# Patient Record
Sex: MALE | Race: WHITE | NOT HISPANIC OR LATINO | Employment: OTHER | URBAN - METROPOLITAN AREA
[De-identification: names, ages, dates, MRNs, and addresses within clinical notes are randomized per-mention and may not be internally consistent; named-entity substitution may affect disease eponyms.]

---

## 2017-07-18 ENCOUNTER — ANESTHESIA EVENT (OUTPATIENT)
Dept: PERIOP | Facility: HOSPITAL | Age: 67
End: 2017-07-18

## 2017-07-18 ENCOUNTER — ANESTHESIA (OUTPATIENT)
Dept: PERIOP | Facility: HOSPITAL | Age: 67
End: 2017-07-18

## 2017-07-18 ENCOUNTER — APPOINTMENT (OUTPATIENT)
Dept: GENERAL RADIOLOGY | Facility: HOSPITAL | Age: 67
End: 2017-07-18

## 2017-07-18 ENCOUNTER — HOSPITAL ENCOUNTER (INPATIENT)
Facility: HOSPITAL | Age: 67
LOS: 11 days | Discharge: HOME HEALTH | End: 2017-07-29
Attending: FAMILY MEDICINE | Admitting: SURGERY

## 2017-07-18 ENCOUNTER — APPOINTMENT (OUTPATIENT)
Dept: CT IMAGING | Facility: HOSPITAL | Age: 67
End: 2017-07-18

## 2017-07-18 DIAGNOSIS — R53.1 GENERALIZED WEAKNESS: ICD-10-CM

## 2017-07-18 DIAGNOSIS — I71.30 RUPTURED ABDOMINAL AORTIC ANEURYSM (AAA) (HCC): Primary | ICD-10-CM

## 2017-07-18 LAB
ABO GROUP BLD: NORMAL
ALBUMIN SERPL-MCNC: 3.2 G/DL (ref 3.5–5.2)
ALBUMIN/GLOB SERPL: 1.1 G/DL
ALP SERPL-CCNC: 81 U/L (ref 39–117)
ALT SERPL W P-5'-P-CCNC: 19 U/L (ref 1–41)
AMPHET+METHAMPHET UR QL: NEGATIVE
ANION GAP SERPL CALCULATED.3IONS-SCNC: 13.6 MMOL/L
ANION GAP SERPL CALCULATED.3IONS-SCNC: 15.4 MMOL/L
ANION GAP SERPL CALCULATED.3IONS-SCNC: 23.6 MMOL/L
APAP SERPL-MCNC: <5 MCG/ML (ref 10–30)
APTT PPP: 30.8 SECONDS (ref 22.7–35.4)
APTT PPP: 35.2 SECONDS (ref 22.7–35.4)
ARTERIAL PATENCY WRIST A: ABNORMAL
ARTERIAL PATENCY WRIST A: POSITIVE
AST SERPL-CCNC: 32 U/L (ref 1–40)
ATMOSPHERIC PRESS: 749.5 MMHG
ATMOSPHERIC PRESS: 754 MMHG
BACTERIA UR QL AUTO: ABNORMAL /HPF
BARBITURATES UR QL SCN: NEGATIVE
BASE EXCESS BLDA CALC-SCNC: -11.4 MMOL/L (ref 0–2)
BASE EXCESS BLDA CALC-SCNC: -4.2 MMOL/L (ref 0–2)
BASOPHILS # BLD AUTO: 0.01 10*3/MM3 (ref 0–0.2)
BASOPHILS # BLD AUTO: 0.01 10*3/MM3 (ref 0–0.2)
BASOPHILS # BLD AUTO: 0.03 10*3/MM3 (ref 0–0.2)
BASOPHILS NFR BLD AUTO: 0 % (ref 0–1.5)
BASOPHILS NFR BLD AUTO: 0 % (ref 0–1.5)
BASOPHILS NFR BLD AUTO: 0.2 % (ref 0–1.5)
BDY SITE: ABNORMAL
BDY SITE: ABNORMAL
BENZODIAZ UR QL SCN: NEGATIVE
BILIRUB SERPL-MCNC: 0.3 MG/DL (ref 0.1–1.2)
BILIRUB UR QL STRIP: NEGATIVE
BLD GP AB SCN SERPL QL: NEGATIVE
BUN BLD-MCNC: 10 MG/DL (ref 8–23)
BUN BLD-MCNC: 12 MG/DL (ref 8–23)
BUN BLD-MCNC: 9 MG/DL (ref 8–23)
BUN/CREAT SERPL: 5.3 (ref 7–25)
BUN/CREAT SERPL: 7.7 (ref 7–25)
BUN/CREAT SERPL: 9.2 (ref 7–25)
CA-I BLD-MCNC: 3.7 MG/DL (ref 4.6–5.4)
CA-I SERPL ISE-MCNC: 0.93 MMOL/L (ref 1.1–1.35)
CALCIUM SPEC-SCNC: 6.5 MG/DL (ref 8.6–10.5)
CALCIUM SPEC-SCNC: 7.1 MG/DL (ref 8.6–10.5)
CALCIUM SPEC-SCNC: 8.6 MG/DL (ref 8.6–10.5)
CANNABINOIDS SERPL QL: NEGATIVE
CHLORIDE SERPL-SCNC: 100 MMOL/L (ref 98–107)
CHLORIDE SERPL-SCNC: 106 MMOL/L (ref 98–107)
CHLORIDE SERPL-SCNC: 109 MMOL/L (ref 98–107)
CLARITY UR: ABNORMAL
CO2 SERPL-SCNC: 17.4 MMOL/L (ref 22–29)
CO2 SERPL-SCNC: 20.6 MMOL/L (ref 22–29)
CO2 SERPL-SCNC: 21.4 MMOL/L (ref 22–29)
COCAINE UR QL: NEGATIVE
COLOR UR: YELLOW
CREAT BLD-MCNC: 1.3 MG/DL (ref 0.76–1.27)
CREAT BLD-MCNC: 1.3 MG/DL (ref 0.76–1.27)
CREAT BLD-MCNC: 1.7 MG/DL (ref 0.76–1.27)
D DIMER PPP FEU-MCNC: >20 MCGFEU/ML (ref 0–0.49)
DEPRECATED RDW RBC AUTO: 46.6 FL (ref 37–54)
DEPRECATED RDW RBC AUTO: 46.7 FL (ref 37–54)
DEPRECATED RDW RBC AUTO: 47.8 FL (ref 37–54)
EOSINOPHIL # BLD AUTO: 0.03 10*3/MM3 (ref 0–0.7)
EOSINOPHIL # BLD AUTO: 0.04 10*3/MM3 (ref 0–0.7)
EOSINOPHIL # BLD AUTO: 0.16 10*3/MM3 (ref 0–0.7)
EOSINOPHIL NFR BLD AUTO: 0.1 % (ref 0.3–6.2)
EOSINOPHIL NFR BLD AUTO: 0.2 % (ref 0.3–6.2)
EOSINOPHIL NFR BLD AUTO: 1.2 % (ref 0.3–6.2)
ERYTHROCYTE [DISTWIDTH] IN BLOOD BY AUTOMATED COUNT: 13.4 % (ref 11.5–14.5)
ERYTHROCYTE [DISTWIDTH] IN BLOOD BY AUTOMATED COUNT: 13.9 % (ref 11.5–14.5)
ERYTHROCYTE [DISTWIDTH] IN BLOOD BY AUTOMATED COUNT: 13.9 % (ref 11.5–14.5)
ETHANOL BLD-MCNC: <10 MG/DL (ref 0–10)
ETHANOL UR QL: <0.01 %
FIBRINOGEN PPP-MCNC: 273 MG/DL (ref 219–464)
FIBRINOGEN PPP-MCNC: 338 MG/DL (ref 219–464)
GFR SERPL CREATININE-BSD FRML MDRD: 40 ML/MIN/1.73
GFR SERPL CREATININE-BSD FRML MDRD: 55 ML/MIN/1.73
GFR SERPL CREATININE-BSD FRML MDRD: 55 ML/MIN/1.73
GLOBULIN UR ELPH-MCNC: 2.8 GM/DL
GLUCOSE BLD-MCNC: 134 MG/DL (ref 65–99)
GLUCOSE BLD-MCNC: 150 MG/DL (ref 65–99)
GLUCOSE BLD-MCNC: 358 MG/DL (ref 65–99)
GLUCOSE BLDC GLUCOMTR-MCNC: 138 MG/DL (ref 70–130)
GLUCOSE UR STRIP-MCNC: ABNORMAL MG/DL
HCO3 BLDA-SCNC: 16.2 MMOL/L (ref 22–28)
HCO3 BLDA-SCNC: 21.9 MMOL/L (ref 22–28)
HCT VFR BLD AUTO: 40.2 % (ref 40.4–52.2)
HCT VFR BLD AUTO: 43.3 % (ref 40.4–52.2)
HCT VFR BLD AUTO: 43.5 % (ref 40.4–52.2)
HGB BLD-MCNC: 12.7 G/DL (ref 13.7–17.6)
HGB BLD-MCNC: 14.5 G/DL (ref 13.7–17.6)
HGB BLD-MCNC: 14.5 G/DL (ref 13.7–17.6)
HGB UR QL STRIP.AUTO: ABNORMAL
HOROWITZ INDEX BLD+IHG-RTO: 100 %
HOROWITZ INDEX BLD+IHG-RTO: 100 %
HYALINE CASTS UR QL AUTO: ABNORMAL /LPF
IMM GRANULOCYTES # BLD: 0.11 10*3/MM3 (ref 0–0.03)
IMM GRANULOCYTES # BLD: 0.14 10*3/MM3 (ref 0–0.03)
IMM GRANULOCYTES # BLD: 0.51 10*3/MM3 (ref 0–0.03)
IMM GRANULOCYTES NFR BLD: 0.5 % (ref 0–0.5)
IMM GRANULOCYTES NFR BLD: 0.7 % (ref 0–0.5)
IMM GRANULOCYTES NFR BLD: 3.8 % (ref 0–0.5)
INR PPP: 1.29 (ref 0.9–1.1)
INR PPP: 1.38 (ref 0.9–1.1)
INR PPP: 1.4 (ref 0.9–1.1)
KETONES UR QL STRIP: NEGATIVE
LEUKOCYTE ESTERASE UR QL STRIP.AUTO: ABNORMAL
LYMPHOCYTES # BLD AUTO: 1.66 10*3/MM3 (ref 0.9–4.8)
LYMPHOCYTES # BLD AUTO: 1.72 10*3/MM3 (ref 0.9–4.8)
LYMPHOCYTES # BLD AUTO: 1.83 10*3/MM3 (ref 0.9–4.8)
LYMPHOCYTES NFR BLD AUTO: 13.5 % (ref 19.6–45.3)
LYMPHOCYTES NFR BLD AUTO: 7.7 % (ref 19.6–45.3)
LYMPHOCYTES NFR BLD AUTO: 8 % (ref 19.6–45.3)
MCH RBC QN AUTO: 30.6 PG (ref 27–32.7)
MCH RBC QN AUTO: 30.7 PG (ref 27–32.7)
MCH RBC QN AUTO: 30.9 PG (ref 27–32.7)
MCHC RBC AUTO-ENTMCNC: 31.6 G/DL (ref 32.6–36.4)
MCHC RBC AUTO-ENTMCNC: 33.3 G/DL (ref 32.6–36.4)
MCHC RBC AUTO-ENTMCNC: 33.5 G/DL (ref 32.6–36.4)
MCV RBC AUTO: 92.1 FL (ref 79.8–96.2)
MCV RBC AUTO: 92.2 FL (ref 79.8–96.2)
MCV RBC AUTO: 96.9 FL (ref 79.8–96.2)
METHADONE UR QL SCN: NEGATIVE
MODALITY: ABNORMAL
MODALITY: ABNORMAL
MONOCYTES # BLD AUTO: 0.28 10*3/MM3 (ref 0.2–1.2)
MONOCYTES # BLD AUTO: 0.3 10*3/MM3 (ref 0.2–1.2)
MONOCYTES # BLD AUTO: 0.64 10*3/MM3 (ref 0.2–1.2)
MONOCYTES NFR BLD AUTO: 1.3 % (ref 5–12)
MONOCYTES NFR BLD AUTO: 1.4 % (ref 5–12)
MONOCYTES NFR BLD AUTO: 4.7 % (ref 5–12)
NEUTROPHILS # BLD AUTO: 10.41 10*3/MM3 (ref 1.9–8.1)
NEUTROPHILS # BLD AUTO: 19.39 10*3/MM3 (ref 1.9–8.1)
NEUTROPHILS # BLD AUTO: 19.45 10*3/MM3 (ref 1.9–8.1)
NEUTROPHILS NFR BLD AUTO: 76.6 % (ref 42.7–76)
NEUTROPHILS NFR BLD AUTO: 90 % (ref 42.7–76)
NEUTROPHILS NFR BLD AUTO: 90.1 % (ref 42.7–76)
NITRITE UR QL STRIP: NEGATIVE
NT-PROBNP SERPL-MCNC: 6255 PG/ML (ref 0–900)
O2 A-A PPRESDIFF RESPIRATORY: 0.7 MMHG
O2 A-A PPRESDIFF RESPIRATORY: 0.8 MMHG
OPIATES UR QL: NEGATIVE
OXYCODONE UR QL SCN: NEGATIVE
PCO2 BLDA: 41.6 MM HG (ref 35–45)
PCO2 BLDA: 42.6 MM HG (ref 35–45)
PEEP RESPIRATORY: 5 CM[H2O]
PEEP RESPIRATORY: 7.5 CM[H2O]
PH BLDA: 7.2 PH UNITS (ref 7.35–7.45)
PH BLDA: 7.32 PH UNITS (ref 7.35–7.45)
PH UR STRIP.AUTO: 7.5 [PH] (ref 5–8)
PLATELET # BLD AUTO: 190 10*3/MM3 (ref 140–500)
PLATELET # BLD AUTO: 76 10*3/MM3 (ref 140–500)
PLATELET # BLD AUTO: 82 10*3/MM3 (ref 140–500)
PMV BLD AUTO: 10.3 FL (ref 6–12)
PMV BLD AUTO: 10.5 FL (ref 6–12)
PMV BLD AUTO: 11.2 FL (ref 6–12)
PO2 BLDA: 506.6 MM HG (ref 80–100)
PO2 BLDA: 523 MM HG (ref 80–100)
POTASSIUM BLD-SCNC: 4 MMOL/L (ref 3.5–5.2)
POTASSIUM BLD-SCNC: 4.1 MMOL/L (ref 3.5–5.2)
POTASSIUM BLD-SCNC: 4.9 MMOL/L (ref 3.5–5.2)
PROT SERPL-MCNC: 6 G/DL (ref 6–8.5)
PROT UR QL STRIP: ABNORMAL
PROTHROMBIN TIME: 15.6 SECONDS (ref 11.7–14.2)
PROTHROMBIN TIME: 16.4 SECONDS (ref 11.7–14.2)
PROTHROMBIN TIME: 16.7 SECONDS (ref 11.7–14.2)
RBC # BLD AUTO: 4.15 10*6/MM3 (ref 4.6–6)
RBC # BLD AUTO: 4.7 10*6/MM3 (ref 4.6–6)
RBC # BLD AUTO: 4.72 10*6/MM3 (ref 4.6–6)
RBC # UR: ABNORMAL /HPF
REF LAB TEST METHOD: ABNORMAL
RH BLD: POSITIVE
SALICYLATES SERPL-MCNC: <0.3 MG/DL
SAO2 % BLDCOA: 100 % (ref 92–99)
SAO2 % BLDCOA: 100 % (ref 92–99)
SET MECH RESP RATE: 20
SET MECH RESP RATE: 20
SODIUM BLD-SCNC: 141 MMOL/L (ref 136–145)
SODIUM BLD-SCNC: 142 MMOL/L (ref 136–145)
SODIUM BLD-SCNC: 144 MMOL/L (ref 136–145)
SP GR UR STRIP: 1.02 (ref 1–1.03)
SQUAMOUS #/AREA URNS HPF: ABNORMAL /HPF
THROMBIN TIME: 16.6 SECONDS (ref 15.7–20.4)
TOTAL RATE: 20 BREATHS/MINUTE
TOTAL RATE: 33 BREATHS/MINUTE
TROPONIN T SERPL-MCNC: 0.08 NG/ML (ref 0–0.03)
UROBILINOGEN UR QL STRIP: ABNORMAL
VENTILATOR MODE: ABNORMAL
VENTILATOR MODE: ABNORMAL
VT ON VENT VENT: 550 ML
VT ON VENT VENT: 550 ML
WBC NRBC COR # BLD: 13.58 10*3/MM3 (ref 4.5–10.7)
WBC NRBC COR # BLD: 21.52 10*3/MM3 (ref 4.5–10.7)
WBC NRBC COR # BLD: 21.62 10*3/MM3 (ref 4.5–10.7)
WBC UR QL AUTO: ABNORMAL /HPF

## 2017-07-18 PROCEDURE — 86901 BLOOD TYPING SEROLOGIC RH(D): CPT | Performed by: FAMILY MEDICINE

## 2017-07-18 PROCEDURE — 93005 ELECTROCARDIOGRAM TRACING: CPT | Performed by: SURGERY

## 2017-07-18 PROCEDURE — C1894 INTRO/SHEATH, NON-LASER: HCPCS | Performed by: SURGERY

## 2017-07-18 PROCEDURE — 94799 UNLISTED PULMONARY SVC/PX: CPT

## 2017-07-18 PROCEDURE — 85014 HEMATOCRIT: CPT

## 2017-07-18 PROCEDURE — 25010000003 CEFAZOLIN PER 500 MG: Performed by: NURSE ANESTHETIST, CERTIFIED REGISTERED

## 2017-07-18 PROCEDURE — 84484 ASSAY OF TROPONIN QUANT: CPT | Performed by: FAMILY MEDICINE

## 2017-07-18 PROCEDURE — 87086 URINE CULTURE/COLONY COUNT: CPT | Performed by: FAMILY MEDICINE

## 2017-07-18 PROCEDURE — 85025 COMPLETE CBC W/AUTO DIFF WBC: CPT | Performed by: FAMILY MEDICINE

## 2017-07-18 PROCEDURE — 047C3ZZ DILATION OF RIGHT COMMON ILIAC ARTERY, PERCUTANEOUS APPROACH: ICD-10-PCS | Performed by: SURGERY

## 2017-07-18 PROCEDURE — 80307 DRUG TEST PRSMV CHEM ANLYZR: CPT | Performed by: FAMILY MEDICINE

## 2017-07-18 PROCEDURE — 85025 COMPLETE CBC W/AUTO DIFF WBC: CPT | Performed by: SURGERY

## 2017-07-18 PROCEDURE — 99291 CRITICAL CARE FIRST HOUR: CPT

## 2017-07-18 PROCEDURE — P9016 RBC LEUKOCYTES REDUCED: HCPCS

## 2017-07-18 PROCEDURE — 85384 FIBRINOGEN ACTIVITY: CPT | Performed by: SURGERY

## 2017-07-18 PROCEDURE — C1769 GUIDE WIRE: HCPCS | Performed by: SURGERY

## 2017-07-18 PROCEDURE — 85610 PROTHROMBIN TIME: CPT | Performed by: SURGERY

## 2017-07-18 PROCEDURE — 85384 FIBRINOGEN ACTIVITY: CPT | Performed by: FAMILY MEDICINE

## 2017-07-18 PROCEDURE — 85670 THROMBIN TIME PLASMA: CPT | Performed by: FAMILY MEDICINE

## 2017-07-18 PROCEDURE — 85730 THROMBOPLASTIN TIME PARTIAL: CPT | Performed by: SURGERY

## 2017-07-18 PROCEDURE — 80053 COMPREHEN METABOLIC PANEL: CPT | Performed by: FAMILY MEDICINE

## 2017-07-18 PROCEDURE — 25010000002 MIDAZOLAM PER 1 MG: Performed by: NURSE ANESTHETIST, CERTIFIED REGISTERED

## 2017-07-18 PROCEDURE — 25010000003 CEFAZOLIN PER 500 MG: Performed by: SURGERY

## 2017-07-18 PROCEDURE — 93010 ELECTROCARDIOGRAM REPORT: CPT | Performed by: INTERNAL MEDICINE

## 2017-07-18 PROCEDURE — 25010000002 PROTAMINE SULFATE PER 10 MG: Performed by: NURSE ANESTHETIST, CERTIFIED REGISTERED

## 2017-07-18 PROCEDURE — 86900 BLOOD TYPING SEROLOGIC ABO: CPT

## 2017-07-18 PROCEDURE — C1751 CATH, INF, PER/CENT/MIDLINE: HCPCS | Performed by: ANESTHESIOLOGY

## 2017-07-18 PROCEDURE — C1725 CATH, TRANSLUMIN NON-LASER: HCPCS | Performed by: SURGERY

## 2017-07-18 PROCEDURE — 82962 GLUCOSE BLOOD TEST: CPT

## 2017-07-18 PROCEDURE — 94002 VENT MGMT INPAT INIT DAY: CPT

## 2017-07-18 PROCEDURE — 86901 BLOOD TYPING SEROLOGIC RH(D): CPT

## 2017-07-18 PROCEDURE — 81001 URINALYSIS AUTO W/SCOPE: CPT | Performed by: FAMILY MEDICINE

## 2017-07-18 PROCEDURE — 51702 INSERT TEMP BLADDER CATH: CPT

## 2017-07-18 PROCEDURE — 25010000002 HEPARIN (PORCINE) PER 1000 UNITS: Performed by: NURSE ANESTHETIST, CERTIFIED REGISTERED

## 2017-07-18 PROCEDURE — 36600 WITHDRAWAL OF ARTERIAL BLOOD: CPT

## 2017-07-18 PROCEDURE — 71010 HC CHEST PA OR AP: CPT

## 2017-07-18 PROCEDURE — 93005 ELECTROCARDIOGRAM TRACING: CPT | Performed by: FAMILY MEDICINE

## 2017-07-18 PROCEDURE — 83880 ASSAY OF NATRIURETIC PEPTIDE: CPT | Performed by: FAMILY MEDICINE

## 2017-07-18 PROCEDURE — 86920 COMPATIBILITY TEST SPIN: CPT

## 2017-07-18 PROCEDURE — 82330 ASSAY OF CALCIUM: CPT | Performed by: SURGERY

## 2017-07-18 PROCEDURE — 82803 BLOOD GASES ANY COMBINATION: CPT

## 2017-07-18 PROCEDURE — 25010000002 PROPOFOL 1000 MG/ML EMULSION: Performed by: SURGERY

## 2017-07-18 PROCEDURE — C1751 CATH, INF, PER/CENT/MIDLINE: HCPCS | Performed by: SURGERY

## 2017-07-18 PROCEDURE — 85610 PROTHROMBIN TIME: CPT | Performed by: FAMILY MEDICINE

## 2017-07-18 PROCEDURE — 047D3ZZ DILATION OF LEFT COMMON ILIAC ARTERY, PERCUTANEOUS APPROACH: ICD-10-PCS | Performed by: SURGERY

## 2017-07-18 PROCEDURE — 85730 THROMBOPLASTIN TIME PARTIAL: CPT | Performed by: FAMILY MEDICINE

## 2017-07-18 PROCEDURE — 86850 RBC ANTIBODY SCREEN: CPT | Performed by: FAMILY MEDICINE

## 2017-07-18 PROCEDURE — 85379 FIBRIN DEGRADATION QUANT: CPT | Performed by: SURGERY

## 2017-07-18 PROCEDURE — P9035 PLATELET PHERES LEUKOREDUCED: HCPCS

## 2017-07-18 PROCEDURE — 82947 ASSAY GLUCOSE BLOOD QUANT: CPT

## 2017-07-18 PROCEDURE — 04V03DZ RESTRICTION OF ABDOMINAL AORTA WITH INTRALUMINAL DEVICE, PERCUTANEOUS APPROACH: ICD-10-PCS | Performed by: SURGERY

## 2017-07-18 PROCEDURE — 86900 BLOOD TYPING SEROLOGIC ABO: CPT | Performed by: FAMILY MEDICINE

## 2017-07-18 PROCEDURE — 25010000002 PROPOFOL 10 MG/ML EMULSION

## 2017-07-18 PROCEDURE — 0 IOPAMIDOL PER 1 ML: Performed by: SURGERY

## 2017-07-18 PROCEDURE — 70450 CT HEAD/BRAIN W/O DYE: CPT

## 2017-07-18 PROCEDURE — 85347 COAGULATION TIME ACTIVATED: CPT

## 2017-07-18 PROCEDURE — 0T9B30Z DRAINAGE OF BLADDER WITH DRAINAGE DEVICE, PERCUTANEOUS APPROACH: ICD-10-PCS | Performed by: FAMILY MEDICINE

## 2017-07-18 PROCEDURE — 25010000002 HEPARIN (PORCINE) PER 1000 UNITS: Performed by: SURGERY

## 2017-07-18 PROCEDURE — 72125 CT NECK SPINE W/O DYE: CPT

## 2017-07-18 PROCEDURE — 74176 CT ABD & PELVIS W/O CONTRAST: CPT

## 2017-07-18 PROCEDURE — 86927 PLASMA FRESH FROZEN: CPT

## 2017-07-18 PROCEDURE — 25010000002 FENTANYL CITRATE (PF) 100 MCG/2ML SOLUTION: Performed by: NURSE ANESTHETIST, CERTIFIED REGISTERED

## 2017-07-18 PROCEDURE — C1773 RET DEV, INSERTABLE: HCPCS | Performed by: SURGERY

## 2017-07-18 PROCEDURE — 86923 COMPATIBILITY TEST ELECTRIC: CPT

## 2017-07-18 PROCEDURE — 85018 HEMOGLOBIN: CPT

## 2017-07-18 PROCEDURE — P9017 PLASMA 1 DONOR FRZ W/IN 8 HR: HCPCS

## 2017-07-18 PROCEDURE — 36430 TRANSFUSION BLD/BLD COMPNT: CPT

## 2017-07-18 DEVICE — GRFT EXCLDR CONTRALAT 12MMX14CM: Type: IMPLANTABLE DEVICE | Status: FUNCTIONAL

## 2017-07-18 DEVICE — GRFT EXCLDR C3 TRNK I/LAT 28X14.5MM 14CM: Type: IMPLANTABLE DEVICE | Status: FUNCTIONAL

## 2017-07-18 RX ORDER — HYDRALAZINE HYDROCHLORIDE 20 MG/ML
5 INJECTION INTRAMUSCULAR; INTRAVENOUS
Status: DISCONTINUED | OUTPATIENT
Start: 2017-07-18 | End: 2017-07-18 | Stop reason: HOSPADM

## 2017-07-18 RX ORDER — GLYCOPYRROLATE 0.2 MG/ML
INJECTION INTRAMUSCULAR; INTRAVENOUS AS NEEDED
Status: DISCONTINUED | OUTPATIENT
Start: 2017-07-18 | End: 2017-07-18 | Stop reason: SURG

## 2017-07-18 RX ORDER — ROCURONIUM BROMIDE 10 MG/ML
INJECTION, SOLUTION INTRAVENOUS AS NEEDED
Status: DISCONTINUED | OUTPATIENT
Start: 2017-07-18 | End: 2017-07-18

## 2017-07-18 RX ORDER — EPHEDRINE SULFATE 50 MG/ML
INJECTION, SOLUTION INTRAVENOUS AS NEEDED
Status: DISCONTINUED | OUTPATIENT
Start: 2017-07-18 | End: 2017-07-18 | Stop reason: SURG

## 2017-07-18 RX ORDER — NALOXONE HCL 0.4 MG/ML
0.4 VIAL (ML) INJECTION
Status: DISCONTINUED | OUTPATIENT
Start: 2017-07-18 | End: 2017-07-29 | Stop reason: HOSPADM

## 2017-07-18 RX ORDER — FENTANYL CITRATE 50 UG/ML
50 INJECTION, SOLUTION INTRAMUSCULAR; INTRAVENOUS
Status: DISCONTINUED | OUTPATIENT
Start: 2017-07-18 | End: 2017-07-18 | Stop reason: HOSPADM

## 2017-07-18 RX ORDER — HYDROMORPHONE HYDROCHLORIDE 1 MG/ML
0.5 INJECTION, SOLUTION INTRAMUSCULAR; INTRAVENOUS; SUBCUTANEOUS
Status: DISCONTINUED | OUTPATIENT
Start: 2017-07-18 | End: 2017-07-18 | Stop reason: HOSPADM

## 2017-07-18 RX ORDER — ROCURONIUM BROMIDE 10 MG/ML
INJECTION, SOLUTION INTRAVENOUS AS NEEDED
Status: DISCONTINUED | OUTPATIENT
Start: 2017-07-18 | End: 2017-07-18 | Stop reason: SURG

## 2017-07-18 RX ORDER — SODIUM CHLORIDE 9 MG/ML
50 INJECTION, SOLUTION INTRAVENOUS CONTINUOUS
Status: DISCONTINUED | OUTPATIENT
Start: 2017-07-18 | End: 2017-07-21

## 2017-07-18 RX ORDER — HYDROMORPHONE HYDROCHLORIDE 1 MG/ML
0.5 INJECTION, SOLUTION INTRAMUSCULAR; INTRAVENOUS; SUBCUTANEOUS
Status: DISPENSED | OUTPATIENT
Start: 2017-07-18 | End: 2017-07-28

## 2017-07-18 RX ORDER — CEFAZOLIN SODIUM 2 G/100ML
2 INJECTION, SOLUTION INTRAVENOUS EVERY 8 HOURS
Status: COMPLETED | OUTPATIENT
Start: 2017-07-18 | End: 2017-07-19

## 2017-07-18 RX ORDER — SODIUM CHLORIDE 9 MG/ML
INJECTION, SOLUTION INTRAVENOUS CONTINUOUS PRN
Status: DISCONTINUED | OUTPATIENT
Start: 2017-07-18 | End: 2017-07-18 | Stop reason: SURG

## 2017-07-18 RX ORDER — MIDAZOLAM HYDROCHLORIDE 1 MG/ML
INJECTION INTRAMUSCULAR; INTRAVENOUS AS NEEDED
Status: DISCONTINUED | OUTPATIENT
Start: 2017-07-18 | End: 2017-07-18 | Stop reason: SURG

## 2017-07-18 RX ORDER — PANTOPRAZOLE SODIUM 40 MG/10ML
40 INJECTION, POWDER, LYOPHILIZED, FOR SOLUTION INTRAVENOUS
Status: DISCONTINUED | OUTPATIENT
Start: 2017-07-19 | End: 2017-07-19 | Stop reason: CLARIF

## 2017-07-18 RX ORDER — SODIUM CHLORIDE, SODIUM LACTATE, POTASSIUM CHLORIDE, CALCIUM CHLORIDE 600; 310; 30; 20 MG/100ML; MG/100ML; MG/100ML; MG/100ML
INJECTION, SOLUTION INTRAVENOUS CONTINUOUS PRN
Status: DISCONTINUED | OUTPATIENT
Start: 2017-07-18 | End: 2017-07-18 | Stop reason: SURG

## 2017-07-18 RX ORDER — ONDANSETRON 2 MG/ML
4 INJECTION INTRAMUSCULAR; INTRAVENOUS ONCE AS NEEDED
Status: DISCONTINUED | OUTPATIENT
Start: 2017-07-18 | End: 2017-07-18 | Stop reason: HOSPADM

## 2017-07-18 RX ORDER — MAGNESIUM HYDROXIDE 1200 MG/15ML
LIQUID ORAL AS NEEDED
Status: DISCONTINUED | OUTPATIENT
Start: 2017-07-18 | End: 2017-07-18 | Stop reason: HOSPADM

## 2017-07-18 RX ORDER — PROTAMINE SULFATE 10 MG/ML
INJECTION, SOLUTION INTRAVENOUS AS NEEDED
Status: DISCONTINUED | OUTPATIENT
Start: 2017-07-18 | End: 2017-07-18 | Stop reason: SURG

## 2017-07-18 RX ORDER — MIDAZOLAM HYDROCHLORIDE 1 MG/ML
1 INJECTION INTRAMUSCULAR; INTRAVENOUS
Status: DISCONTINUED | OUTPATIENT
Start: 2017-07-18 | End: 2017-07-18 | Stop reason: HOSPADM

## 2017-07-18 RX ORDER — PROPOFOL 10 MG/ML
VIAL (ML) INTRAVENOUS
Status: COMPLETED
Start: 2017-07-18 | End: 2017-07-18

## 2017-07-18 RX ORDER — ALBUTEROL SULFATE 2.5 MG/3ML
2.5 SOLUTION RESPIRATORY (INHALATION) ONCE AS NEEDED
Status: DISCONTINUED | OUTPATIENT
Start: 2017-07-18 | End: 2017-07-18 | Stop reason: HOSPADM

## 2017-07-18 RX ORDER — FENTANYL CITRATE 50 UG/ML
INJECTION, SOLUTION INTRAMUSCULAR; INTRAVENOUS AS NEEDED
Status: DISCONTINUED | OUTPATIENT
Start: 2017-07-18 | End: 2017-07-18 | Stop reason: SURG

## 2017-07-18 RX ORDER — HEPARIN SODIUM 1000 [USP'U]/ML
INJECTION, SOLUTION INTRAVENOUS; SUBCUTANEOUS AS NEEDED
Status: DISCONTINUED | OUTPATIENT
Start: 2017-07-18 | End: 2017-07-18 | Stop reason: SURG

## 2017-07-18 RX ORDER — LABETALOL HYDROCHLORIDE 5 MG/ML
5 INJECTION, SOLUTION INTRAVENOUS
Status: DISCONTINUED | OUTPATIENT
Start: 2017-07-18 | End: 2017-07-18 | Stop reason: HOSPADM

## 2017-07-18 RX ORDER — NICOTINE 21 MG/24HR
1 PATCH, TRANSDERMAL 24 HOURS TRANSDERMAL EVERY 24 HOURS
Status: DISCONTINUED | OUTPATIENT
Start: 2017-07-18 | End: 2017-07-29

## 2017-07-18 RX ADMIN — HEPARIN SODIUM 7500 UNITS: 1000 INJECTION, SOLUTION INTRAVENOUS; SUBCUTANEOUS at 13:59

## 2017-07-18 RX ADMIN — EPHEDRINE SULFATE 10 MG: 50 INJECTION INTRAMUSCULAR; INTRAVENOUS; SUBCUTANEOUS at 13:43

## 2017-07-18 RX ADMIN — Medication 0.02 MCG/KG/MIN: at 12:00

## 2017-07-18 RX ADMIN — SODIUM CHLORIDE: 9 INJECTION, SOLUTION INTRAVENOUS at 13:25

## 2017-07-18 RX ADMIN — SODIUM CHLORIDE, POTASSIUM CHLORIDE, SODIUM LACTATE AND CALCIUM CHLORIDE: 600; 310; 30; 20 INJECTION, SOLUTION INTRAVENOUS at 13:29

## 2017-07-18 RX ADMIN — MIDAZOLAM HYDROCHLORIDE 2 MG: 1 INJECTION, SOLUTION INTRAMUSCULAR; INTRAVENOUS at 13:30

## 2017-07-18 RX ADMIN — CEFAZOLIN SODIUM 2 G: 1 INJECTION, SOLUTION INTRAVENOUS at 15:36

## 2017-07-18 RX ADMIN — SODIUM CHLORIDE: 9 INJECTION, SOLUTION INTRAVENOUS at 14:53

## 2017-07-18 RX ADMIN — SODIUM CHLORIDE 1000 ML: 9 INJECTION, SOLUTION INTRAVENOUS at 11:55

## 2017-07-18 RX ADMIN — EPHEDRINE SULFATE 10 MG: 50 INJECTION INTRAMUSCULAR; INTRAVENOUS; SUBCUTANEOUS at 13:45

## 2017-07-18 RX ADMIN — PROPOFOL 35 MCG/KG/MIN: 10 INJECTION, EMULSION INTRAVENOUS at 17:00

## 2017-07-18 RX ADMIN — ROCURONIUM BROMIDE 50 MG: 10 INJECTION INTRAVENOUS at 13:37

## 2017-07-18 RX ADMIN — PROPOFOL 30 MCG/KG/MIN: 10 INJECTION, EMULSION INTRAVENOUS at 20:55

## 2017-07-18 RX ADMIN — IOPAMIDOL 152.9 ML: 510 INJECTION, SOLUTION INTRAVASCULAR at 14:00

## 2017-07-18 RX ADMIN — FENTANYL CITRATE 150 MCG: 50 INJECTION, SOLUTION INTRAMUSCULAR; INTRAVENOUS at 13:30

## 2017-07-18 RX ADMIN — Medication 50 MEQ: at 11:50

## 2017-07-18 RX ADMIN — SODIUM BICARBONATE 50 MEQ: 84 INJECTION, SOLUTION INTRAVENOUS at 11:50

## 2017-07-18 RX ADMIN — PROPOFOL 5 MCG/KG/MIN: 10 INJECTION, EMULSION INTRAVENOUS at 12:05

## 2017-07-18 RX ADMIN — SODIUM CHLORIDE 100 ML/HR: 9 INJECTION, SOLUTION INTRAVENOUS at 20:55

## 2017-07-18 RX ADMIN — CLEVIDIPINE 4 MG/HR: 0.5 EMULSION INTRAVENOUS at 17:05

## 2017-07-18 RX ADMIN — PROTAMINE SULFATE 40 MG: 10 INJECTION, SOLUTION INTRAVENOUS at 15:32

## 2017-07-18 RX ADMIN — FENTANYL CITRATE 100 MCG: 50 INJECTION, SOLUTION INTRAMUSCULAR; INTRAVENOUS at 14:23

## 2017-07-18 RX ADMIN — GLYCOPYRROLATE 0.2 MG: 0.2 INJECTION INTRAMUSCULAR; INTRAVENOUS at 13:43

## 2017-07-18 RX ADMIN — SODIUM CHLORIDE: 9 INJECTION, SOLUTION INTRAVENOUS at 13:28

## 2017-07-18 NOTE — ANESTHESIA PROCEDURE NOTES
Central Line    Patient location during procedure: OR  Indications: vascular access  Staff  Anesthesiologist: TERESA GOLD  Preanesthetic Checklist  Completed: patient identified, site marked, surgical consent, pre-op evaluation, timeout performed, IV checked, risks and benefits discussed and monitors and equipment checked  Central Line Prep  Sterile Tech:gloves, cap, gown, mask and sterile barriers  Prep: chloraprep  Patient monitoring: blood pressure monitoring, continuous pulse oximetry and EKG  Central Line Procedure  Laterality:right  Location:internal jugular  Catheter Type:Cordis  Catheter Size:9 Fr  Guidance:ultrasound guided  Assessment  Post procedure:biopatch applied, line sutured and occlusive dressing applied  Assessement:blood return through all ports, free fluid flow and chest x-ray ordered  Complications:no  Patient Tolerance:patient tolerated the procedure well with no apparent complications

## 2017-07-18 NOTE — ANESTHESIA POSTPROCEDURE EVALUATION
Patient: Narciso Constantino    Procedure Summary     Date Anesthesia Start Anesthesia Stop Room / Location    07/18/17 1328 9782  [facility] 18 INV /  [facility] OR 18/19       Procedure Diagnosis Provider Provider    BILATERAL FEMORAL CUT DOWN, AORTIC ILIOFEMORAL ARTERIOGRAM, BILATERAL ARTERY ANGIOPLASTY, GORE STENT GRAFT REPAIR OF RUPTURED AORTIC ANEURYSM (N/A Abdomen) Aneurysm, aorta, abdominal, ruptured MD Guevara Manning MD          Anesthesia Type: general  Last vitals  BP   141/78 (07/18/17 1700)    Temp        Pulse   61 (07/18/17 1700)   Resp   20 (07/18/17 1700)    SpO2   100 % (07/18/17 1700)      Post Anesthesia Care and Evaluation    Patient location during evaluation: bedside  Patient participation: complete - patient cannot participate  Post-procedure mental status: sedated.  Pain management: adequate  Airway patency: patent  Anesthetic complications: No anesthetic complications    Cardiovascular status: acceptable  Respiratory status: ETT, intubated and ventilator  Hydration status: acceptable

## 2017-07-18 NOTE — ANESTHESIA PROCEDURE NOTES
Airway  Airway not difficult    General Information and Staff    Patient location during procedure: OR  Anesthesiologist: ORTEGA ECHEVERRIA    Indications and Patient Condition    Preoxygenated: yes  Mask difficulty assessment: 0 - not attempted    Final Airway Details  Final airway type: endotracheal airway      Successful airway: ETT  Cuffed: yes   Successful intubation technique: direct laryngoscopy  Endotracheal tube insertion site: oral  Blade: Arnold  Blade size: #2  ETT size: 7.5 mm  Cormack-Lehane Classification: grade IIa - partial view of glottis  Placement verified by: chest auscultation and capnometry   Measured from: gums  ETT to gums (cm): 22    Additional Comments  Per surgeon request, endotracheal tube changed from nasal to oral.

## 2017-07-18 NOTE — ANESTHESIA PREPROCEDURE EVALUATION
Anesthesia Evaluation     NPO Solid Status: Waived due to emergency  NPO Liquid Status: Waived due to emergency     Airway   Dental      Pulmonary    (+) pneumonia , a smoker Current, COPD,     ROS comment: Aspiration pneumonia on CT  Cardiovascular     (+) hypertension,       Neuro/Psych  GI/Hepatic/Renal/Endo    (+)  renal disease CRI, diabetes mellitus,     Musculoskeletal     Abdominal    Substance History      OB/GYN          Other            Phys Exam Other: Nasally intubated from field                            Anesthesia Plan    ASA 4 - emergent     general   (Arterial and Central line)  Plan/risks discussed with: Pt intubated and non-responsive.

## 2017-07-18 NOTE — ANESTHESIA PROCEDURE NOTES
Arterial Line    Patient location during procedure: post-op  Start time: 7/18/2017 4:56 PM  Stop Time:7/18/2017 5:06 PM       Line placed for hemodynamic monitoring.  Performed By   Anesthesiologist: ORTEGA ECHEVERRIA  Preanesthetic Checklist  Completed: patient identified and risks and benefits discussed  Arterial Line Prep   Sterile Tech: gloves  Prep: ChloraPrep  Patient monitoring: blood pressure monitoring, continuous pulse oximetry and EKG  Arterial Line Procedure   Laterality:left  Location:  radial artery  Catheter size: 20 G   Guidance: ultrasound guided  PROCEDURE NOTE/ULTRASOUND INTERPRETATION.  Using ultrasound guidance the potential vascular sites for insertion of the catheter were visualized to determine the patency of the vessel to be used for vascular access.  After selecting the appropriate site for insertion, the needle was visualized under ultrasound being inserted into the radial artery, followed by ultrasound confirmation of wire and catheter placement. There were no abnormalities seen on ultrasound; an image was taken; and the patient tolerated the procedure with no complications.   Number of attempts: 2  Successful placement: yes          Post Assessment   Dressing Type: occlusive dressing applied and secured with tape.   Complications no  Patient Tolerance: patient tolerated the procedure well with no apparent complications  Additional Notes  Using ultrasound guidance the potential vascular sites for insertion of the catheter were visualized to determine the patency of the vessel to be used for vascular access.  After selecting the appropriate site for insertion, the needle was visualized under ultrasound being inserted into the radial artery, followed by ultrasound confirmation of wire and catheter placement.  There were no abnormalities seen on ultrasound; an image was taken/ and the patient tolerated the procedure with no complications.

## 2017-07-19 ENCOUNTER — APPOINTMENT (OUTPATIENT)
Dept: CT IMAGING | Facility: HOSPITAL | Age: 67
End: 2017-07-19

## 2017-07-19 ENCOUNTER — APPOINTMENT (OUTPATIENT)
Dept: CARDIOLOGY | Facility: HOSPITAL | Age: 67
End: 2017-07-19
Attending: INTERNAL MEDICINE

## 2017-07-19 ENCOUNTER — APPOINTMENT (OUTPATIENT)
Dept: GENERAL RADIOLOGY | Facility: HOSPITAL | Age: 67
End: 2017-07-19
Attending: INTERNAL MEDICINE

## 2017-07-19 LAB
ABO + RH BLD: NORMAL
ACT BLD: 268 SECONDS (ref 82–152)
ANION GAP SERPL CALCULATED.3IONS-SCNC: 14.9 MMOL/L
AORTIC ARCH: 2.6 CM
APTT PPP: 30.6 SECONDS (ref 22.7–35.4)
ARTERIAL PATENCY WRIST A: ABNORMAL
ASCENDING AORTA: 3 CM
ATMOSPHERIC PRESS: 750.7 MMHG
BASE EXCESS BLDA CALC-SCNC: -13 MMOL/L (ref -5–5)
BASE EXCESS BLDA CALC-SCNC: -13 MMOL/L (ref -5–5)
BASE EXCESS BLDA CALC-SCNC: -2.4 MMOL/L (ref 0–2)
BASOPHILS # BLD AUTO: 0.01 10*3/MM3 (ref 0–0.2)
BASOPHILS NFR BLD AUTO: 0.1 % (ref 0–1.5)
BDY SITE: ABNORMAL
BH BB BLOOD EXPIRATION DATE: NORMAL
BH BB BLOOD TYPE BARCODE: 5100
BH BB BLOOD TYPE BARCODE: 6200
BH BB BLOOD TYPE BARCODE: 6200
BH BB BLOOD TYPE BARCODE: 7300
BH BB DISPENSE STATUS: NORMAL
BH BB PRODUCT CODE: NORMAL
BH BB UNIT NUMBER: NORMAL
BH CV ECHO MEAS - ACS: 1 CM
BH CV ECHO MEAS - AI DEC SLOPE: 271 CM/SEC^2
BH CV ECHO MEAS - AI MAX PG: 54.9 MMHG
BH CV ECHO MEAS - AI MAX VEL: 370 CM/SEC
BH CV ECHO MEAS - AI P1/2T: 399.9 MSEC
BH CV ECHO MEAS - AO MAX PG: 26 MMHG
BH CV ECHO MEAS - AO MEAN PG (FULL): 7 MMHG
BH CV ECHO MEAS - AO MEAN PG: 11 MMHG
BH CV ECHO MEAS - AO ROOT AREA (BSA CORRECTED): 1.7
BH CV ECHO MEAS - AO ROOT AREA: 8 CM^2
BH CV ECHO MEAS - AO ROOT DIAM: 3.2 CM
BH CV ECHO MEAS - AO V2 MAX: 256 CM/SEC
BH CV ECHO MEAS - AO V2 MEAN: 154 CM/SEC
BH CV ECHO MEAS - AO V2 VTI: 43.8 CM
BH CV ECHO MEAS - ASC AORTA: 3 CM
BH CV ECHO MEAS - AVA(I,A): 1.7 CM^2
BH CV ECHO MEAS - AVA(I,D): 1.7 CM^2
BH CV ECHO MEAS - BSA(HAYCOCK): 1.9 M^2
BH CV ECHO MEAS - BSA: 1.9 M^2
BH CV ECHO MEAS - BZI_BMI: 25.1 KILOGRAMS/M^2
BH CV ECHO MEAS - BZI_METRIC_HEIGHT: 172.7 CM
BH CV ECHO MEAS - BZI_METRIC_WEIGHT: 74.8 KG
BH CV ECHO MEAS - CONTRAST EF (2CH): 58.2 ML/M^2
BH CV ECHO MEAS - CONTRAST EF 4CH: 55.6 ML/M^2
BH CV ECHO MEAS - EDV(CUBED): 132.7 ML
BH CV ECHO MEAS - EDV(MOD-SP2): 98 ML
BH CV ECHO MEAS - EDV(MOD-SP4): 144 ML
BH CV ECHO MEAS - EDV(TEICH): 123.8 ML
BH CV ECHO MEAS - EF(CUBED): 64.8 %
BH CV ECHO MEAS - EF(MOD-SP2): 58.2 %
BH CV ECHO MEAS - EF(MOD-SP4): 55.6 %
BH CV ECHO MEAS - EF(TEICH): 56 %
BH CV ECHO MEAS - ESV(CUBED): 46.7 ML
BH CV ECHO MEAS - ESV(MOD-SP2): 41 ML
BH CV ECHO MEAS - ESV(MOD-SP4): 64 ML
BH CV ECHO MEAS - ESV(TEICH): 54.4 ML
BH CV ECHO MEAS - FS: 29.4 %
BH CV ECHO MEAS - IVS/LVPW: 0.91
BH CV ECHO MEAS - IVSD: 1 CM
BH CV ECHO MEAS - LAT PEAK E' VEL: 7 CM/SEC
BH CV ECHO MEAS - LV DIASTOLIC VOL/BSA (35-75): 76.5 ML/M^2
BH CV ECHO MEAS - LV MASS(C)D: 200.8 GRAMS
BH CV ECHO MEAS - LV MASS(C)DI: 106.6 GRAMS/M^2
BH CV ECHO MEAS - LV MEAN PG: 4 MMHG
BH CV ECHO MEAS - LV SYSTOLIC VOL/BSA (12-30): 34 ML/M^2
BH CV ECHO MEAS - LV V1 MAX: 138 CM/SEC
BH CV ECHO MEAS - LV V1 MEAN: 87.8 CM/SEC
BH CV ECHO MEAS - LV V1 VTI: 20.9 CM
BH CV ECHO MEAS - LVIDD: 5.1 CM
BH CV ECHO MEAS - LVIDS: 3.6 CM
BH CV ECHO MEAS - LVLD AP2: 8.5 CM
BH CV ECHO MEAS - LVLD AP4: 8.9 CM
BH CV ECHO MEAS - LVLS AP2: 6.9 CM
BH CV ECHO MEAS - LVLS AP4: 8.3 CM
BH CV ECHO MEAS - LVOT AREA (M): 3.5 CM^2
BH CV ECHO MEAS - LVOT AREA: 3.5 CM^2
BH CV ECHO MEAS - LVOT DIAM: 2.1 CM
BH CV ECHO MEAS - LVPWD: 1.1 CM
BH CV ECHO MEAS - MED PEAK E' VEL: 7 CM/SEC
BH CV ECHO MEAS - MV A DUR: 0.12 SEC
BH CV ECHO MEAS - MV A MAX VEL: 116 CM/SEC
BH CV ECHO MEAS - MV DEC SLOPE: 416 CM/SEC^2
BH CV ECHO MEAS - MV DEC TIME: 0.18 SEC
BH CV ECHO MEAS - MV E MAX VEL: 64.7 CM/SEC
BH CV ECHO MEAS - MV E/A: 0.56
BH CV ECHO MEAS - MV MAX PG: 7 MMHG
BH CV ECHO MEAS - MV MEAN PG: 2 MMHG
BH CV ECHO MEAS - MV P1/2T MAX VEL: 89.6 CM/SEC
BH CV ECHO MEAS - MV P1/2T: 63.1 MSEC
BH CV ECHO MEAS - MV V2 MEAN: 66.8 CM/SEC
BH CV ECHO MEAS - MV V2 VTI: 31.9 CM
BH CV ECHO MEAS - MVA P1/2T LCG: 2.5 CM^2
BH CV ECHO MEAS - MVA(P1/2T): 3.5 CM^2
BH CV ECHO MEAS - MVA(VTI): 2.3 CM^2
BH CV ECHO MEAS - PA ACC SLOPE: 41.3 CM/SEC^2
BH CV ECHO MEAS - PA ACC TIME: 0.1 SEC
BH CV ECHO MEAS - PA MAX PG (FULL): 2.7 MMHG
BH CV ECHO MEAS - PA MAX PG: 7.1 MMHG
BH CV ECHO MEAS - PA PR(ACCEL): 34.5 MMHG
BH CV ECHO MEAS - PA V2 MAX: 133 CM/SEC
BH CV ECHO MEAS - PULM A REVS DUR: 0.09 SEC
BH CV ECHO MEAS - PULM A REVS VEL: 41.6 CM/SEC
BH CV ECHO MEAS - PULM DIAS VEL: 41.6 CM/SEC
BH CV ECHO MEAS - PULM S/D: 1.7
BH CV ECHO MEAS - PULM SYS VEL: 68.8 CM/SEC
BH CV ECHO MEAS - PVA(V,A): 2.5 CM^2
BH CV ECHO MEAS - PVA(V,D): 2.5 CM^2
BH CV ECHO MEAS - QP/QS: 0.83
BH CV ECHO MEAS - RV MAX PG: 4.4 MMHG
BH CV ECHO MEAS - RV MEAN PG: 2 MMHG
BH CV ECHO MEAS - RV V1 MAX: 105 CM/SEC
BH CV ECHO MEAS - RV V1 MEAN: 66.2 CM/SEC
BH CV ECHO MEAS - RV V1 VTI: 19.1 CM
BH CV ECHO MEAS - RVOT AREA: 3.1 CM^2
BH CV ECHO MEAS - RVOT DIAM: 2 CM
BH CV ECHO MEAS - SI(AO): 187 ML/M^2
BH CV ECHO MEAS - SI(CUBED): 45.7 ML/M^2
BH CV ECHO MEAS - SI(LVOT): 38.4 ML/M^2
BH CV ECHO MEAS - SI(MOD-SP2): 30.3 ML/M^2
BH CV ECHO MEAS - SI(MOD-SP4): 42.5 ML/M^2
BH CV ECHO MEAS - SI(TEICH): 36.8 ML/M^2
BH CV ECHO MEAS - SUP REN AO DIAM: 2.1 CM
BH CV ECHO MEAS - SV(AO): 352.3 ML
BH CV ECHO MEAS - SV(CUBED): 86 ML
BH CV ECHO MEAS - SV(LVOT): 72.4 ML
BH CV ECHO MEAS - SV(MOD-SP2): 57 ML
BH CV ECHO MEAS - SV(MOD-SP4): 80 ML
BH CV ECHO MEAS - SV(RVOT): 60 ML
BH CV ECHO MEAS - SV(TEICH): 69.4 ML
BH CV ECHO MEAS - TAPSE (>1.6): 1.6 CM2
BH CV VAS BP RIGHT ARM: NORMAL MMHG
BH CV XLRA - RV BASE: 3.1 CM
BH CV XLRA - TDI S': 14 CM/SEC
BUN BLD-MCNC: 14 MG/DL (ref 8–23)
BUN/CREAT SERPL: 8.8 (ref 7–25)
CALCIUM SPEC-SCNC: 7.3 MG/DL (ref 8.6–10.5)
CHLORIDE SERPL-SCNC: 110 MMOL/L (ref 98–107)
CHLORIDE UR-SCNC: 47 MMOL/L
CO2 BLDA-SCNC: 17 MMOL/L (ref 24–29)
CO2 BLDA-SCNC: 17 MMOL/L (ref 24–29)
CO2 SERPL-SCNC: 20.1 MMOL/L (ref 22–29)
CREAT BLD-MCNC: 1.6 MG/DL (ref 0.76–1.27)
CREAT UR-MCNC: 87.3 MG/DL
DEPRECATED RDW RBC AUTO: 48.9 FL (ref 37–54)
E/E' RATIO: 9
EOSINOPHIL # BLD AUTO: 0.04 10*3/MM3 (ref 0–0.7)
EOSINOPHIL NFR BLD AUTO: 0.3 % (ref 0.3–6.2)
EOSINOPHIL SPEC QL MICRO: 0 % EOS/100 CELLS (ref 0–0)
ERYTHROCYTE [DISTWIDTH] IN BLOOD BY AUTOMATED COUNT: 14.8 % (ref 11.5–14.5)
GFR SERPL CREATININE-BSD FRML MDRD: 43 ML/MIN/1.73
GLUCOSE BLD-MCNC: 100 MG/DL (ref 65–99)
GLUCOSE BLDC GLUCOMTR-MCNC: 135 MG/DL (ref 70–130)
GLUCOSE BLDC GLUCOMTR-MCNC: 135 MG/DL (ref 70–130)
HCO3 BLDA-SCNC: 15.2 MMOL/L (ref 22–26)
HCO3 BLDA-SCNC: 15.3 MMOL/L (ref 22–26)
HCO3 BLDA-SCNC: 23.3 MMOL/L (ref 22–28)
HCT VFR BLD AUTO: 41.9 % (ref 40.4–52.2)
HCT VFR BLDA CALC: 31 % (ref 38–51)
HCT VFR BLDA CALC: 31 % (ref 38–51)
HGB BLD-MCNC: 14 G/DL (ref 13.7–17.6)
HGB BLDA-MCNC: 10.5 G/DL (ref 12–17)
HGB BLDA-MCNC: 10.5 G/DL (ref 12–17)
HOROWITZ INDEX BLD+IHG-RTO: 30 %
IMM GRANULOCYTES # BLD: 0.04 10*3/MM3 (ref 0–0.03)
IMM GRANULOCYTES NFR BLD: 0.3 % (ref 0–0.5)
INR PPP: 1.22 (ref 0.9–1.1)
LEFT ATRIUM VOLUME INDEX: 18 ML/M2
LV EF 2D ECHO EST: 56 %
LYMPHOCYTES # BLD AUTO: 0.94 10*3/MM3 (ref 0.9–4.8)
LYMPHOCYTES NFR BLD AUTO: 6.6 % (ref 19.6–45.3)
MCH RBC QN AUTO: 30.4 PG (ref 27–32.7)
MCHC RBC AUTO-ENTMCNC: 33.4 G/DL (ref 32.6–36.4)
MCV RBC AUTO: 90.9 FL (ref 79.8–96.2)
MODALITY: ABNORMAL
MONOCYTES # BLD AUTO: 0.92 10*3/MM3 (ref 0.2–1.2)
MONOCYTES NFR BLD AUTO: 6.5 % (ref 5–12)
NEUTROPHILS # BLD AUTO: 12.2 10*3/MM3 (ref 1.9–8.1)
NEUTROPHILS NFR BLD AUTO: 86.2 % (ref 42.7–76)
O2 A-A PPRESDIFF RESPIRATORY: 0.4 MMHG
PCO2 BLDA: 42.5 MM HG (ref 35–45)
PCO2 BLDA: 42.6 MM HG (ref 35–45)
PCO2 BLDA: 43 MM HG (ref 35–45)
PH BLDA: 7.16 PH UNITS (ref 7.35–7.6)
PH BLDA: 7.16 PH UNITS (ref 7.35–7.6)
PH BLDA: 7.35 PH UNITS (ref 7.35–7.45)
PLATELET # BLD AUTO: 124 10*3/MM3 (ref 140–500)
PMV BLD AUTO: 11 FL (ref 6–12)
PO2 BLDA: 64 MMHG (ref 80–105)
PO2 BLDA: 65 MMHG (ref 80–105)
PO2 BLDA: 76.1 MM HG (ref 80–100)
POTASSIUM BLD-SCNC: 4.2 MMOL/L (ref 3.5–5.2)
POTASSIUM BLDA-SCNC: 3.5 MMOL/L (ref 3.5–4.9)
POTASSIUM BLDA-SCNC: 3.5 MMOL/L (ref 3.5–4.9)
PROT UR-MCNC: 55 MG/DL
PROT/CREAT UR: 630 MG/G CREA
PROTHROMBIN TIME: 15 SECONDS (ref 11.7–14.2)
PSV: 6 CMH2O
RBC # BLD AUTO: 4.61 10*6/MM3 (ref 4.6–6)
SAO2 % BLDA: 85 % (ref 95–98)
SAO2 % BLDA: 86 % (ref 95–98)
SAO2 % BLDCOA: 94.3 % (ref 92–99)
SODIUM BLD-SCNC: 145 MMOL/L (ref 136–145)
SODIUM UR-SCNC: 64 MMOL/L
TOTAL RATE: 19 BREATHS/MINUTE
TRIGL SERPL-MCNC: 183 MG/DL (ref 0–150)
TROPONIN T SERPL-MCNC: 1.66 NG/ML (ref 0–0.03)
TROPONIN T SERPL-MCNC: 4.1 NG/ML (ref 0–0.03)
UNIT  ABO: NORMAL
UNIT  RH: NORMAL
VENTILATOR MODE: ABNORMAL
VT ON VENT VENT: 541 ML
WBC NRBC COR # BLD: 14.15 10*3/MM3 (ref 4.5–10.7)

## 2017-07-19 PROCEDURE — 84484 ASSAY OF TROPONIN QUANT: CPT | Performed by: INTERNAL MEDICINE

## 2017-07-19 PROCEDURE — 85025 COMPLETE CBC W/AUTO DIFF WBC: CPT | Performed by: SURGERY

## 2017-07-19 PROCEDURE — 94799 UNLISTED PULMONARY SVC/PX: CPT

## 2017-07-19 PROCEDURE — 93005 ELECTROCARDIOGRAM TRACING: CPT | Performed by: SURGERY

## 2017-07-19 PROCEDURE — 85730 THROMBOPLASTIN TIME PARTIAL: CPT | Performed by: SURGERY

## 2017-07-19 PROCEDURE — 82803 BLOOD GASES ANY COMBINATION: CPT

## 2017-07-19 PROCEDURE — 80048 BASIC METABOLIC PNL TOTAL CA: CPT | Performed by: SURGERY

## 2017-07-19 PROCEDURE — 71010 HC CHEST PA OR AP: CPT

## 2017-07-19 PROCEDURE — 93010 ELECTROCARDIOGRAM REPORT: CPT | Performed by: INTERNAL MEDICINE

## 2017-07-19 PROCEDURE — 84156 ASSAY OF PROTEIN URINE: CPT | Performed by: INTERNAL MEDICINE

## 2017-07-19 PROCEDURE — 99223 1ST HOSP IP/OBS HIGH 75: CPT | Performed by: INTERNAL MEDICINE

## 2017-07-19 PROCEDURE — 71250 CT THORAX DX C-: CPT

## 2017-07-19 PROCEDURE — 85610 PROTHROMBIN TIME: CPT | Performed by: SURGERY

## 2017-07-19 PROCEDURE — 93306 TTE W/DOPPLER COMPLETE: CPT | Performed by: INTERNAL MEDICINE

## 2017-07-19 PROCEDURE — 82436 ASSAY OF URINE CHLORIDE: CPT | Performed by: INTERNAL MEDICINE

## 2017-07-19 PROCEDURE — 87205 SMEAR GRAM STAIN: CPT | Performed by: INTERNAL MEDICINE

## 2017-07-19 PROCEDURE — 84484 ASSAY OF TROPONIN QUANT: CPT | Performed by: SURGERY

## 2017-07-19 PROCEDURE — 84478 ASSAY OF TRIGLYCERIDES: CPT | Performed by: INTERNAL MEDICINE

## 2017-07-19 PROCEDURE — 25010000003 CEFAZOLIN IN DEXTROSE 2-4 GM/100ML-% SOLUTION: Performed by: SURGERY

## 2017-07-19 PROCEDURE — 25010000002 HYDROMORPHONE PER 4 MG: Performed by: SURGERY

## 2017-07-19 PROCEDURE — 94640 AIRWAY INHALATION TREATMENT: CPT

## 2017-07-19 PROCEDURE — 93306 TTE W/DOPPLER COMPLETE: CPT

## 2017-07-19 PROCEDURE — 25010000002 PROPOFOL 10 MG/ML EMULSION: Performed by: SURGERY

## 2017-07-19 PROCEDURE — 82570 ASSAY OF URINE CREATININE: CPT | Performed by: INTERNAL MEDICINE

## 2017-07-19 PROCEDURE — 84300 ASSAY OF URINE SODIUM: CPT | Performed by: INTERNAL MEDICINE

## 2017-07-19 PROCEDURE — 25010000002 PROPOFOL 1000 MG/ML EMULSION: Performed by: SURGERY

## 2017-07-19 PROCEDURE — 94003 VENT MGMT INPAT SUBQ DAY: CPT

## 2017-07-19 RX ORDER — FAMOTIDINE 10 MG/ML
20 INJECTION, SOLUTION INTRAVENOUS 2 TIMES DAILY
Status: DISCONTINUED | OUTPATIENT
Start: 2017-07-19 | End: 2017-07-19

## 2017-07-19 RX ORDER — FAMOTIDINE 10 MG/ML
20 INJECTION, SOLUTION INTRAVENOUS DAILY
Status: DISCONTINUED | OUTPATIENT
Start: 2017-07-20 | End: 2017-07-20 | Stop reason: CLARIF

## 2017-07-19 RX ORDER — IPRATROPIUM BROMIDE AND ALBUTEROL SULFATE 2.5; .5 MG/3ML; MG/3ML
3 SOLUTION RESPIRATORY (INHALATION)
Status: DISCONTINUED | OUTPATIENT
Start: 2017-07-19 | End: 2017-07-29 | Stop reason: HOSPADM

## 2017-07-19 RX ORDER — IPRATROPIUM BROMIDE AND ALBUTEROL SULFATE 2.5; .5 MG/3ML; MG/3ML
SOLUTION RESPIRATORY (INHALATION)
Status: COMPLETED
Start: 2017-07-19 | End: 2017-07-19

## 2017-07-19 RX ADMIN — METOPROLOL TARTRATE 12.5 MG: 25 TABLET ORAL at 12:06

## 2017-07-19 RX ADMIN — METOPROLOL TARTRATE 12.5 MG: 25 TABLET ORAL at 21:06

## 2017-07-19 RX ADMIN — IPRATROPIUM BROMIDE AND ALBUTEROL SULFATE 3 ML: .5; 3 SOLUTION RESPIRATORY (INHALATION) at 23:50

## 2017-07-19 RX ADMIN — CLEVIDIPINE 8 MG/HR: 0.5 EMULSION INTRAVENOUS at 14:25

## 2017-07-19 RX ADMIN — HYDROMORPHONE HYDROCHLORIDE 0.5 MG: 1 INJECTION, SOLUTION INTRAMUSCULAR; INTRAVENOUS; SUBCUTANEOUS at 05:09

## 2017-07-19 RX ADMIN — FAMOTIDINE 20 MG: 10 INJECTION INTRAVENOUS at 10:22

## 2017-07-19 RX ADMIN — CEFAZOLIN SODIUM 2 G: 2 INJECTION, SOLUTION INTRAVENOUS at 06:57

## 2017-07-19 RX ADMIN — IPRATROPIUM BROMIDE AND ALBUTEROL SULFATE 3 ML: .5; 3 SOLUTION RESPIRATORY (INHALATION) at 14:36

## 2017-07-19 RX ADMIN — IPRATROPIUM BROMIDE AND ALBUTEROL SULFATE 3 ML: .5; 3 SOLUTION RESPIRATORY (INHALATION) at 19:30

## 2017-07-19 RX ADMIN — HYDROMORPHONE HYDROCHLORIDE 0.5 MG: 1 INJECTION, SOLUTION INTRAMUSCULAR; INTRAVENOUS; SUBCUTANEOUS at 07:35

## 2017-07-19 RX ADMIN — HYDROMORPHONE HYDROCHLORIDE 0.5 MG: 1 INJECTION, SOLUTION INTRAMUSCULAR; INTRAVENOUS; SUBCUTANEOUS at 18:17

## 2017-07-19 RX ADMIN — CLEVIDIPINE 9 MG/HR: 0.5 EMULSION INTRAVENOUS at 16:18

## 2017-07-19 RX ADMIN — CLEVIDIPINE 9 MG/HR: 0.5 EMULSION INTRAVENOUS at 11:14

## 2017-07-19 RX ADMIN — CLEVIDIPINE 14 MG/HR: 0.5 EMULSION INTRAVENOUS at 23:15

## 2017-07-19 RX ADMIN — CLEVIDIPINE 3 MG/HR: 0.5 EMULSION INTRAVENOUS at 07:59

## 2017-07-19 RX ADMIN — SODIUM CHLORIDE 100 ML/HR: 9 INJECTION, SOLUTION INTRAVENOUS at 19:57

## 2017-07-19 RX ADMIN — PROPOFOL 40 MCG/KG/MIN: 10 INJECTION, EMULSION INTRAVENOUS at 03:40

## 2017-07-19 RX ADMIN — NICOTINE 1 PATCH: 21 PATCH, EXTENDED RELEASE TRANSDERMAL at 21:08

## 2017-07-19 RX ADMIN — PANTOPRAZOLE SODIUM 40 MG: 40 INJECTION, POWDER, FOR SOLUTION INTRAVENOUS at 06:57

## 2017-07-19 RX ADMIN — CEFAZOLIN SODIUM 2 G: 2 INJECTION, SOLUTION INTRAVENOUS at 00:06

## 2017-07-19 RX ADMIN — NICOTINE 1 PATCH: 21 PATCH, EXTENDED RELEASE TRANSDERMAL at 00:05

## 2017-07-19 RX ADMIN — HYDROMORPHONE HYDROCHLORIDE 0.5 MG: 1 INJECTION, SOLUTION INTRAMUSCULAR; INTRAVENOUS; SUBCUTANEOUS at 11:14

## 2017-07-19 RX ADMIN — CLEVIDIPINE 14 MG/HR: 0.5 EMULSION INTRAVENOUS at 21:06

## 2017-07-19 RX ADMIN — PROPOFOL 30 MCG/KG/MIN: 10 INJECTION, EMULSION INTRAVENOUS at 07:59

## 2017-07-19 RX ADMIN — CLEVIDIPINE 12 MG/HR: 0.5 EMULSION INTRAVENOUS at 18:38

## 2017-07-19 RX ADMIN — HYDROMORPHONE HYDROCHLORIDE 0.5 MG: 1 INJECTION, SOLUTION INTRAMUSCULAR; INTRAVENOUS; SUBCUTANEOUS at 14:00

## 2017-07-20 LAB
ALBUMIN SERPL-MCNC: 3.1 G/DL (ref 3.5–5.2)
ALBUMIN/GLOB SERPL: 1 G/DL
ALP SERPL-CCNC: 78 U/L (ref 39–117)
ALT SERPL W P-5'-P-CCNC: 31 U/L (ref 1–41)
ANION GAP SERPL CALCULATED.3IONS-SCNC: 14.5 MMOL/L
APTT PPP: 31.2 SECONDS (ref 22.7–35.4)
AST SERPL-CCNC: 109 U/L (ref 1–40)
BACTERIA SPEC AEROBE CULT: NO GROWTH
BASOPHILS # BLD AUTO: 0.02 10*3/MM3 (ref 0–0.2)
BASOPHILS NFR BLD AUTO: 0.1 % (ref 0–1.5)
BILIRUB SERPL-MCNC: 0.5 MG/DL (ref 0.1–1.2)
BUN BLD-MCNC: 14 MG/DL (ref 8–23)
BUN/CREAT SERPL: 8.9 (ref 7–25)
CALCIUM SPEC-SCNC: 7.8 MG/DL (ref 8.6–10.5)
CHLORIDE SERPL-SCNC: 108 MMOL/L (ref 98–107)
CO2 SERPL-SCNC: 19.5 MMOL/L (ref 22–29)
CREAT BLD-MCNC: 1.57 MG/DL (ref 0.76–1.27)
DEPRECATED RDW RBC AUTO: 51.6 FL (ref 37–54)
EOSINOPHIL # BLD AUTO: 0.02 10*3/MM3 (ref 0–0.7)
EOSINOPHIL NFR BLD AUTO: 0.1 % (ref 0.3–6.2)
ERYTHROCYTE [DISTWIDTH] IN BLOOD BY AUTOMATED COUNT: 15.3 % (ref 11.5–14.5)
GFR SERPL CREATININE-BSD FRML MDRD: 44 ML/MIN/1.73
GLOBULIN UR ELPH-MCNC: 3.2 GM/DL
GLUCOSE BLD-MCNC: 139 MG/DL (ref 65–99)
HCT VFR BLD AUTO: 39.5 % (ref 40.4–52.2)
HGB BLD-MCNC: 13.2 G/DL (ref 13.7–17.6)
IMM GRANULOCYTES # BLD: 0.05 10*3/MM3 (ref 0–0.03)
IMM GRANULOCYTES NFR BLD: 0.2 % (ref 0–0.5)
INR PPP: 1.12 (ref 0.9–1.1)
LYMPHOCYTES # BLD AUTO: 0.79 10*3/MM3 (ref 0.9–4.8)
LYMPHOCYTES NFR BLD AUTO: 3.8 % (ref 19.6–45.3)
MAGNESIUM SERPL-MCNC: 2.2 MG/DL (ref 1.6–2.4)
MCH RBC QN AUTO: 30.8 PG (ref 27–32.7)
MCHC RBC AUTO-ENTMCNC: 33.4 G/DL (ref 32.6–36.4)
MCV RBC AUTO: 92.3 FL (ref 79.8–96.2)
MONOCYTES # BLD AUTO: 1.45 10*3/MM3 (ref 0.2–1.2)
MONOCYTES NFR BLD AUTO: 7 % (ref 5–12)
NEUTROPHILS # BLD AUTO: 18.34 10*3/MM3 (ref 1.9–8.1)
NEUTROPHILS NFR BLD AUTO: 88.8 % (ref 42.7–76)
PHOSPHATE SERPL-MCNC: 2.2 MG/DL (ref 2.5–4.5)
PLATELET # BLD AUTO: 100 10*3/MM3 (ref 140–500)
PMV BLD AUTO: 12.4 FL (ref 6–12)
POTASSIUM BLD-SCNC: 3.9 MMOL/L (ref 3.5–5.2)
PROCALCITONIN SERPL-MCNC: 1.7 NG/ML (ref 0.1–0.25)
PROT SERPL-MCNC: 6.3 G/DL (ref 6–8.5)
PROTHROMBIN TIME: 14 SECONDS (ref 11.7–14.2)
RBC # BLD AUTO: 4.28 10*6/MM3 (ref 4.6–6)
SODIUM BLD-SCNC: 142 MMOL/L (ref 136–145)
TRIGL SERPL-MCNC: 277 MG/DL (ref 0–150)
TROPONIN T SERPL-MCNC: 1.66 NG/ML (ref 0–0.03)
URATE SERPL-MCNC: 4.1 MG/DL (ref 3.4–7)
WBC NRBC COR # BLD: 20.67 10*3/MM3 (ref 4.5–10.7)

## 2017-07-20 PROCEDURE — 94799 UNLISTED PULMONARY SVC/PX: CPT

## 2017-07-20 PROCEDURE — 80053 COMPREHEN METABOLIC PANEL: CPT | Performed by: INTERNAL MEDICINE

## 2017-07-20 PROCEDURE — 84550 ASSAY OF BLOOD/URIC ACID: CPT | Performed by: INTERNAL MEDICINE

## 2017-07-20 PROCEDURE — 84484 ASSAY OF TROPONIN QUANT: CPT | Performed by: INTERNAL MEDICINE

## 2017-07-20 PROCEDURE — 83735 ASSAY OF MAGNESIUM: CPT | Performed by: INTERNAL MEDICINE

## 2017-07-20 PROCEDURE — 85025 COMPLETE CBC W/AUTO DIFF WBC: CPT | Performed by: INTERNAL MEDICINE

## 2017-07-20 PROCEDURE — 25010000002 PIPERACILLIN SOD-TAZOBACTAM PER 1 G: Performed by: INTERNAL MEDICINE

## 2017-07-20 PROCEDURE — 99233 SBSQ HOSP IP/OBS HIGH 50: CPT | Performed by: INTERNAL MEDICINE

## 2017-07-20 PROCEDURE — 84478 ASSAY OF TRIGLYCERIDES: CPT | Performed by: INTERNAL MEDICINE

## 2017-07-20 PROCEDURE — 85610 PROTHROMBIN TIME: CPT | Performed by: SURGERY

## 2017-07-20 PROCEDURE — 84100 ASSAY OF PHOSPHORUS: CPT | Performed by: INTERNAL MEDICINE

## 2017-07-20 PROCEDURE — 84145 PROCALCITONIN (PCT): CPT | Performed by: INTERNAL MEDICINE

## 2017-07-20 PROCEDURE — 85730 THROMBOPLASTIN TIME PARTIAL: CPT | Performed by: SURGERY

## 2017-07-20 PROCEDURE — 97110 THERAPEUTIC EXERCISES: CPT

## 2017-07-20 PROCEDURE — 87040 BLOOD CULTURE FOR BACTERIA: CPT | Performed by: INTERNAL MEDICINE

## 2017-07-20 PROCEDURE — 97162 PT EVAL MOD COMPLEX 30 MIN: CPT

## 2017-07-20 RX ORDER — ZOLPIDEM TARTRATE 5 MG/1
5 TABLET ORAL NIGHTLY PRN
Status: DISCONTINUED | OUTPATIENT
Start: 2017-07-20 | End: 2017-07-29 | Stop reason: HOSPADM

## 2017-07-20 RX ORDER — FAMOTIDINE 20 MG/1
20 TABLET, FILM COATED ORAL DAILY
Status: DISCONTINUED | OUTPATIENT
Start: 2017-07-20 | End: 2017-07-21 | Stop reason: ALTCHOICE

## 2017-07-20 RX ORDER — METOPROLOL TARTRATE 50 MG/1
50 TABLET, FILM COATED ORAL EVERY 12 HOURS SCHEDULED
Status: DISCONTINUED | OUTPATIENT
Start: 2017-07-20 | End: 2017-07-21

## 2017-07-20 RX ORDER — ACETAMINOPHEN 325 MG/1
650 TABLET ORAL EVERY 4 HOURS PRN
Status: DISCONTINUED | OUTPATIENT
Start: 2017-07-20 | End: 2017-07-29 | Stop reason: HOSPADM

## 2017-07-20 RX ORDER — HYDROCODONE BITARTRATE AND ACETAMINOPHEN 5; 325 MG/1; MG/1
1 TABLET ORAL EVERY 6 HOURS PRN
Status: DISCONTINUED | OUTPATIENT
Start: 2017-07-20 | End: 2017-07-29 | Stop reason: HOSPADM

## 2017-07-20 RX ORDER — ASPIRIN 81 MG/1
81 TABLET ORAL DAILY
Status: DISCONTINUED | OUTPATIENT
Start: 2017-07-20 | End: 2017-07-29 | Stop reason: HOSPADM

## 2017-07-20 RX ADMIN — CLEVIDIPINE 8 MG/HR: 0.5 EMULSION INTRAVENOUS at 15:04

## 2017-07-20 RX ADMIN — HYDROCODONE BITARTRATE AND ACETAMINOPHEN 1 TABLET: 5; 325 TABLET ORAL at 08:26

## 2017-07-20 RX ADMIN — IPRATROPIUM BROMIDE AND ALBUTEROL SULFATE 3 ML: .5; 3 SOLUTION RESPIRATORY (INHALATION) at 08:01

## 2017-07-20 RX ADMIN — CLEVIDIPINE 15 MG/HR: 0.5 EMULSION INTRAVENOUS at 03:44

## 2017-07-20 RX ADMIN — METOPROLOL TARTRATE 50 MG: 50 TABLET, FILM COATED ORAL at 21:13

## 2017-07-20 RX ADMIN — TAZOBACTAM SODIUM AND PIPERACILLIN SODIUM 3.38 G: 375; 3 INJECTION, SOLUTION INTRAVENOUS at 17:50

## 2017-07-20 RX ADMIN — CLEVIDIPINE 15 MG/HR: 0.5 EMULSION INTRAVENOUS at 07:17

## 2017-07-20 RX ADMIN — HYDROCODONE BITARTRATE AND ACETAMINOPHEN 1 TABLET: 5; 325 TABLET ORAL at 16:57

## 2017-07-20 RX ADMIN — IPRATROPIUM BROMIDE AND ALBUTEROL SULFATE 3 ML: .5; 3 SOLUTION RESPIRATORY (INHALATION) at 22:51

## 2017-07-20 RX ADMIN — CLEVIDIPINE 14 MG/HR: 0.5 EMULSION INTRAVENOUS at 00:28

## 2017-07-20 RX ADMIN — CLEVIDIPINE 12 MG/HR: 0.5 EMULSION INTRAVENOUS at 10:17

## 2017-07-20 RX ADMIN — ASPIRIN 81 MG: 81 TABLET ORAL at 08:26

## 2017-07-20 RX ADMIN — CLEVIDIPINE 10 MG/HR: 0.5 EMULSION INTRAVENOUS at 12:31

## 2017-07-20 RX ADMIN — CLEVIDIPINE 14 MG/HR: 0.5 EMULSION INTRAVENOUS at 02:43

## 2017-07-20 RX ADMIN — IPRATROPIUM BROMIDE AND ALBUTEROL SULFATE 3 ML: .5; 3 SOLUTION RESPIRATORY (INHALATION) at 13:22

## 2017-07-20 RX ADMIN — CLEVIDIPINE 6 MG/HR: 0.5 EMULSION INTRAVENOUS at 19:02

## 2017-07-20 RX ADMIN — CLEVIDIPINE 6 MG/HR: 0.5 EMULSION INTRAVENOUS at 22:27

## 2017-07-20 RX ADMIN — FAMOTIDINE 20 MG: 10 INJECTION INTRAVENOUS at 08:27

## 2017-07-20 RX ADMIN — TAZOBACTAM SODIUM AND PIPERACILLIN SODIUM 4.5 G: 500; 4 INJECTION, SOLUTION INTRAVENOUS at 10:17

## 2017-07-20 RX ADMIN — CLEVIDIPINE 15 MG/HR: 0.5 EMULSION INTRAVENOUS at 05:30

## 2017-07-20 RX ADMIN — CLEVIDIPINE 15 MG/HR: 0.5 EMULSION INTRAVENOUS at 08:45

## 2017-07-20 RX ADMIN — SODIUM CHLORIDE 100 ML/HR: 9 INJECTION, SOLUTION INTRAVENOUS at 06:45

## 2017-07-20 RX ADMIN — NICOTINE 1 PATCH: 21 PATCH, EXTENDED RELEASE TRANSDERMAL at 21:13

## 2017-07-20 RX ADMIN — METOPROLOL TARTRATE 50 MG: 50 TABLET, FILM COATED ORAL at 08:26

## 2017-07-20 RX ADMIN — IPRATROPIUM BROMIDE AND ALBUTEROL SULFATE 3 ML: .5; 3 SOLUTION RESPIRATORY (INHALATION) at 19:49

## 2017-07-21 LAB
ALBUMIN SERPL-MCNC: 2.9 G/DL (ref 3.5–5.2)
ALP SERPL-CCNC: 86 U/L (ref 39–117)
ALT SERPL W P-5'-P-CCNC: 27 U/L (ref 1–41)
ANION GAP SERPL CALCULATED.3IONS-SCNC: 12.3 MMOL/L
AST SERPL-CCNC: 68 U/L (ref 1–40)
BASOPHILS # BLD AUTO: 0.02 10*3/MM3 (ref 0–0.2)
BASOPHILS NFR BLD AUTO: 0.1 % (ref 0–1.5)
BILIRUB CONJ SERPL-MCNC: 0.5 MG/DL (ref 0–0.3)
BILIRUB INDIRECT SERPL-MCNC: 0.4 MG/DL
BILIRUB SERPL-MCNC: 0.9 MG/DL (ref 0.1–1.2)
BUN BLD-MCNC: 14 MG/DL (ref 8–23)
BUN/CREAT SERPL: 10.9 (ref 7–25)
CALCIUM SPEC-SCNC: 8.4 MG/DL (ref 8.6–10.5)
CHLORIDE SERPL-SCNC: 110 MMOL/L (ref 98–107)
CO2 SERPL-SCNC: 20.7 MMOL/L (ref 22–29)
CREAT BLD-MCNC: 1.29 MG/DL (ref 0.76–1.27)
DEPRECATED RDW RBC AUTO: 51.4 FL (ref 37–54)
EOSINOPHIL # BLD AUTO: 0.07 10*3/MM3 (ref 0–0.7)
EOSINOPHIL NFR BLD AUTO: 0.4 % (ref 0.3–6.2)
ERYTHROCYTE [DISTWIDTH] IN BLOOD BY AUTOMATED COUNT: 15.1 % (ref 11.5–14.5)
GFR SERPL CREATININE-BSD FRML MDRD: 56 ML/MIN/1.73
GLUCOSE BLD-MCNC: 112 MG/DL (ref 65–99)
GLUCOSE BLDC GLUCOMTR-MCNC: 107 MG/DL (ref 70–130)
HCT VFR BLD AUTO: 40 % (ref 40.4–52.2)
HGB BLD-MCNC: 13.2 G/DL (ref 13.7–17.6)
IMM GRANULOCYTES # BLD: 0.06 10*3/MM3 (ref 0–0.03)
IMM GRANULOCYTES NFR BLD: 0.3 % (ref 0–0.5)
LYMPHOCYTES # BLD AUTO: 0.68 10*3/MM3 (ref 0.9–4.8)
LYMPHOCYTES NFR BLD AUTO: 3.6 % (ref 19.6–45.3)
MAGNESIUM SERPL-MCNC: 2.3 MG/DL (ref 1.6–2.4)
MCH RBC QN AUTO: 30.5 PG (ref 27–32.7)
MCHC RBC AUTO-ENTMCNC: 33 G/DL (ref 32.6–36.4)
MCV RBC AUTO: 92.4 FL (ref 79.8–96.2)
MONOCYTES # BLD AUTO: 1.2 10*3/MM3 (ref 0.2–1.2)
MONOCYTES NFR BLD AUTO: 6.3 % (ref 5–12)
NEUTROPHILS # BLD AUTO: 17.03 10*3/MM3 (ref 1.9–8.1)
NEUTROPHILS NFR BLD AUTO: 89.3 % (ref 42.7–76)
PLATELET # BLD AUTO: 112 10*3/MM3 (ref 140–500)
PMV BLD AUTO: 11.3 FL (ref 6–12)
POTASSIUM BLD-SCNC: 3.6 MMOL/L (ref 3.5–5.2)
PROT SERPL-MCNC: 6.3 G/DL (ref 6–8.5)
RBC # BLD AUTO: 4.33 10*6/MM3 (ref 4.6–6)
SODIUM BLD-SCNC: 143 MMOL/L (ref 136–145)
TRIGL SERPL-MCNC: 144 MG/DL (ref 0–150)
URATE SERPL-MCNC: 3.5 MG/DL (ref 3.4–7)
WBC NRBC COR # BLD: 19.06 10*3/MM3 (ref 4.5–10.7)

## 2017-07-21 PROCEDURE — 80048 BASIC METABOLIC PNL TOTAL CA: CPT | Performed by: SURGERY

## 2017-07-21 PROCEDURE — 94799 UNLISTED PULMONARY SVC/PX: CPT

## 2017-07-21 PROCEDURE — 84550 ASSAY OF BLOOD/URIC ACID: CPT | Performed by: INTERNAL MEDICINE

## 2017-07-21 PROCEDURE — 25010000002 PIPERACILLIN SOD-TAZOBACTAM PER 1 G: Performed by: INTERNAL MEDICINE

## 2017-07-21 PROCEDURE — 82962 GLUCOSE BLOOD TEST: CPT

## 2017-07-21 PROCEDURE — 80076 HEPATIC FUNCTION PANEL: CPT | Performed by: INTERNAL MEDICINE

## 2017-07-21 PROCEDURE — 99233 SBSQ HOSP IP/OBS HIGH 50: CPT | Performed by: INTERNAL MEDICINE

## 2017-07-21 PROCEDURE — 85025 COMPLETE CBC W/AUTO DIFF WBC: CPT | Performed by: INTERNAL MEDICINE

## 2017-07-21 PROCEDURE — 84478 ASSAY OF TRIGLYCERIDES: CPT | Performed by: INTERNAL MEDICINE

## 2017-07-21 PROCEDURE — 97110 THERAPEUTIC EXERCISES: CPT

## 2017-07-21 PROCEDURE — 83735 ASSAY OF MAGNESIUM: CPT | Performed by: INTERNAL MEDICINE

## 2017-07-21 RX ORDER — METOPROLOL TARTRATE 100 MG/1
100 TABLET ORAL EVERY 12 HOURS SCHEDULED
Status: DISCONTINUED | OUTPATIENT
Start: 2017-07-21 | End: 2017-07-25

## 2017-07-21 RX ORDER — CAPTOPRIL 12.5 MG/1
6.25 TABLET ORAL EVERY 8 HOURS SCHEDULED
Status: DISCONTINUED | OUTPATIENT
Start: 2017-07-21 | End: 2017-07-21

## 2017-07-21 RX ORDER — PANTOPRAZOLE SODIUM 40 MG/1
40 TABLET, DELAYED RELEASE ORAL
Status: DISCONTINUED | OUTPATIENT
Start: 2017-07-21 | End: 2017-07-23

## 2017-07-21 RX ORDER — POTASSIUM CHLORIDE 750 MG/1
40 CAPSULE, EXTENDED RELEASE ORAL ONCE
Status: COMPLETED | OUTPATIENT
Start: 2017-07-21 | End: 2017-07-21

## 2017-07-21 RX ORDER — CAPTOPRIL 12.5 MG/1
12.5 TABLET ORAL EVERY 8 HOURS SCHEDULED
Status: DISCONTINUED | OUTPATIENT
Start: 2017-07-21 | End: 2017-07-22

## 2017-07-21 RX ADMIN — TAZOBACTAM SODIUM AND PIPERACILLIN SODIUM 3.38 G: 375; 3 INJECTION, SOLUTION INTRAVENOUS at 17:55

## 2017-07-21 RX ADMIN — METOPROLOL TARTRATE 100 MG: 100 TABLET ORAL at 21:45

## 2017-07-21 RX ADMIN — IPRATROPIUM BROMIDE AND ALBUTEROL SULFATE 3 ML: .5; 3 SOLUTION RESPIRATORY (INHALATION) at 19:40

## 2017-07-21 RX ADMIN — IPRATROPIUM BROMIDE AND ALBUTEROL SULFATE 3 ML: .5; 3 SOLUTION RESPIRATORY (INHALATION) at 07:46

## 2017-07-21 RX ADMIN — METOPROLOL TARTRATE 100 MG: 100 TABLET ORAL at 08:24

## 2017-07-21 RX ADMIN — IPRATROPIUM BROMIDE AND ALBUTEROL SULFATE 3 ML: .5; 3 SOLUTION RESPIRATORY (INHALATION) at 12:34

## 2017-07-21 RX ADMIN — ASPIRIN 81 MG: 81 TABLET ORAL at 08:24

## 2017-07-21 RX ADMIN — PANTOPRAZOLE SODIUM 40 MG: 40 TABLET, DELAYED RELEASE ORAL at 08:32

## 2017-07-21 RX ADMIN — CLEVIDIPINE 6 MG/HR: 0.5 EMULSION INTRAVENOUS at 07:08

## 2017-07-21 RX ADMIN — CLEVIDIPINE 6 MG/HR: 0.5 EMULSION INTRAVENOUS at 01:51

## 2017-07-21 RX ADMIN — ACETAMINOPHEN 650 MG: 325 TABLET ORAL at 03:08

## 2017-07-21 RX ADMIN — TAZOBACTAM SODIUM AND PIPERACILLIN SODIUM 3.38 G: 375; 3 INJECTION, SOLUTION INTRAVENOUS at 03:08

## 2017-07-21 RX ADMIN — POTASSIUM CHLORIDE 40 MEQ: 750 CAPSULE, EXTENDED RELEASE ORAL at 09:23

## 2017-07-21 RX ADMIN — IPRATROPIUM BROMIDE AND ALBUTEROL SULFATE 3 ML: .5; 3 SOLUTION RESPIRATORY (INHALATION) at 23:14

## 2017-07-21 RX ADMIN — CAPTOPRIL 6.25 MG: 12.5 TABLET ORAL at 13:45

## 2017-07-21 RX ADMIN — CLEVIDIPINE 2 MG/HR: 0.5 EMULSION INTRAVENOUS at 18:48

## 2017-07-21 RX ADMIN — CAPTOPRIL 6.25 MG: 12.5 TABLET ORAL at 09:22

## 2017-07-21 RX ADMIN — CAPTOPRIL 12.5 MG: 12.5 TABLET ORAL at 21:45

## 2017-07-21 RX ADMIN — CLEVIDIPINE 4 MG/HR: 0.5 EMULSION INTRAVENOUS at 09:22

## 2017-07-21 RX ADMIN — NICOTINE 1 PATCH: 21 PATCH, EXTENDED RELEASE TRANSDERMAL at 22:00

## 2017-07-21 RX ADMIN — TAZOBACTAM SODIUM AND PIPERACILLIN SODIUM 3.38 G: 375; 3 INJECTION, SOLUTION INTRAVENOUS at 09:23

## 2017-07-22 LAB
ABO + RH BLD: NORMAL
ANION GAP SERPL CALCULATED.3IONS-SCNC: 16.3 MMOL/L
BH BB BLOOD EXPIRATION DATE: NORMAL
BH BB BLOOD TYPE BARCODE: 5100
BH BB DISPENSE STATUS: NORMAL
BH BB PRODUCT CODE: NORMAL
BH BB UNIT NUMBER: NORMAL
BUN BLD-MCNC: 16 MG/DL (ref 8–23)
BUN/CREAT SERPL: 13.6 (ref 7–25)
CALCIUM SPEC-SCNC: 8.1 MG/DL (ref 8.6–10.5)
CHLORIDE SERPL-SCNC: 108 MMOL/L (ref 98–107)
CO2 SERPL-SCNC: 19.7 MMOL/L (ref 22–29)
CREAT BLD-MCNC: 1.18 MG/DL (ref 0.76–1.27)
CROSSMATCH INTERPRETATION: NORMAL
CROSSMATCH INTERPRETATION: NORMAL
DEPRECATED RDW RBC AUTO: 52.4 FL (ref 37–54)
ERYTHROCYTE [DISTWIDTH] IN BLOOD BY AUTOMATED COUNT: 15.4 % (ref 11.5–14.5)
GFR SERPL CREATININE-BSD FRML MDRD: 62 ML/MIN/1.73
GLUCOSE BLD-MCNC: 99 MG/DL (ref 65–99)
HCT VFR BLD AUTO: 41.8 % (ref 40.4–52.2)
HGB BLD-MCNC: 13.7 G/DL (ref 13.7–17.6)
MCH RBC QN AUTO: 30.6 PG (ref 27–32.7)
MCHC RBC AUTO-ENTMCNC: 32.8 G/DL (ref 32.6–36.4)
MCV RBC AUTO: 93.3 FL (ref 79.8–96.2)
PHOSPHATE SERPL-MCNC: 2.5 MG/DL (ref 2.5–4.5)
PLATELET # BLD AUTO: 149 10*3/MM3 (ref 140–500)
PMV BLD AUTO: 11.7 FL (ref 6–12)
POTASSIUM BLD-SCNC: 3.9 MMOL/L (ref 3.5–5.2)
PROCALCITONIN SERPL-MCNC: 0.36 NG/ML (ref 0.1–0.25)
RBC # BLD AUTO: 4.48 10*6/MM3 (ref 4.6–6)
SODIUM BLD-SCNC: 144 MMOL/L (ref 136–145)
TRIGL SERPL-MCNC: 1008 MG/DL (ref 0–150)
UNIT  ABO: NORMAL
UNIT  RH: NORMAL
WBC NRBC COR # BLD: 16.25 10*3/MM3 (ref 4.5–10.7)

## 2017-07-22 PROCEDURE — 80048 BASIC METABOLIC PNL TOTAL CA: CPT | Performed by: INTERNAL MEDICINE

## 2017-07-22 PROCEDURE — 25010000002 PIPERACILLIN SOD-TAZOBACTAM PER 1 G: Performed by: INTERNAL MEDICINE

## 2017-07-22 PROCEDURE — 84100 ASSAY OF PHOSPHORUS: CPT | Performed by: INTERNAL MEDICINE

## 2017-07-22 PROCEDURE — 97530 THERAPEUTIC ACTIVITIES: CPT

## 2017-07-22 PROCEDURE — 85027 COMPLETE CBC AUTOMATED: CPT | Performed by: INTERNAL MEDICINE

## 2017-07-22 PROCEDURE — 94799 UNLISTED PULMONARY SVC/PX: CPT

## 2017-07-22 PROCEDURE — 99232 SBSQ HOSP IP/OBS MODERATE 35: CPT | Performed by: INTERNAL MEDICINE

## 2017-07-22 PROCEDURE — 84478 ASSAY OF TRIGLYCERIDES: CPT | Performed by: INTERNAL MEDICINE

## 2017-07-22 PROCEDURE — 84145 PROCALCITONIN (PCT): CPT | Performed by: INTERNAL MEDICINE

## 2017-07-22 RX ORDER — AMLODIPINE BESYLATE 5 MG/1
5 TABLET ORAL
Status: DISCONTINUED | OUTPATIENT
Start: 2017-07-22 | End: 2017-07-22

## 2017-07-22 RX ORDER — CAPTOPRIL 25 MG/1
25 TABLET ORAL EVERY 8 HOURS SCHEDULED
Status: DISCONTINUED | OUTPATIENT
Start: 2017-07-22 | End: 2017-07-25

## 2017-07-22 RX ORDER — AMLODIPINE BESYLATE 5 MG/1
5 TABLET ORAL ONCE
Status: COMPLETED | OUTPATIENT
Start: 2017-07-22 | End: 2017-07-22

## 2017-07-22 RX ORDER — AMLODIPINE BESYLATE 10 MG/1
10 TABLET ORAL
Status: DISCONTINUED | OUTPATIENT
Start: 2017-07-23 | End: 2017-07-29 | Stop reason: HOSPADM

## 2017-07-22 RX ADMIN — TAZOBACTAM SODIUM AND PIPERACILLIN SODIUM 3.38 G: 375; 3 INJECTION, SOLUTION INTRAVENOUS at 11:05

## 2017-07-22 RX ADMIN — TAZOBACTAM SODIUM AND PIPERACILLIN SODIUM 3.38 G: 375; 3 INJECTION, SOLUTION INTRAVENOUS at 17:20

## 2017-07-22 RX ADMIN — IPRATROPIUM BROMIDE AND ALBUTEROL SULFATE 3 ML: .5; 3 SOLUTION RESPIRATORY (INHALATION) at 12:12

## 2017-07-22 RX ADMIN — AMLODIPINE BESYLATE 5 MG: 5 TABLET ORAL at 12:45

## 2017-07-22 RX ADMIN — AMLODIPINE BESYLATE 5 MG: 5 TABLET ORAL at 18:19

## 2017-07-22 RX ADMIN — METOPROLOL TARTRATE 100 MG: 100 TABLET ORAL at 08:00

## 2017-07-22 RX ADMIN — CLEVIDIPINE 4 MG/HR: 0.5 EMULSION INTRAVENOUS at 04:34

## 2017-07-22 RX ADMIN — IPRATROPIUM BROMIDE AND ALBUTEROL SULFATE 3 ML: .5; 3 SOLUTION RESPIRATORY (INHALATION) at 07:31

## 2017-07-22 RX ADMIN — CAPTOPRIL 25 MG: 25 TABLET ORAL at 13:55

## 2017-07-22 RX ADMIN — NICOTINE 1 PATCH: 21 PATCH, EXTENDED RELEASE TRANSDERMAL at 22:00

## 2017-07-22 RX ADMIN — TAZOBACTAM SODIUM AND PIPERACILLIN SODIUM 3.38 G: 375; 3 INJECTION, SOLUTION INTRAVENOUS at 02:00

## 2017-07-22 RX ADMIN — CAPTOPRIL 25 MG: 25 TABLET ORAL at 22:00

## 2017-07-22 RX ADMIN — IPRATROPIUM BROMIDE AND ALBUTEROL SULFATE 3 ML: .5; 3 SOLUTION RESPIRATORY (INHALATION) at 19:44

## 2017-07-22 RX ADMIN — CAPTOPRIL 12.5 MG: 12.5 TABLET ORAL at 06:00

## 2017-07-22 RX ADMIN — ASPIRIN 81 MG: 81 TABLET ORAL at 08:00

## 2017-07-22 RX ADMIN — PANTOPRAZOLE SODIUM 40 MG: 40 TABLET, DELAYED RELEASE ORAL at 06:00

## 2017-07-22 RX ADMIN — IPRATROPIUM BROMIDE AND ALBUTEROL SULFATE 3 ML: .5; 3 SOLUTION RESPIRATORY (INHALATION) at 23:48

## 2017-07-22 RX ADMIN — METOPROLOL TARTRATE 100 MG: 100 TABLET ORAL at 21:00

## 2017-07-23 LAB
ALBUMIN SERPL-MCNC: 2.3 G/DL (ref 3.5–5.2)
ANION GAP SERPL CALCULATED.3IONS-SCNC: 22.4 MMOL/L
BUN BLD-MCNC: 16 MG/DL (ref 8–23)
BUN/CREAT SERPL: 16.3 (ref 7–25)
CALCIUM SPEC-SCNC: 7.1 MG/DL (ref 8.6–10.5)
CHLORIDE SERPL-SCNC: 104 MMOL/L (ref 98–107)
CO2 SERPL-SCNC: 19.6 MMOL/L (ref 22–29)
CREAT BLD-MCNC: 0.98 MG/DL (ref 0.76–1.27)
DEPRECATED RDW RBC AUTO: 51.1 FL (ref 37–54)
ERYTHROCYTE [DISTWIDTH] IN BLOOD BY AUTOMATED COUNT: 15.1 % (ref 11.5–14.5)
GFR SERPL CREATININE-BSD FRML MDRD: 76 ML/MIN/1.73
GLUCOSE BLD-MCNC: 122 MG/DL (ref 65–99)
HCT VFR BLD AUTO: 39.2 % (ref 40.4–52.2)
HGB BLD-MCNC: 12.3 G/DL (ref 13.7–17.6)
MCH RBC QN AUTO: 29.4 PG (ref 27–32.7)
MCHC RBC AUTO-ENTMCNC: 31.4 G/DL (ref 32.6–36.4)
MCV RBC AUTO: 93.6 FL (ref 79.8–96.2)
PHOSPHATE SERPL-MCNC: 2.2 MG/DL (ref 2.5–4.5)
PLATELET # BLD AUTO: 159 10*3/MM3 (ref 140–500)
PMV BLD AUTO: 11 FL (ref 6–12)
POTASSIUM BLD-SCNC: 3.3 MMOL/L (ref 3.5–5.2)
RBC # BLD AUTO: 4.19 10*6/MM3 (ref 4.6–6)
SODIUM BLD-SCNC: 146 MMOL/L (ref 136–145)
WBC NRBC COR # BLD: 12.13 10*3/MM3 (ref 4.5–10.7)

## 2017-07-23 PROCEDURE — 99232 SBSQ HOSP IP/OBS MODERATE 35: CPT | Performed by: INTERNAL MEDICINE

## 2017-07-23 PROCEDURE — 80069 RENAL FUNCTION PANEL: CPT | Performed by: INTERNAL MEDICINE

## 2017-07-23 PROCEDURE — 25010000002 FUROSEMIDE PER 20 MG: Performed by: INTERNAL MEDICINE

## 2017-07-23 PROCEDURE — 94799 UNLISTED PULMONARY SVC/PX: CPT

## 2017-07-23 PROCEDURE — 97530 THERAPEUTIC ACTIVITIES: CPT

## 2017-07-23 PROCEDURE — 85027 COMPLETE CBC AUTOMATED: CPT | Performed by: INTERNAL MEDICINE

## 2017-07-23 PROCEDURE — 25010000002 PIPERACILLIN SOD-TAZOBACTAM PER 1 G: Performed by: INTERNAL MEDICINE

## 2017-07-23 RX ORDER — POTASSIUM CHLORIDE 750 MG/1
40 CAPSULE, EXTENDED RELEASE ORAL AS NEEDED
Status: DISCONTINUED | OUTPATIENT
Start: 2017-07-23 | End: 2017-07-29 | Stop reason: HOSPADM

## 2017-07-23 RX ORDER — POTASSIUM CHLORIDE 1.5 G/1.77G
40 POWDER, FOR SOLUTION ORAL AS NEEDED
Status: DISCONTINUED | OUTPATIENT
Start: 2017-07-23 | End: 2017-07-29 | Stop reason: HOSPADM

## 2017-07-23 RX ORDER — FUROSEMIDE 10 MG/ML
40 INJECTION INTRAMUSCULAR; INTRAVENOUS ONCE
Status: COMPLETED | OUTPATIENT
Start: 2017-07-23 | End: 2017-07-23

## 2017-07-23 RX ORDER — POLYETHYLENE GLYCOL 3350 17 G/17G
17 POWDER, FOR SOLUTION ORAL DAILY PRN
Status: DISCONTINUED | OUTPATIENT
Start: 2017-07-23 | End: 2017-07-25

## 2017-07-23 RX ADMIN — METOPROLOL TARTRATE 100 MG: 100 TABLET ORAL at 08:18

## 2017-07-23 RX ADMIN — POTASSIUM CHLORIDE 40 MEQ: 750 CAPSULE, EXTENDED RELEASE ORAL at 18:29

## 2017-07-23 RX ADMIN — TAZOBACTAM SODIUM AND PIPERACILLIN SODIUM 3.38 G: 375; 3 INJECTION, SOLUTION INTRAVENOUS at 22:09

## 2017-07-23 RX ADMIN — POTASSIUM CHLORIDE 40 MEQ: 750 CAPSULE, EXTENDED RELEASE ORAL at 14:40

## 2017-07-23 RX ADMIN — METOPROLOL TARTRATE 100 MG: 100 TABLET ORAL at 22:10

## 2017-07-23 RX ADMIN — POTASSIUM PHOSPHATE, MONOBASIC AND POTASSIUM PHOSPHATE, DIBASIC 10 MMOL: 224; 236 INJECTION, SOLUTION INTRAVENOUS at 15:28

## 2017-07-23 RX ADMIN — FUROSEMIDE 40 MG: 10 INJECTION, SOLUTION INTRAMUSCULAR; INTRAVENOUS at 14:40

## 2017-07-23 RX ADMIN — TAZOBACTAM SODIUM AND PIPERACILLIN SODIUM 3.38 G: 375; 3 INJECTION, SOLUTION INTRAVENOUS at 02:00

## 2017-07-23 RX ADMIN — NICOTINE 1 PATCH: 21 PATCH, EXTENDED RELEASE TRANSDERMAL at 22:11

## 2017-07-23 RX ADMIN — CAPTOPRIL 25 MG: 25 TABLET ORAL at 14:40

## 2017-07-23 RX ADMIN — PANTOPRAZOLE SODIUM 40 MG: 40 TABLET, DELAYED RELEASE ORAL at 06:00

## 2017-07-23 RX ADMIN — AMLODIPINE BESYLATE 10 MG: 10 TABLET ORAL at 08:18

## 2017-07-23 RX ADMIN — TAZOBACTAM SODIUM AND PIPERACILLIN SODIUM 3.38 G: 375; 3 INJECTION, SOLUTION INTRAVENOUS at 10:07

## 2017-07-23 RX ADMIN — IPRATROPIUM BROMIDE AND ALBUTEROL SULFATE 3 ML: .5; 3 SOLUTION RESPIRATORY (INHALATION) at 19:14

## 2017-07-23 RX ADMIN — POLYETHYLENE GLYCOL 3350 17 G: 17 POWDER, FOR SOLUTION ORAL at 14:40

## 2017-07-23 RX ADMIN — ASPIRIN 81 MG: 81 TABLET ORAL at 08:18

## 2017-07-23 RX ADMIN — CAPTOPRIL 25 MG: 25 TABLET ORAL at 22:11

## 2017-07-23 RX ADMIN — IPRATROPIUM BROMIDE AND ALBUTEROL SULFATE 3 ML: .5; 3 SOLUTION RESPIRATORY (INHALATION) at 12:16

## 2017-07-23 RX ADMIN — HYDROCODONE BITARTRATE AND ACETAMINOPHEN 1 TABLET: 5; 325 TABLET ORAL at 23:30

## 2017-07-23 RX ADMIN — CAPTOPRIL 25 MG: 25 TABLET ORAL at 06:00

## 2017-07-23 RX ADMIN — ACETAMINOPHEN 650 MG: 325 TABLET ORAL at 23:29

## 2017-07-23 RX ADMIN — IPRATROPIUM BROMIDE AND ALBUTEROL SULFATE 3 ML: .5; 3 SOLUTION RESPIRATORY (INHALATION) at 07:21

## 2017-07-24 LAB
ALBUMIN SERPL-MCNC: 2.6 G/DL (ref 3.5–5.2)
ANION GAP SERPL CALCULATED.3IONS-SCNC: 13.4 MMOL/L
BUN BLD-MCNC: 19 MG/DL (ref 8–23)
BUN/CREAT SERPL: 14.5 (ref 7–25)
CALCIUM SPEC-SCNC: 8.6 MG/DL (ref 8.6–10.5)
CHLORIDE SERPL-SCNC: 106 MMOL/L (ref 98–107)
CO2 SERPL-SCNC: 24.6 MMOL/L (ref 22–29)
CREAT BLD-MCNC: 1.31 MG/DL (ref 0.76–1.27)
GFR SERPL CREATININE-BSD FRML MDRD: 55 ML/MIN/1.73
GLUCOSE BLD-MCNC: 123 MG/DL (ref 65–99)
MAGNESIUM SERPL-MCNC: 2.4 MG/DL (ref 1.6–2.4)
PHOSPHATE SERPL-MCNC: 3.3 MG/DL (ref 2.5–4.5)
POTASSIUM BLD-SCNC: 4.3 MMOL/L (ref 3.5–5.2)
SODIUM BLD-SCNC: 144 MMOL/L (ref 136–145)

## 2017-07-24 PROCEDURE — 97110 THERAPEUTIC EXERCISES: CPT

## 2017-07-24 PROCEDURE — 94799 UNLISTED PULMONARY SVC/PX: CPT

## 2017-07-24 PROCEDURE — 25010000002 PIPERACILLIN SOD-TAZOBACTAM PER 1 G: Performed by: INTERNAL MEDICINE

## 2017-07-24 PROCEDURE — 99232 SBSQ HOSP IP/OBS MODERATE 35: CPT | Performed by: INTERNAL MEDICINE

## 2017-07-24 PROCEDURE — 83735 ASSAY OF MAGNESIUM: CPT | Performed by: INTERNAL MEDICINE

## 2017-07-24 PROCEDURE — 80069 RENAL FUNCTION PANEL: CPT | Performed by: INTERNAL MEDICINE

## 2017-07-24 RX ORDER — BUDESONIDE AND FORMOTEROL FUMARATE DIHYDRATE 160; 4.5 UG/1; UG/1
2 AEROSOL RESPIRATORY (INHALATION)
Status: DISCONTINUED | OUTPATIENT
Start: 2017-07-24 | End: 2017-07-29 | Stop reason: HOSPADM

## 2017-07-24 RX ORDER — AMOXICILLIN AND CLAVULANATE POTASSIUM 875; 125 MG/1; MG/1
1 TABLET, FILM COATED ORAL EVERY 12 HOURS SCHEDULED
Status: DISCONTINUED | OUTPATIENT
Start: 2017-07-24 | End: 2017-07-29 | Stop reason: HOSPADM

## 2017-07-24 RX ADMIN — IPRATROPIUM BROMIDE AND ALBUTEROL SULFATE 3 ML: .5; 3 SOLUTION RESPIRATORY (INHALATION) at 18:51

## 2017-07-24 RX ADMIN — ASPIRIN 81 MG: 81 TABLET ORAL at 08:48

## 2017-07-24 RX ADMIN — IPRATROPIUM BROMIDE AND ALBUTEROL SULFATE 3 ML: .5; 3 SOLUTION RESPIRATORY (INHALATION) at 08:17

## 2017-07-24 RX ADMIN — CAPTOPRIL 25 MG: 25 TABLET ORAL at 15:13

## 2017-07-24 RX ADMIN — CAPTOPRIL 25 MG: 25 TABLET ORAL at 21:02

## 2017-07-24 RX ADMIN — AMLODIPINE BESYLATE 10 MG: 10 TABLET ORAL at 08:47

## 2017-07-24 RX ADMIN — METOPROLOL TARTRATE 100 MG: 100 TABLET ORAL at 08:47

## 2017-07-24 RX ADMIN — TAZOBACTAM SODIUM AND PIPERACILLIN SODIUM 3.38 G: 375; 3 INJECTION, SOLUTION INTRAVENOUS at 15:13

## 2017-07-24 RX ADMIN — IPRATROPIUM BROMIDE AND ALBUTEROL SULFATE 3 ML: .5; 3 SOLUTION RESPIRATORY (INHALATION) at 12:15

## 2017-07-24 RX ADMIN — AMOXICILLIN AND CLAVULANATE POTASSIUM 1 TABLET: 875; 125 TABLET, FILM COATED ORAL at 21:02

## 2017-07-24 RX ADMIN — IPRATROPIUM BROMIDE AND ALBUTEROL SULFATE 3 ML: .5; 3 SOLUTION RESPIRATORY (INHALATION) at 00:08

## 2017-07-24 RX ADMIN — CAPTOPRIL 25 MG: 25 TABLET ORAL at 05:48

## 2017-07-24 RX ADMIN — TAZOBACTAM SODIUM AND PIPERACILLIN SODIUM 3.38 G: 375; 3 INJECTION, SOLUTION INTRAVENOUS at 05:48

## 2017-07-24 RX ADMIN — METOPROLOL TARTRATE 100 MG: 100 TABLET ORAL at 21:03

## 2017-07-25 ENCOUNTER — APPOINTMENT (OUTPATIENT)
Dept: NUCLEAR MEDICINE | Facility: HOSPITAL | Age: 67
End: 2017-07-25

## 2017-07-25 ENCOUNTER — APPOINTMENT (OUTPATIENT)
Dept: CT IMAGING | Facility: HOSPITAL | Age: 67
End: 2017-07-25

## 2017-07-25 LAB
ALBUMIN SERPL-MCNC: 2.7 G/DL (ref 3.5–5.2)
ANION GAP SERPL CALCULATED.3IONS-SCNC: 11.9 MMOL/L
BACTERIA SPEC AEROBE CULT: NORMAL
BASOPHILS # BLD AUTO: 0.03 10*3/MM3 (ref 0–0.2)
BASOPHILS NFR BLD AUTO: 0.2 % (ref 0–1.5)
BH CV STRESS COMMENTS STAGE 1: NORMAL
BH CV STRESS DOSE REGADENOSON STAGE 1: 0.4
BH CV STRESS DURATION MIN STAGE 1: 0
BH CV STRESS DURATION SEC STAGE 1: 15
BH CV STRESS PROTOCOL 1: NORMAL
BH CV STRESS RECOVERY BP: NORMAL MMHG
BH CV STRESS RECOVERY HR: 83 BPM
BH CV STRESS STAGE 1: 1
BUN BLD-MCNC: 22 MG/DL (ref 8–23)
BUN/CREAT SERPL: 18 (ref 7–25)
CALCIUM SPEC-SCNC: 8.5 MG/DL (ref 8.6–10.5)
CHLORIDE SERPL-SCNC: 105 MMOL/L (ref 98–107)
CO2 SERPL-SCNC: 26.1 MMOL/L (ref 22–29)
CREAT BLD-MCNC: 1.22 MG/DL (ref 0.76–1.27)
DEPRECATED RDW RBC AUTO: 52.4 FL (ref 37–54)
EOSINOPHIL # BLD AUTO: 0.33 10*3/MM3 (ref 0–0.7)
EOSINOPHIL NFR BLD AUTO: 2.7 % (ref 0.3–6.2)
ERYTHROCYTE [DISTWIDTH] IN BLOOD BY AUTOMATED COUNT: 15 % (ref 11.5–14.5)
GFR SERPL CREATININE-BSD FRML MDRD: 59 ML/MIN/1.73
GLUCOSE BLD-MCNC: 107 MG/DL (ref 65–99)
HCT VFR BLD AUTO: 40.4 % (ref 40.4–52.2)
HGB BLD-MCNC: 12.8 G/DL (ref 13.7–17.6)
IMM GRANULOCYTES # BLD: 0.07 10*3/MM3 (ref 0–0.03)
IMM GRANULOCYTES NFR BLD: 0.6 % (ref 0–0.5)
LV EF NUC BP: 48 %
LYMPHOCYTES # BLD AUTO: 0.91 10*3/MM3 (ref 0.9–4.8)
LYMPHOCYTES NFR BLD AUTO: 7.4 % (ref 19.6–45.3)
MAGNESIUM SERPL-MCNC: 2.3 MG/DL (ref 1.6–2.4)
MAXIMAL PREDICTED HEART RATE: 153 BPM
MCH RBC QN AUTO: 30.3 PG (ref 27–32.7)
MCHC RBC AUTO-ENTMCNC: 31.7 G/DL (ref 32.6–36.4)
MCV RBC AUTO: 95.5 FL (ref 79.8–96.2)
MONOCYTES # BLD AUTO: 1.88 10*3/MM3 (ref 0.2–1.2)
MONOCYTES NFR BLD AUTO: 15.3 % (ref 5–12)
NEUTROPHILS # BLD AUTO: 9.1 10*3/MM3 (ref 1.9–8.1)
NEUTROPHILS NFR BLD AUTO: 73.8 % (ref 42.7–76)
PERCENT MAX PREDICTED HR: 58.82 %
PHOSPHATE SERPL-MCNC: 3.8 MG/DL (ref 2.5–4.5)
PLATELET # BLD AUTO: 206 10*3/MM3 (ref 140–500)
PMV BLD AUTO: 11.2 FL (ref 6–12)
POTASSIUM BLD-SCNC: 4.2 MMOL/L (ref 3.5–5.2)
RBC # BLD AUTO: 4.23 10*6/MM3 (ref 4.6–6)
SODIUM BLD-SCNC: 143 MMOL/L (ref 136–145)
STRESS BASELINE BP: NORMAL MMHG
STRESS BASELINE HR: 73 BPM
STRESS PERCENT HR: 69 %
STRESS POST PEAK BP: NORMAL MMHG
STRESS POST PEAK HR: 90 BPM
STRESS TARGET HR: 130 BPM
URATE SERPL-MCNC: 2.9 MG/DL (ref 3.4–7)
WBC NRBC COR # BLD: 12.32 10*3/MM3 (ref 4.5–10.7)

## 2017-07-25 PROCEDURE — 93017 CV STRESS TEST TRACING ONLY: CPT

## 2017-07-25 PROCEDURE — 0 TECHNETIUM SESTAMIBI: Performed by: SURGERY

## 2017-07-25 PROCEDURE — 78452 HT MUSCLE IMAGE SPECT MULT: CPT | Performed by: INTERNAL MEDICINE

## 2017-07-25 PROCEDURE — A9500 TC99M SESTAMIBI: HCPCS | Performed by: SURGERY

## 2017-07-25 PROCEDURE — 74174 CTA ABD&PLVS W/CONTRAST: CPT

## 2017-07-25 PROCEDURE — 80069 RENAL FUNCTION PANEL: CPT | Performed by: INTERNAL MEDICINE

## 2017-07-25 PROCEDURE — 99232 SBSQ HOSP IP/OBS MODERATE 35: CPT | Performed by: INTERNAL MEDICINE

## 2017-07-25 PROCEDURE — 25010000002 REGADENOSON 0.4 MG/5ML SOLUTION: Performed by: SURGERY

## 2017-07-25 PROCEDURE — 78452 HT MUSCLE IMAGE SPECT MULT: CPT

## 2017-07-25 PROCEDURE — 0 TECHNETIUM SESTAMIBI: Performed by: INTERNAL MEDICINE

## 2017-07-25 PROCEDURE — 83735 ASSAY OF MAGNESIUM: CPT | Performed by: INTERNAL MEDICINE

## 2017-07-25 PROCEDURE — A9500 TC99M SESTAMIBI: HCPCS | Performed by: INTERNAL MEDICINE

## 2017-07-25 PROCEDURE — 93016 CV STRESS TEST SUPVJ ONLY: CPT | Performed by: INTERNAL MEDICINE

## 2017-07-25 PROCEDURE — 94799 UNLISTED PULMONARY SVC/PX: CPT

## 2017-07-25 PROCEDURE — 0 IOPAMIDOL 61 % SOLUTION: Performed by: SURGERY

## 2017-07-25 PROCEDURE — 85025 COMPLETE CBC W/AUTO DIFF WBC: CPT | Performed by: INTERNAL MEDICINE

## 2017-07-25 PROCEDURE — 93018 CV STRESS TEST I&R ONLY: CPT | Performed by: INTERNAL MEDICINE

## 2017-07-25 PROCEDURE — 84550 ASSAY OF BLOOD/URIC ACID: CPT | Performed by: INTERNAL MEDICINE

## 2017-07-25 RX ADMIN — BUDESONIDE AND FORMOTEROL FUMARATE DIHYDRATE 2 PUFF: 160; 4.5 AEROSOL RESPIRATORY (INHALATION) at 08:39

## 2017-07-25 RX ADMIN — TECHNETIUM TC-99M SESTAMIBI 1 DOSE: 1 INJECTION INTRAVENOUS at 08:55

## 2017-07-25 RX ADMIN — METOPROLOL TARTRATE 150 MG: 100 TABLET ORAL at 22:25

## 2017-07-25 RX ADMIN — CAPTOPRIL 25 MG: 25 TABLET ORAL at 06:26

## 2017-07-25 RX ADMIN — BUDESONIDE AND FORMOTEROL FUMARATE DIHYDRATE 2 PUFF: 160; 4.5 AEROSOL RESPIRATORY (INHALATION) at 19:23

## 2017-07-25 RX ADMIN — TECHNETIUM TC-99M SESTAMIBI 1 DOSE: 1 INJECTION INTRAVENOUS at 12:45

## 2017-07-25 RX ADMIN — AMOXICILLIN AND CLAVULANATE POTASSIUM 1 TABLET: 875; 125 TABLET, FILM COATED ORAL at 13:48

## 2017-07-25 RX ADMIN — AMOXICILLIN AND CLAVULANATE POTASSIUM 1 TABLET: 875; 125 TABLET, FILM COATED ORAL at 22:25

## 2017-07-25 RX ADMIN — IPRATROPIUM BROMIDE AND ALBUTEROL SULFATE 3 ML: .5; 3 SOLUTION RESPIRATORY (INHALATION) at 19:23

## 2017-07-25 RX ADMIN — IOPAMIDOL 95 ML: 612 INJECTION, SOLUTION INTRAVENOUS at 17:45

## 2017-07-25 RX ADMIN — CAPTOPRIL 25 MG: 25 TABLET ORAL at 13:49

## 2017-07-25 RX ADMIN — IPRATROPIUM BROMIDE AND ALBUTEROL SULFATE 3 ML: .5; 3 SOLUTION RESPIRATORY (INHALATION) at 08:38

## 2017-07-25 RX ADMIN — ASPIRIN 81 MG: 81 TABLET ORAL at 13:49

## 2017-07-25 RX ADMIN — AMLODIPINE BESYLATE 10 MG: 10 TABLET ORAL at 13:49

## 2017-07-25 RX ADMIN — NICOTINE 1 PATCH: 21 PATCH, EXTENDED RELEASE TRANSDERMAL at 00:35

## 2017-07-25 RX ADMIN — NICOTINE 1 PATCH: 21 PATCH, EXTENDED RELEASE TRANSDERMAL at 22:27

## 2017-07-25 RX ADMIN — REGADENOSON 0.4 MG: 0.08 INJECTION, SOLUTION INTRAVENOUS at 12:45

## 2017-07-25 RX ADMIN — IPRATROPIUM BROMIDE AND ALBUTEROL SULFATE 3 ML: .5; 3 SOLUTION RESPIRATORY (INHALATION) at 00:07

## 2017-07-25 RX ADMIN — IPRATROPIUM BROMIDE AND ALBUTEROL SULFATE 3 ML: .5; 3 SOLUTION RESPIRATORY (INHALATION) at 12:31

## 2017-07-26 ENCOUNTER — APPOINTMENT (OUTPATIENT)
Dept: GENERAL RADIOLOGY | Facility: HOSPITAL | Age: 67
End: 2017-07-26
Attending: SURGERY

## 2017-07-26 ENCOUNTER — APPOINTMENT (OUTPATIENT)
Dept: CARDIOLOGY | Facility: HOSPITAL | Age: 67
End: 2017-07-26
Attending: SURGERY

## 2017-07-26 ENCOUNTER — TELEPHONE (OUTPATIENT)
Dept: CARDIOLOGY | Facility: CLINIC | Age: 67
End: 2017-07-26

## 2017-07-26 LAB
ALBUMIN SERPL-MCNC: 2.6 G/DL (ref 3.5–5.2)
ALBUMIN/GLOB SERPL: 0.7 G/DL
ALP SERPL-CCNC: 149 U/L (ref 39–117)
ALT SERPL W P-5'-P-CCNC: 32 U/L (ref 1–41)
ANION GAP SERPL CALCULATED.3IONS-SCNC: 12.4 MMOL/L
AST SERPL-CCNC: 37 U/L (ref 1–40)
BH CV LOWER ARTERIAL LEFT ABI RATIO: 0.98
BH CV LOWER ARTERIAL LEFT DORSALIS PEDIS SYS MAX: 155 MMHG
BH CV LOWER ARTERIAL LEFT GREAT TOE SYS MAX: 109 MMHG
BH CV LOWER ARTERIAL LEFT POST TIBIAL SYS MAX: 160 MMHG
BH CV LOWER ARTERIAL LEFT TBI RATIO: 0.66
BH CV LOWER ARTERIAL RIGHT ABI RATIO: 0.95
BH CV LOWER ARTERIAL RIGHT DORSALIS PEDIS SYS MAX: 156 MMHG
BH CV LOWER ARTERIAL RIGHT GREAT TOE SYS MAX: 128 MMHG
BH CV LOWER ARTERIAL RIGHT POST TIBIAL SYS MAX: 153 MMHG
BH CV LOWER ARTERIAL RIGHT TBI RATIO: 0.78
BILIRUB SERPL-MCNC: 2 MG/DL (ref 0.1–1.2)
BUN BLD-MCNC: 21 MG/DL (ref 8–23)
BUN/CREAT SERPL: 19.6 (ref 7–25)
CALCIUM SPEC-SCNC: 8.3 MG/DL (ref 8.6–10.5)
CHLORIDE SERPL-SCNC: 107 MMOL/L (ref 98–107)
CO2 SERPL-SCNC: 24.6 MMOL/L (ref 22–29)
CREAT BLD-MCNC: 1.07 MG/DL (ref 0.76–1.27)
GFR SERPL CREATININE-BSD FRML MDRD: 69 ML/MIN/1.73
GLOBULIN UR ELPH-MCNC: 3.7 GM/DL
GLUCOSE BLD-MCNC: 114 MG/DL (ref 65–99)
POTASSIUM BLD-SCNC: 4.3 MMOL/L (ref 3.5–5.2)
PROT SERPL-MCNC: 6.3 G/DL (ref 6–8.5)
SODIUM BLD-SCNC: 144 MMOL/L (ref 136–145)
UPPER ARTERIAL LEFT ARM BRACHIAL SYS MAX: 157 MMHG
UPPER ARTERIAL RIGHT ARM BRACHIAL SYS MAX: 164 MMHG

## 2017-07-26 PROCEDURE — 80053 COMPREHEN METABOLIC PANEL: CPT | Performed by: INTERNAL MEDICINE

## 2017-07-26 PROCEDURE — 93922 UPR/L XTREMITY ART 2 LEVELS: CPT

## 2017-07-26 PROCEDURE — 94799 UNLISTED PULMONARY SVC/PX: CPT

## 2017-07-26 PROCEDURE — 71020 HC CHEST PA AND LATERAL: CPT

## 2017-07-26 PROCEDURE — 99233 SBSQ HOSP IP/OBS HIGH 50: CPT | Performed by: INTERNAL MEDICINE

## 2017-07-26 PROCEDURE — 25010000002 FUROSEMIDE PER 20 MG: Performed by: INTERNAL MEDICINE

## 2017-07-26 RX ORDER — FUROSEMIDE 10 MG/ML
40 INJECTION INTRAMUSCULAR; INTRAVENOUS ONCE
Status: COMPLETED | OUTPATIENT
Start: 2017-07-26 | End: 2017-07-26

## 2017-07-26 RX ORDER — SODIUM CHLORIDE 9 MG/ML
125 INJECTION, SOLUTION INTRAVENOUS CONTINUOUS
Status: ACTIVE | OUTPATIENT
Start: 2017-07-26 | End: 2017-07-26

## 2017-07-26 RX ADMIN — AMLODIPINE BESYLATE 10 MG: 10 TABLET ORAL at 10:49

## 2017-07-26 RX ADMIN — IPRATROPIUM BROMIDE AND ALBUTEROL SULFATE 3 ML: .5; 3 SOLUTION RESPIRATORY (INHALATION) at 20:03

## 2017-07-26 RX ADMIN — ASPIRIN 81 MG: 81 TABLET ORAL at 10:49

## 2017-07-26 RX ADMIN — AMOXICILLIN AND CLAVULANATE POTASSIUM 1 TABLET: 875; 125 TABLET, FILM COATED ORAL at 21:02

## 2017-07-26 RX ADMIN — IPRATROPIUM BROMIDE AND ALBUTEROL SULFATE 3 ML: .5; 3 SOLUTION RESPIRATORY (INHALATION) at 00:09

## 2017-07-26 RX ADMIN — METOPROLOL TARTRATE 150 MG: 100 TABLET ORAL at 21:02

## 2017-07-26 RX ADMIN — SODIUM CHLORIDE 125 ML/HR: 9 INJECTION, SOLUTION INTRAVENOUS at 06:37

## 2017-07-26 RX ADMIN — HYDROCODONE BITARTRATE AND ACETAMINOPHEN 1 TABLET: 5; 325 TABLET ORAL at 21:16

## 2017-07-26 RX ADMIN — METOPROLOL TARTRATE 150 MG: 100 TABLET ORAL at 10:50

## 2017-07-26 RX ADMIN — FUROSEMIDE 40 MG: 10 INJECTION, SOLUTION INTRAMUSCULAR; INTRAVENOUS at 18:15

## 2017-07-26 RX ADMIN — BUDESONIDE AND FORMOTEROL FUMARATE DIHYDRATE 2 PUFF: 160; 4.5 AEROSOL RESPIRATORY (INHALATION) at 20:03

## 2017-07-26 RX ADMIN — AMOXICILLIN AND CLAVULANATE POTASSIUM 1 TABLET: 875; 125 TABLET, FILM COATED ORAL at 10:49

## 2017-07-26 RX ADMIN — NICOTINE 1 PATCH: 21 PATCH, EXTENDED RELEASE TRANSDERMAL at 21:03

## 2017-07-27 LAB
ANION GAP SERPL CALCULATED.3IONS-SCNC: 12.3 MMOL/L
BUN BLD-MCNC: 24 MG/DL (ref 8–23)
BUN/CREAT SERPL: 19.8 (ref 7–25)
CALCIUM SPEC-SCNC: 8.3 MG/DL (ref 8.6–10.5)
CHLORIDE SERPL-SCNC: 103 MMOL/L (ref 98–107)
CO2 SERPL-SCNC: 28.7 MMOL/L (ref 22–29)
CREAT BLD-MCNC: 1.21 MG/DL (ref 0.76–1.27)
DEPRECATED RDW RBC AUTO: 53.5 FL (ref 37–54)
ERYTHROCYTE [DISTWIDTH] IN BLOOD BY AUTOMATED COUNT: 15.1 % (ref 11.5–14.5)
GFR SERPL CREATININE-BSD FRML MDRD: 60 ML/MIN/1.73
GLUCOSE BLD-MCNC: 105 MG/DL (ref 65–99)
HCT VFR BLD AUTO: 40.3 % (ref 40.4–52.2)
HGB BLD-MCNC: 12.6 G/DL (ref 13.7–17.6)
MCH RBC QN AUTO: 30.4 PG (ref 27–32.7)
MCHC RBC AUTO-ENTMCNC: 31.3 G/DL (ref 32.6–36.4)
MCV RBC AUTO: 97.1 FL (ref 79.8–96.2)
PLATELET # BLD AUTO: 263 10*3/MM3 (ref 140–500)
PMV BLD AUTO: 11 FL (ref 6–12)
POTASSIUM BLD-SCNC: 4.2 MMOL/L (ref 3.5–5.2)
RBC # BLD AUTO: 4.15 10*6/MM3 (ref 4.6–6)
SODIUM BLD-SCNC: 144 MMOL/L (ref 136–145)
WBC NRBC COR # BLD: 10.47 10*3/MM3 (ref 4.5–10.7)

## 2017-07-27 PROCEDURE — C1769 GUIDE WIRE: HCPCS | Performed by: INTERNAL MEDICINE

## 2017-07-27 PROCEDURE — C1725 CATH, TRANSLUMIN NON-LASER: HCPCS | Performed by: INTERNAL MEDICINE

## 2017-07-27 PROCEDURE — 0 IOPAMIDOL PER 1 ML: Performed by: INTERNAL MEDICINE

## 2017-07-27 PROCEDURE — 94799 UNLISTED PULMONARY SVC/PX: CPT

## 2017-07-27 PROCEDURE — 99152 MOD SED SAME PHYS/QHP 5/>YRS: CPT | Performed by: INTERNAL MEDICINE

## 2017-07-27 PROCEDURE — 25010000002 FENTANYL CITRATE (PF) 100 MCG/2ML SOLUTION: Performed by: INTERNAL MEDICINE

## 2017-07-27 PROCEDURE — 25010000002 HEPARIN (PORCINE) PER 1000 UNITS: Performed by: INTERNAL MEDICINE

## 2017-07-27 PROCEDURE — 99153 MOD SED SAME PHYS/QHP EA: CPT | Performed by: INTERNAL MEDICINE

## 2017-07-27 PROCEDURE — 80048 BASIC METABOLIC PNL TOTAL CA: CPT | Performed by: SURGERY

## 2017-07-27 PROCEDURE — 93458 L HRT ARTERY/VENTRICLE ANGIO: CPT | Performed by: INTERNAL MEDICINE

## 2017-07-27 PROCEDURE — 85027 COMPLETE CBC AUTOMATED: CPT | Performed by: SURGERY

## 2017-07-27 PROCEDURE — 92920 PRQ TRLUML C ANGIOP 1ART&/BR: CPT | Performed by: INTERNAL MEDICINE

## 2017-07-27 PROCEDURE — 85347 COAGULATION TIME ACTIVATED: CPT

## 2017-07-27 PROCEDURE — C1887 CATHETER, GUIDING: HCPCS | Performed by: INTERNAL MEDICINE

## 2017-07-27 PROCEDURE — 25010000002 MIDAZOLAM PER 1 MG: Performed by: INTERNAL MEDICINE

## 2017-07-27 PROCEDURE — C1894 INTRO/SHEATH, NON-LASER: HCPCS | Performed by: INTERNAL MEDICINE

## 2017-07-27 PROCEDURE — 25010000002 PROTAMINE SULFATE PER 10 MG: Performed by: INTERNAL MEDICINE

## 2017-07-27 RX ORDER — HEPARIN SODIUM 1000 [USP'U]/ML
INJECTION, SOLUTION INTRAVENOUS; SUBCUTANEOUS AS NEEDED
Status: DISCONTINUED | OUTPATIENT
Start: 2017-07-27 | End: 2017-07-27 | Stop reason: HOSPADM

## 2017-07-27 RX ORDER — SODIUM CHLORIDE 9 MG/ML
100 INJECTION, SOLUTION INTRAVENOUS CONTINUOUS
Status: DISCONTINUED | OUTPATIENT
Start: 2017-07-27 | End: 2017-07-27

## 2017-07-27 RX ORDER — FENTANYL CITRATE 50 UG/ML
INJECTION, SOLUTION INTRAMUSCULAR; INTRAVENOUS AS NEEDED
Status: DISCONTINUED | OUTPATIENT
Start: 2017-07-27 | End: 2017-07-27 | Stop reason: HOSPADM

## 2017-07-27 RX ORDER — SODIUM CHLORIDE 9 MG/ML
75 INJECTION, SOLUTION INTRAVENOUS CONTINUOUS
Status: DISCONTINUED | OUTPATIENT
Start: 2017-07-27 | End: 2017-07-29 | Stop reason: HOSPADM

## 2017-07-27 RX ORDER — PROTAMINE SULFATE 10 MG/ML
INJECTION, SOLUTION INTRAVENOUS AS NEEDED
Status: DISCONTINUED | OUTPATIENT
Start: 2017-07-27 | End: 2017-07-27 | Stop reason: HOSPADM

## 2017-07-27 RX ORDER — MIDAZOLAM HYDROCHLORIDE 1 MG/ML
INJECTION INTRAMUSCULAR; INTRAVENOUS AS NEEDED
Status: DISCONTINUED | OUTPATIENT
Start: 2017-07-27 | End: 2017-07-27 | Stop reason: HOSPADM

## 2017-07-27 RX ORDER — LIDOCAINE HYDROCHLORIDE 20 MG/ML
INJECTION, SOLUTION INFILTRATION; PERINEURAL AS NEEDED
Status: DISCONTINUED | OUTPATIENT
Start: 2017-07-27 | End: 2017-07-27 | Stop reason: HOSPADM

## 2017-07-27 RX ADMIN — BUDESONIDE AND FORMOTEROL FUMARATE DIHYDRATE 2 PUFF: 160; 4.5 AEROSOL RESPIRATORY (INHALATION) at 07:56

## 2017-07-27 RX ADMIN — METOPROLOL TARTRATE 150 MG: 100 TABLET ORAL at 22:56

## 2017-07-27 RX ADMIN — ASPIRIN 81 MG: 81 TABLET ORAL at 08:22

## 2017-07-27 RX ADMIN — IPRATROPIUM BROMIDE AND ALBUTEROL SULFATE 3 ML: .5; 3 SOLUTION RESPIRATORY (INHALATION) at 13:51

## 2017-07-27 RX ADMIN — AMOXICILLIN AND CLAVULANATE POTASSIUM 1 TABLET: 875; 125 TABLET, FILM COATED ORAL at 08:22

## 2017-07-27 RX ADMIN — HYDROCODONE BITARTRATE AND ACETAMINOPHEN 1 TABLET: 5; 325 TABLET ORAL at 08:22

## 2017-07-27 RX ADMIN — IPRATROPIUM BROMIDE AND ALBUTEROL SULFATE 3 ML: .5; 3 SOLUTION RESPIRATORY (INHALATION) at 19:36

## 2017-07-27 RX ADMIN — METOPROLOL TARTRATE 150 MG: 100 TABLET ORAL at 08:22

## 2017-07-27 RX ADMIN — HYDROCODONE BITARTRATE AND ACETAMINOPHEN 1 TABLET: 5; 325 TABLET ORAL at 21:03

## 2017-07-27 RX ADMIN — AMLODIPINE BESYLATE 10 MG: 10 TABLET ORAL at 08:22

## 2017-07-27 RX ADMIN — IPRATROPIUM BROMIDE AND ALBUTEROL SULFATE 3 ML: .5; 3 SOLUTION RESPIRATORY (INHALATION) at 07:56

## 2017-07-27 RX ADMIN — NICOTINE 1 PATCH: 21 PATCH, EXTENDED RELEASE TRANSDERMAL at 22:56

## 2017-07-27 RX ADMIN — AMOXICILLIN AND CLAVULANATE POTASSIUM 1 TABLET: 875; 125 TABLET, FILM COATED ORAL at 22:57

## 2017-07-27 RX ADMIN — SODIUM CHLORIDE 75 ML/HR: 9 INJECTION, SOLUTION INTRAVENOUS at 06:35

## 2017-07-28 DIAGNOSIS — I25.10 CORONARY ARTERY DISEASE INVOLVING NATIVE CORONARY ARTERY OF NATIVE HEART WITHOUT ANGINA PECTORIS: Primary | ICD-10-CM

## 2017-07-28 PROBLEM — J42 CHRONIC BRONCHITIS: Chronic | Status: ACTIVE | Noted: 2017-07-28

## 2017-07-28 LAB
ACT BLD: 252 SECONDS (ref 82–152)
ANION GAP SERPL CALCULATED.3IONS-SCNC: 10.5 MMOL/L
BUN BLD-MCNC: 26 MG/DL (ref 8–23)
BUN/CREAT SERPL: 22 (ref 7–25)
CALCIUM SPEC-SCNC: 8.4 MG/DL (ref 8.6–10.5)
CHLORIDE SERPL-SCNC: 107 MMOL/L (ref 98–107)
CO2 SERPL-SCNC: 25.5 MMOL/L (ref 22–29)
CREAT BLD-MCNC: 1.18 MG/DL (ref 0.76–1.27)
DEPRECATED RDW RBC AUTO: 54.8 FL (ref 37–54)
ERYTHROCYTE [DISTWIDTH] IN BLOOD BY AUTOMATED COUNT: 15.2 % (ref 11.5–14.5)
GFR SERPL CREATININE-BSD FRML MDRD: 62 ML/MIN/1.73
GLUCOSE BLD-MCNC: 123 MG/DL (ref 65–99)
HCT VFR BLD AUTO: 40.8 % (ref 40.4–52.2)
HGB BLD-MCNC: 12.7 G/DL (ref 13.7–17.6)
MCH RBC QN AUTO: 30.8 PG (ref 27–32.7)
MCHC RBC AUTO-ENTMCNC: 31.1 G/DL (ref 32.6–36.4)
MCV RBC AUTO: 98.8 FL (ref 79.8–96.2)
PLATELET # BLD AUTO: 265 10*3/MM3 (ref 140–500)
PMV BLD AUTO: 11.2 FL (ref 6–12)
POTASSIUM BLD-SCNC: 4.1 MMOL/L (ref 3.5–5.2)
RBC # BLD AUTO: 4.13 10*6/MM3 (ref 4.6–6)
SODIUM BLD-SCNC: 143 MMOL/L (ref 136–145)
WBC NRBC COR # BLD: 9.97 10*3/MM3 (ref 4.5–10.7)

## 2017-07-28 PROCEDURE — 94799 UNLISTED PULMONARY SVC/PX: CPT

## 2017-07-28 PROCEDURE — 99232 SBSQ HOSP IP/OBS MODERATE 35: CPT | Performed by: INTERNAL MEDICINE

## 2017-07-28 PROCEDURE — 85027 COMPLETE CBC AUTOMATED: CPT | Performed by: SURGERY

## 2017-07-28 PROCEDURE — 80048 BASIC METABOLIC PNL TOTAL CA: CPT | Performed by: SURGERY

## 2017-07-28 RX ORDER — BUDESONIDE AND FORMOTEROL FUMARATE DIHYDRATE 160; 4.5 UG/1; UG/1
2 AEROSOL RESPIRATORY (INHALATION) 2 TIMES DAILY
Qty: 1 INHALER | Refills: 0 | Status: SHIPPED | OUTPATIENT
Start: 2017-07-28 | End: 2017-08-08 | Stop reason: SDUPTHER

## 2017-07-28 RX ORDER — ALBUTEROL SULFATE 90 UG/1
2 AEROSOL, METERED RESPIRATORY (INHALATION) EVERY 4 HOURS PRN
Qty: 6.7 G | Refills: 0 | Status: SHIPPED | OUTPATIENT
Start: 2017-07-28 | End: 2017-08-08

## 2017-07-28 RX ADMIN — AMOXICILLIN AND CLAVULANATE POTASSIUM 1 TABLET: 875; 125 TABLET, FILM COATED ORAL at 21:31

## 2017-07-28 RX ADMIN — ASPIRIN 81 MG: 81 TABLET ORAL at 08:32

## 2017-07-28 RX ADMIN — IPRATROPIUM BROMIDE AND ALBUTEROL SULFATE 3 ML: .5; 3 SOLUTION RESPIRATORY (INHALATION) at 13:51

## 2017-07-28 RX ADMIN — IPRATROPIUM BROMIDE AND ALBUTEROL SULFATE 3 ML: .5; 3 SOLUTION RESPIRATORY (INHALATION) at 19:29

## 2017-07-28 RX ADMIN — AMLODIPINE BESYLATE 10 MG: 10 TABLET ORAL at 08:32

## 2017-07-28 RX ADMIN — IPRATROPIUM BROMIDE AND ALBUTEROL SULFATE 3 ML: .5; 3 SOLUTION RESPIRATORY (INHALATION) at 07:38

## 2017-07-28 RX ADMIN — BUDESONIDE AND FORMOTEROL FUMARATE DIHYDRATE 2 PUFF: 160; 4.5 AEROSOL RESPIRATORY (INHALATION) at 19:29

## 2017-07-28 RX ADMIN — NICOTINE 1 PATCH: 21 PATCH, EXTENDED RELEASE TRANSDERMAL at 21:33

## 2017-07-28 RX ADMIN — BUDESONIDE AND FORMOTEROL FUMARATE DIHYDRATE 2 PUFF: 160; 4.5 AEROSOL RESPIRATORY (INHALATION) at 07:38

## 2017-07-28 RX ADMIN — METOPROLOL TARTRATE 150 MG: 100 TABLET ORAL at 08:32

## 2017-07-28 RX ADMIN — METOPROLOL TARTRATE 150 MG: 100 TABLET ORAL at 21:31

## 2017-07-28 RX ADMIN — AMOXICILLIN AND CLAVULANATE POTASSIUM 1 TABLET: 875; 125 TABLET, FILM COATED ORAL at 08:32

## 2017-07-29 VITALS
BODY MASS INDEX: 25.06 KG/M2 | OXYGEN SATURATION: 90 % | WEIGHT: 165.34 LBS | HEIGHT: 68 IN | HEART RATE: 51 BPM | DIASTOLIC BLOOD PRESSURE: 63 MMHG | SYSTOLIC BLOOD PRESSURE: 134 MMHG | RESPIRATION RATE: 16 BRPM | TEMPERATURE: 98 F

## 2017-07-29 LAB
ALBUMIN SERPL-MCNC: 2.6 G/DL (ref 3.5–5.2)
ANION GAP SERPL CALCULATED.3IONS-SCNC: 9.7 MMOL/L
BASOPHILS # BLD AUTO: 0.03 10*3/MM3 (ref 0–0.2)
BASOPHILS NFR BLD AUTO: 0.3 % (ref 0–1.5)
BUN BLD-MCNC: 25 MG/DL (ref 8–23)
BUN/CREAT SERPL: 20.8 (ref 7–25)
CALCIUM SPEC-SCNC: 8.7 MG/DL (ref 8.6–10.5)
CHLORIDE SERPL-SCNC: 107 MMOL/L (ref 98–107)
CO2 SERPL-SCNC: 26.3 MMOL/L (ref 22–29)
CREAT BLD-MCNC: 1.2 MG/DL (ref 0.76–1.27)
DEPRECATED RDW RBC AUTO: 52.5 FL (ref 37–54)
EOSINOPHIL # BLD AUTO: 0.42 10*3/MM3 (ref 0–0.7)
EOSINOPHIL NFR BLD AUTO: 4 % (ref 0.3–6.2)
ERYTHROCYTE [DISTWIDTH] IN BLOOD BY AUTOMATED COUNT: 14.9 % (ref 11.5–14.5)
GFR SERPL CREATININE-BSD FRML MDRD: 60 ML/MIN/1.73
GLUCOSE BLD-MCNC: 103 MG/DL (ref 65–99)
HCT VFR BLD AUTO: 39.8 % (ref 40.4–52.2)
HGB BLD-MCNC: 12.5 G/DL (ref 13.7–17.6)
IMM GRANULOCYTES # BLD: 0.05 10*3/MM3 (ref 0–0.03)
IMM GRANULOCYTES NFR BLD: 0.5 % (ref 0–0.5)
LYMPHOCYTES # BLD AUTO: 0.91 10*3/MM3 (ref 0.9–4.8)
LYMPHOCYTES NFR BLD AUTO: 8.8 % (ref 19.6–45.3)
MAGNESIUM SERPL-MCNC: 2.4 MG/DL (ref 1.6–2.4)
MCH RBC QN AUTO: 30.4 PG (ref 27–32.7)
MCHC RBC AUTO-ENTMCNC: 31.4 G/DL (ref 32.6–36.4)
MCV RBC AUTO: 96.8 FL (ref 79.8–96.2)
MONOCYTES # BLD AUTO: 1.07 10*3/MM3 (ref 0.2–1.2)
MONOCYTES NFR BLD AUTO: 10.3 % (ref 5–12)
NEUTROPHILS # BLD AUTO: 7.91 10*3/MM3 (ref 1.9–8.1)
NEUTROPHILS NFR BLD AUTO: 76.1 % (ref 42.7–76)
PHOSPHATE SERPL-MCNC: 2.7 MG/DL (ref 2.5–4.5)
PLATELET # BLD AUTO: 275 10*3/MM3 (ref 140–500)
PMV BLD AUTO: 10.9 FL (ref 6–12)
POTASSIUM BLD-SCNC: 4.2 MMOL/L (ref 3.5–5.2)
RBC # BLD AUTO: 4.11 10*6/MM3 (ref 4.6–6)
SODIUM BLD-SCNC: 143 MMOL/L (ref 136–145)
URATE SERPL-MCNC: 3.9 MG/DL (ref 3.4–7)
WBC NRBC COR # BLD: 10.39 10*3/MM3 (ref 4.5–10.7)

## 2017-07-29 PROCEDURE — 84550 ASSAY OF BLOOD/URIC ACID: CPT | Performed by: INTERNAL MEDICINE

## 2017-07-29 PROCEDURE — 85025 COMPLETE CBC W/AUTO DIFF WBC: CPT | Performed by: INTERNAL MEDICINE

## 2017-07-29 PROCEDURE — 83735 ASSAY OF MAGNESIUM: CPT | Performed by: INTERNAL MEDICINE

## 2017-07-29 PROCEDURE — 94799 UNLISTED PULMONARY SVC/PX: CPT

## 2017-07-29 PROCEDURE — 80069 RENAL FUNCTION PANEL: CPT | Performed by: INTERNAL MEDICINE

## 2017-07-29 RX ORDER — AMOXICILLIN AND CLAVULANATE POTASSIUM 875; 125 MG/1; MG/1
1 TABLET, FILM COATED ORAL EVERY 12 HOURS SCHEDULED
Qty: 20 TABLET | Refills: 0 | Status: SHIPPED | OUTPATIENT
Start: 2017-07-29 | End: 2017-08-08

## 2017-07-29 RX ORDER — METOPROLOL TARTRATE 75 MG/1
150 TABLET, FILM COATED ORAL EVERY 12 HOURS SCHEDULED
Qty: 120 TABLET | Refills: 0 | Status: SHIPPED | OUTPATIENT
Start: 2017-07-29 | End: 2017-08-08

## 2017-07-29 RX ORDER — ATORVASTATIN CALCIUM 20 MG/1
20 TABLET, FILM COATED ORAL NIGHTLY
Qty: 30 TABLET | Refills: 2 | Status: SHIPPED | OUTPATIENT
Start: 2017-07-29 | End: 2017-08-08

## 2017-07-29 RX ORDER — ATORVASTATIN CALCIUM 20 MG/1
20 TABLET, FILM COATED ORAL NIGHTLY
Status: DISCONTINUED | OUTPATIENT
Start: 2017-07-29 | End: 2017-07-29 | Stop reason: HOSPADM

## 2017-07-29 RX ORDER — ASPIRIN 81 MG/1
81 TABLET ORAL DAILY
Qty: 30 TABLET | Refills: 0 | Status: SHIPPED | OUTPATIENT
Start: 2017-07-29 | End: 2017-08-08

## 2017-07-29 RX ORDER — BUDESONIDE AND FORMOTEROL FUMARATE DIHYDRATE 160; 4.5 UG/1; UG/1
2 AEROSOL RESPIRATORY (INHALATION)
Qty: 1 INHALER | Refills: 12 | Status: SHIPPED | OUTPATIENT
Start: 2017-07-29 | End: 2017-08-08

## 2017-07-29 RX ORDER — AMLODIPINE BESYLATE 10 MG/1
10 TABLET ORAL
Qty: 30 TABLET | Refills: 0 | Status: SHIPPED | OUTPATIENT
Start: 2017-07-29 | End: 2017-08-08

## 2017-07-29 RX ORDER — HYDROCODONE BITARTRATE AND ACETAMINOPHEN 5; 325 MG/1; MG/1
1 TABLET ORAL EVERY 6 HOURS PRN
Qty: 30 TABLET | Refills: 0 | Status: SHIPPED | OUTPATIENT
Start: 2017-07-29 | End: 2017-07-30

## 2017-07-29 RX ADMIN — AMLODIPINE BESYLATE 10 MG: 10 TABLET ORAL at 08:10

## 2017-07-29 RX ADMIN — ASPIRIN 81 MG: 81 TABLET ORAL at 08:10

## 2017-07-29 RX ADMIN — METOPROLOL TARTRATE 150 MG: 100 TABLET ORAL at 08:10

## 2017-07-29 RX ADMIN — AMOXICILLIN AND CLAVULANATE POTASSIUM 1 TABLET: 875; 125 TABLET, FILM COATED ORAL at 08:10

## 2017-07-29 RX ADMIN — IPRATROPIUM BROMIDE AND ALBUTEROL SULFATE 3 ML: .5; 3 SOLUTION RESPIRATORY (INHALATION) at 07:17

## 2017-07-29 RX ADMIN — BUDESONIDE AND FORMOTEROL FUMARATE DIHYDRATE 2 PUFF: 160; 4.5 AEROSOL RESPIRATORY (INHALATION) at 07:18

## 2017-08-08 RX ORDER — AMOXICILLIN AND CLAVULANATE POTASSIUM 875; 125 MG/1; MG/1
1 TABLET, FILM COATED ORAL 2 TIMES DAILY
Status: ON HOLD | COMMUNITY
End: 2017-08-09

## 2017-08-08 RX ORDER — ASPIRIN 81 MG/1
81 TABLET ORAL DAILY
COMMUNITY
End: 2017-08-16 | Stop reason: SDUPTHER

## 2017-08-08 RX ORDER — ALBUTEROL SULFATE 90 UG/1
2 AEROSOL, METERED RESPIRATORY (INHALATION) EVERY 4 HOURS PRN
COMMUNITY

## 2017-08-08 RX ORDER — ATORVASTATIN CALCIUM 20 MG/1
20 TABLET, FILM COATED ORAL NIGHTLY
COMMUNITY
End: 2017-08-16 | Stop reason: SDUPTHER

## 2017-08-08 RX ORDER — METOPROLOL TARTRATE 37.5 MG/1
TABLET, FILM COATED ORAL
Status: ON HOLD | COMMUNITY
End: 2017-08-09

## 2017-08-08 RX ORDER — BUDESONIDE AND FORMOTEROL FUMARATE DIHYDRATE 160; 4.5 UG/1; UG/1
2 AEROSOL RESPIRATORY (INHALATION)
COMMUNITY

## 2017-08-08 RX ORDER — AMLODIPINE BESYLATE 10 MG/1
10 TABLET ORAL DAILY
COMMUNITY
End: 2017-09-01 | Stop reason: SDUPTHER

## 2017-08-09 ENCOUNTER — HOSPITAL ENCOUNTER (OUTPATIENT)
Facility: HOSPITAL | Age: 67
Setting detail: HOSPITAL OUTPATIENT SURGERY
Discharge: HOME | End: 2017-08-09
Attending: INTERNAL MEDICINE | Admitting: INTERNAL MEDICINE

## 2017-08-09 VITALS
SYSTOLIC BLOOD PRESSURE: 162 MMHG | HEIGHT: 68 IN | WEIGHT: 165.31 LBS | HEART RATE: 47 BPM | DIASTOLIC BLOOD PRESSURE: 62 MMHG | OXYGEN SATURATION: 97 % | TEMPERATURE: 97.5 F | RESPIRATION RATE: 16 BRPM | BODY MASS INDEX: 25.05 KG/M2

## 2017-08-09 DIAGNOSIS — I25.10 CORONARY ARTERY DISEASE INVOLVING NATIVE CORONARY ARTERY OF NATIVE HEART WITHOUT ANGINA PECTORIS: ICD-10-CM

## 2017-08-09 LAB
ACT BLD: 296 SECONDS (ref 82–152)
ANION GAP SERPL CALCULATED.3IONS-SCNC: 15.2 MMOL/L
BASOPHILS # BLD AUTO: 0.08 10*3/MM3 (ref 0–0.2)
BASOPHILS NFR BLD AUTO: 0.8 % (ref 0–1.5)
BUN BLD-MCNC: 27 MG/DL (ref 8–23)
BUN/CREAT SERPL: 20.8 (ref 7–25)
CALCIUM SPEC-SCNC: 9.9 MG/DL (ref 8.6–10.5)
CHLORIDE SERPL-SCNC: 100 MMOL/L (ref 98–107)
CO2 SERPL-SCNC: 26.8 MMOL/L (ref 22–29)
CREAT BLD-MCNC: 1.3 MG/DL (ref 0.76–1.27)
DEPRECATED RDW RBC AUTO: 50.4 FL (ref 37–54)
EOSINOPHIL # BLD AUTO: 0.61 10*3/MM3 (ref 0–0.7)
EOSINOPHIL NFR BLD AUTO: 6.2 % (ref 0.3–6.2)
ERYTHROCYTE [DISTWIDTH] IN BLOOD BY AUTOMATED COUNT: 14.6 % (ref 11.5–14.5)
GFR SERPL CREATININE-BSD FRML MDRD: 55 ML/MIN/1.73
GLUCOSE BLD-MCNC: 97 MG/DL (ref 65–99)
HCT VFR BLD AUTO: 44.3 % (ref 40.4–52.2)
HGB BLD-MCNC: 14 G/DL (ref 13.7–17.6)
IMM GRANULOCYTES # BLD: 0.03 10*3/MM3 (ref 0–0.03)
IMM GRANULOCYTES NFR BLD: 0.3 % (ref 0–0.5)
LYMPHOCYTES # BLD AUTO: 1.55 10*3/MM3 (ref 0.9–4.8)
LYMPHOCYTES NFR BLD AUTO: 15.8 % (ref 19.6–45.3)
MCH RBC QN AUTO: 30 PG (ref 27–32.7)
MCHC RBC AUTO-ENTMCNC: 31.6 G/DL (ref 32.6–36.4)
MCV RBC AUTO: 94.9 FL (ref 79.8–96.2)
MONOCYTES # BLD AUTO: 0.71 10*3/MM3 (ref 0.2–1.2)
MONOCYTES NFR BLD AUTO: 7.2 % (ref 5–12)
NEUTROPHILS # BLD AUTO: 6.85 10*3/MM3 (ref 1.9–8.1)
NEUTROPHILS NFR BLD AUTO: 69.7 % (ref 42.7–76)
PLATELET # BLD AUTO: 256 10*3/MM3 (ref 140–500)
PMV BLD AUTO: 10.9 FL (ref 6–12)
POTASSIUM BLD-SCNC: 5.2 MMOL/L (ref 3.5–5.2)
RBC # BLD AUTO: 4.67 10*6/MM3 (ref 4.6–6)
SODIUM BLD-SCNC: 142 MMOL/L (ref 136–145)
WBC NRBC COR # BLD: 9.83 10*3/MM3 (ref 4.5–10.7)

## 2017-08-09 PROCEDURE — 85025 COMPLETE CBC W/AUTO DIFF WBC: CPT | Performed by: INTERNAL MEDICINE

## 2017-08-09 PROCEDURE — C1874 STENT, COATED/COV W/DEL SYS: HCPCS | Performed by: INTERNAL MEDICINE

## 2017-08-09 PROCEDURE — 99152 MOD SED SAME PHYS/QHP 5/>YRS: CPT | Performed by: INTERNAL MEDICINE

## 2017-08-09 PROCEDURE — 93005 ELECTROCARDIOGRAM TRACING: CPT | Performed by: INTERNAL MEDICINE

## 2017-08-09 PROCEDURE — 25010000002 HEPARIN (PORCINE) PER 1000 UNITS: Performed by: INTERNAL MEDICINE

## 2017-08-09 PROCEDURE — 92928 PRQ TCAT PLMT NTRAC ST 1 LES: CPT | Performed by: INTERNAL MEDICINE

## 2017-08-09 PROCEDURE — C9600 PERC DRUG-EL COR STENT SING: HCPCS | Performed by: INTERNAL MEDICINE

## 2017-08-09 PROCEDURE — 85347 COAGULATION TIME ACTIVATED: CPT

## 2017-08-09 PROCEDURE — C1887 CATHETER, GUIDING: HCPCS | Performed by: INTERNAL MEDICINE

## 2017-08-09 PROCEDURE — C1894 INTRO/SHEATH, NON-LASER: HCPCS | Performed by: INTERNAL MEDICINE

## 2017-08-09 PROCEDURE — C1769 GUIDE WIRE: HCPCS | Performed by: INTERNAL MEDICINE

## 2017-08-09 PROCEDURE — 0 IOPAMIDOL PER 1 ML: Performed by: INTERNAL MEDICINE

## 2017-08-09 PROCEDURE — 93010 ELECTROCARDIOGRAM REPORT: CPT | Performed by: INTERNAL MEDICINE

## 2017-08-09 PROCEDURE — 25010000002 FENTANYL CITRATE (PF) 100 MCG/2ML SOLUTION: Performed by: INTERNAL MEDICINE

## 2017-08-09 PROCEDURE — C1725 CATH, TRANSLUMIN NON-LASER: HCPCS | Performed by: INTERNAL MEDICINE

## 2017-08-09 PROCEDURE — 80048 BASIC METABOLIC PNL TOTAL CA: CPT | Performed by: INTERNAL MEDICINE

## 2017-08-09 PROCEDURE — 25010000002 MIDAZOLAM PER 1 MG: Performed by: INTERNAL MEDICINE

## 2017-08-09 PROCEDURE — 99153 MOD SED SAME PHYS/QHP EA: CPT | Performed by: INTERNAL MEDICINE

## 2017-08-09 DEVICE — XIENCE ALPINE EVEROLIMUS ELUTING CORONARY STENT SYSTEM 2.75 MM X 28 MM / RAPID-EXCHANGE
Type: IMPLANTABLE DEVICE | Status: FUNCTIONAL
Brand: XIENCE ALPINE

## 2017-08-09 RX ORDER — SODIUM CHLORIDE 9 MG/ML
125 INJECTION, SOLUTION INTRAVENOUS CONTINUOUS
Status: DISCONTINUED | OUTPATIENT
Start: 2017-08-09 | End: 2017-08-09 | Stop reason: HOSPADM

## 2017-08-09 RX ORDER — CLOPIDOGREL BISULFATE 75 MG/1
75 TABLET ORAL DAILY
Qty: 90 TABLET | Refills: 3 | Status: SHIPPED | OUTPATIENT
Start: 2017-08-09 | End: 2021-10-27 | Stop reason: SDDI

## 2017-08-09 RX ORDER — ASPIRIN 81 MG/1
81 TABLET, CHEWABLE ORAL DAILY
Status: DISCONTINUED | OUTPATIENT
Start: 2017-08-09 | End: 2017-08-09 | Stop reason: HOSPADM

## 2017-08-09 RX ORDER — ASPIRIN 81 MG/1
81 TABLET, CHEWABLE ORAL DAILY
Refills: 11
Start: 2017-08-09

## 2017-08-09 RX ORDER — LIDOCAINE HYDROCHLORIDE 20 MG/ML
INJECTION, SOLUTION INFILTRATION; PERINEURAL AS NEEDED
Status: DISCONTINUED | OUTPATIENT
Start: 2017-08-09 | End: 2017-08-09 | Stop reason: HOSPADM

## 2017-08-09 RX ORDER — SODIUM CHLORIDE 9 MG/ML
100 INJECTION, SOLUTION INTRAVENOUS CONTINUOUS
Status: DISCONTINUED | OUTPATIENT
Start: 2017-08-09 | End: 2017-08-09 | Stop reason: HOSPADM

## 2017-08-09 RX ORDER — SODIUM CHLORIDE 0.9 % (FLUSH) 0.9 %
1-10 SYRINGE (ML) INJECTION AS NEEDED
Status: DISCONTINUED | OUTPATIENT
Start: 2017-08-09 | End: 2017-08-09 | Stop reason: HOSPADM

## 2017-08-09 RX ORDER — CLOPIDOGREL BISULFATE 75 MG/1
75 TABLET ORAL DAILY
Status: DISCONTINUED | OUTPATIENT
Start: 2017-08-10 | End: 2017-08-09 | Stop reason: HOSPADM

## 2017-08-09 RX ORDER — FENTANYL CITRATE 50 UG/ML
INJECTION, SOLUTION INTRAMUSCULAR; INTRAVENOUS AS NEEDED
Status: DISCONTINUED | OUTPATIENT
Start: 2017-08-09 | End: 2017-08-09 | Stop reason: HOSPADM

## 2017-08-09 RX ORDER — MIDAZOLAM HYDROCHLORIDE 1 MG/ML
INJECTION INTRAMUSCULAR; INTRAVENOUS AS NEEDED
Status: DISCONTINUED | OUTPATIENT
Start: 2017-08-09 | End: 2017-08-09 | Stop reason: HOSPADM

## 2017-08-09 RX ORDER — CLOPIDOGREL BISULFATE 75 MG/1
TABLET ORAL AS NEEDED
Status: DISCONTINUED | OUTPATIENT
Start: 2017-08-09 | End: 2017-08-09 | Stop reason: HOSPADM

## 2017-08-09 RX ORDER — HEPARIN SODIUM 1000 [USP'U]/ML
INJECTION, SOLUTION INTRAVENOUS; SUBCUTANEOUS AS NEEDED
Status: DISCONTINUED | OUTPATIENT
Start: 2017-08-09 | End: 2017-08-09 | Stop reason: HOSPADM

## 2017-08-09 RX ORDER — METOPROLOL TARTRATE 50 MG/1
150 TABLET, FILM COATED ORAL 2 TIMES DAILY
Status: ON HOLD | COMMUNITY
End: 2017-08-09

## 2017-08-09 RX ORDER — LIDOCAINE HYDROCHLORIDE 10 MG/ML
0.1 INJECTION, SOLUTION EPIDURAL; INFILTRATION; INTRACAUDAL; PERINEURAL ONCE AS NEEDED
Status: DISCONTINUED | OUTPATIENT
Start: 2017-08-09 | End: 2017-08-09 | Stop reason: HOSPADM

## 2017-08-09 RX ADMIN — SODIUM CHLORIDE 125 ML/HR: 9 INJECTION, SOLUTION INTRAVENOUS at 08:13

## 2017-08-09 RX ADMIN — SODIUM CHLORIDE 100 ML/HR: 9 INJECTION, SOLUTION INTRAVENOUS at 12:49

## 2017-08-09 NOTE — PERIOPERATIVE NURSING NOTE
XIENCE EVEROLIMUS ELUTING CORONARY STENT SYSTEM PATIENT INFORMATION GUIDE AND STENT IMPLAANT CARD GIVEN TO BROTHER.

## 2017-08-09 NOTE — NURSING NOTE
"Met first with pt in seated recovery secondary to order for cardiac rehab.  Discussed purpose and benefits of cardiac rehabilitation.  Closest program to pt would be in [place], [place].  Provided contact information for that program.  Discussed risk factors with pt, showed pt a coronary stent and explained importance of medication compliance for stent patency (ASA/Plavix).  Pt verbalized signs/symptoms to call 911 about.  Since I wasn't sure pt was following information about cardiac rehab, his nurse had his brother, Que, return to room.  I went over cardiac rehab information again.  Brother says his wife is attending program at [place] so he knows all about it.  Provided education booklet, \"Understanding Cardiac Rehabilitation\".  Pt says he has quit smoking and does feel better.  Advised to take AVS to his first cardiac rehab appointment.  Also recommended pt be released from Dr. Parisi before starting cardiac rehab.  Pt is still getting home health and I explained that also needs to be finished before starting cardiac rehab.  Pt to f/u with APRN at cardiologist office in one week.  Encouraged them to have APRN put referral in to [facility] then.  "

## 2017-08-09 NOTE — DISCHARGE INSTRUCTIONS
[facility]  [address] [place] [address]    Coronary Angiogram with Stent (Radial Approach) After Care    Refer to this sheet in the next few weeks. These instructions provide you with information on caring for yourself after your procedure. Your health care provider may also give you more specific instructions. Your treatment has been planned according to current medical practices, but problems sometimes occur. Call your health care provider if you have any problems or questions after your procedure.       Home Care Instructions:  · You may shower the day after the procedure. Remove the bandage (dressing) and gently wash the site with plain soap and water. Gently pat the site dry. You may apply a band aid daily for 2 days if desired.    · Do not apply powder or lotion to the site.  · Do not submerge the affected site in water for 3 to 5 days or until the site is completely healed.   · Do not flex or bend the affected arm for 24 hours.  · Do not lift, push or pull anything over 10 pounds for 2 days after your procedure.  · Do not operate machinery or power tools for 24 hours.  · Inspect the site at least twice daily. You may notice some bruising at the site and it may be tender for 1 to 2 weeks.     · Increase your fluid intake for the next 2 days.    · Keep arm elevated for 24 hours.  For the remainder of the day, keep your arm in the “Pledge of Allegiance” position when up and about.    · Limit your activity for the next 48 hours and avoid strenuous activity as directed by your physician.   · Cardiac Rehab may or may not be ordered.  Please consult with your physician  · You may drive 24 hours after the procedure unless otherwise instructed by your caregiver.  · A responsible adult should be with you for the first 24 hours after you arrive home.   · Do not make any important legal decisions or sign legal papers for 24 hours.    · Take medicines only as directed by your health care provider.   Blood thinners may be prescribed after your procedure to improve blood flow through the stent.    · Eat a heart-healthy diet. This should include plenty of fresh fruits and vegetables. Meat should be lean cuts. Avoid the following types of food:    ¨ Food that is high in salt.    ¨ Canned or highly processed food.    ¨ Food that is high in saturated fat or sugar.    ¨ Fried food.    · Make any other lifestyle changes recommended by your health care provider. This may include:    ¨ Not using any tobacco products including cigarettes, chewing tobacco, or electronic cigarettes.   ¨ Managing your weight.    ¨ Getting regular exercise.    ¨ Managing your blood pressure.    ¨ Limiting your alcohol intake.    ¨ Managing other health problems, such as diabetes.    · If you need an MRI after your heart stent was placed, be sure to tell the health care provider who orders the MRI that you have a heart stent.    · Keep all follow-up visits as directed by your health care provider.    ·   Call Your Doctor If:  · You have unusual pain at the radial/ulnar (wrist) site.  · You have redness, warmth, swelling, or pain at the radial/ulnar (wrist) site.  · You have drainage (other than a small amount of blood on the dressing).  · `You have chills or a fever > 101.  · Your arm becomes pale or dark, cool, tingly, or numb.  · You develop chest pain, shortness of breath, feel faint or pass out.    · You have heavy bleeding from the site, hold pressure on the site for 20 minutes.  If the bleeding stops, apply a fresh bandage and call your cardiologist.  However, if you continue to have bleeding, call 911.        Make Sure You:   · Understand these instructions.  · Will watch your condition.  · Will get help right away if you are not doing well or get worse.

## 2017-08-09 NOTE — H&P (VIEW-ONLY)
[facility]  Progress note: 2017    Patient Identification:  Name:Narciso Constantino  Age:67 y.o.  Sex: male  :  1950  MRN: [id number]           CC:  Follow-up of coronary artery disease with a non-ST elevation myocardial infarction    Interval history:  Underwent cardiac catheterization that showed 80% mid LAD stenosis, 100% mid circumflex artery stenosis with left to left and right-to-left collaterals.  Right coronary artery was dominant with 20% stenosis.  In attempts to investigate the circumflex disease further perforation occurred this was treated with balloon angioplasty and protamine.  Case was stopped with plans to return for PCI of the LAD in 2 week.  He denies any chest pain or shortness of breath.  He is upset as his dentures have been lost in transfer.    Vital Signs:   Temp:  [97.8 °F (36.6 °C)-98.3 °F (36.8 °C)] 98 °F (36.7 °C)  Heart Rate:  [48-61] 59  Resp:  [16-18] 18  BP: (124-149)/(52-78) 149/78    Intake/Output Summary (Last 24 hours) at 17 0847  Last data filed at 17 0700   Gross per 24 hour   Intake              345 ml   Output             1300 ml   Net             -955 ml       Physical Examination:    General Appearance No acute distress   Neck No adenopathy, supple, trachea midline, no thyromegaly, no carotid bruit, no JVD   Lungs Clear to auscultation,respirations regular, even and unlabored   Heart Regular rhythm and normal rate, normal S1 and S2, no murmur, no gallop, no rub, no click   Chest wall No abnormalities observed   Abdomen Normal bowel sounds, no masses, no hepatomegaly, soft   Extremities Moves all extremities well, no edema, no cyanosis, no redness   Neurological Alert and oriented x 3     Lab Review:  Personally reviewed the labs, radiology imaging and other cardiac procedures.   Results from last 7 days  Lab Units 17  0335  17  0411   SODIUM mmol/L 143  < > 144   POTASSIUM mmol/L 4.1  < > 4.3   CHLORIDE mmol/L 107  < > 107   CO2  mmol/L 25.5  < > 24.6   BUN mg/dL 26*  < > 21   CREATININE mg/dL 1.18  < > 1.07   CALCIUM mg/dL 8.4*  < > 8.3*   BILIRUBIN mg/dL  --   --  2.0*   ALK PHOS U/L  --   --  149*   ALT (SGPT) U/L  --   --  32   AST (SGOT) U/L  --   --  37   GLUCOSE mg/dL 123*  < > 114*   < > = values in this interval not displayed.      )  Results from last 7 days  Lab Units 07/28/17  0335 07/27/17  0459 07/25/17  0535   WBC 10*3/mm3 9.97 10.47 12.32*   HEMOGLOBIN g/dL 12.7* 12.6* 12.8*   HEMATOCRIT % 40.8 40.3* 40.4   PLATELETS 10*3/mm3 265 263 206           Medication Review:   Meds reviewed  Scheduled Meds:  amLODIPine 10 mg Oral Q24H   amoxicillin-clavulanate 1 tablet Oral Q12H   aspirin 81 mg Oral Daily   budesonide-formoterol 2 puff Inhalation BID - RT   ipratropium-albuterol 3 mL Nebulization Q6H - RT   metoprolol tartrate 150 mg Oral Q12H   nicotine 1 patch Transdermal Q24H     Continuous Infusions:  sodium chloride 75 mL/hr Last Rate: 75 mL/hr (07/27/17 0635)     I personally viewed and interpreted the patient's EKG/Telemetry data    Assessment and Plan  1.  Non-ST elevation myocardial infarction with multivessel coronary artery disease. Failed attempt at PCI of the circumflex artery.  He will return in 2 weeks time for LAD revascularization  2.  Status post abdominal aortic aneurysm rupture status post repair.  3.  Hypertension improved  4.  Renal insufficiency  5.  Respiratory failure  6.  COPD with ongoing use  7.  Exophytic renal cyst, Evelio per nephrology    Mini Gomez  7/28/20178:47 AM  25min spent in reviewing records, discussion and examination of the patient and discussion with other members of the patient's medical team.     Dictated utilizing Dragon dictation

## 2017-08-09 NOTE — PERIOPERATIVE NURSING NOTE
Dr. Kimbrough at bedside discussing results and plan of care with patient and family. States patient will be discharged at 1530 if no complications present themselves.   Dr. Kimbrough discussing need for Plavix and Aspirin 81 mg at length as well as the reduction in the amount of Toprol taken each day.

## 2017-08-09 NOTE — NURSING NOTE
Removed dressings on right buttocks and top of crack of buttocks.  Both areas healing without exudate or redness. Cleaned with sterile sailine and dried with two by two.  Reapplied tegaderm and explained to patient and brother to remove dressing in 24 hours and keep area clean and dry.

## 2017-08-09 NOTE — PLAN OF CARE
Problem: Patient Care Overview (Adult)  Goal: Plan of Care Review  Outcome: Outcome(s) achieved Date Met:  08/09/17 08/09/17 1553   Coping/Psychosocial Response Interventions   Plan Of Care Reviewed With patient;sibling   Outcome Evaluation   Outcome Summary/Follow up Plan READY FOR D/C       Goal: Adult Individualization and Mutuality  Outcome: Outcome(s) achieved Date Met:  08/09/17  Goal: Discharge Needs Assessment  Outcome: Outcome(s) achieved Date Met:  08/09/17    Problem: Cardiac Catheterization with/without PCI (Adult)  Goal: Signs and Symptoms of Listed Potential Problems Will be Absent or Manageable (Cardiac Catheterization with/without PCI)  Outcome: Outcome(s) achieved Date Met:  08/09/17

## 2017-08-09 NOTE — INTERVAL H&P NOTE
Recent nstemi post surgery. ateempted pci on circ and had a perforation.  We will bring back today for a pci on the lad.  H&P reviewed. The patient was examined and there are no changes to the H&P. I have explained the risks and benefits of the procedure to the patient.  The patient understands and agrees to proceed

## 2017-08-16 ENCOUNTER — OFFICE VISIT (OUTPATIENT)
Dept: CARDIOLOGY | Facility: CLINIC | Age: 67
End: 2017-08-16

## 2017-08-16 VITALS
DIASTOLIC BLOOD PRESSURE: 72 MMHG | WEIGHT: 112 LBS | BODY MASS INDEX: 19.84 KG/M2 | HEART RATE: 52 BPM | HEIGHT: 63 IN | SYSTOLIC BLOOD PRESSURE: 152 MMHG

## 2017-08-16 DIAGNOSIS — F17.210 CIGARETTE NICOTINE DEPENDENCE WITHOUT COMPLICATION: ICD-10-CM

## 2017-08-16 DIAGNOSIS — I71.30 RUPTURED ABDOMINAL AORTIC ANEURYSM (AAA) (HCC): ICD-10-CM

## 2017-08-16 DIAGNOSIS — I10 ESSENTIAL HYPERTENSION: ICD-10-CM

## 2017-08-16 DIAGNOSIS — I25.10 CORONARY ARTERY DISEASE INVOLVING NATIVE CORONARY ARTERY OF NATIVE HEART WITHOUT ANGINA PECTORIS: Primary | ICD-10-CM

## 2017-08-16 DIAGNOSIS — R00.1 BRADYCARDIA: ICD-10-CM

## 2017-08-16 PROCEDURE — 93000 ELECTROCARDIOGRAM COMPLETE: CPT | Performed by: NURSE PRACTITIONER

## 2017-08-16 PROCEDURE — 99214 OFFICE O/P EST MOD 30 MIN: CPT | Performed by: NURSE PRACTITIONER

## 2017-08-16 RX ORDER — ATORVASTATIN CALCIUM 20 MG/1
20 TABLET, FILM COATED ORAL NIGHTLY
Qty: 30 TABLET | Refills: 1 | Status: SHIPPED | OUTPATIENT
Start: 2017-08-16 | End: 2021-11-16 | Stop reason: DRUGHIGH

## 2017-08-16 NOTE — PROGRESS NOTES
Date of Office Visit: 2017  Encounter Provider: FLORENTIN Schneider  Place of Service: [facility] CARDIOLOGY  Patient Name: Narciso Constantino  :1950    Chief Complaint   Patient presents with   • Coronary Artery Disease   :     HPI: Narciso Constantino is a 67 y.o. male who presents today for hospital follow up. He is an established patient of Dr. Heidi Gomez. He is new to me and his previous records have been reviewed.  He has a history of peripheral arterial disease, abdominal aortic aneurysm, status post aneurysm repair, chronic bronchitis, COPD, previous nicotine dependence, and hypertension.    In July he was admitted to [facility] for management of a ruptured abdominal aortic aneurysm.  He was taken emergently to the operating room and endovascular repair was done.  He had a 2-D echocardiogram completed which revealed an ejection fraction of 55%, grade 1 diastolic dysfunction, wall motion abnormalities, mild to moderate aortic valve regurgitation, moderate thickening of the aortic valve, mitral annular calcification, and mild mitral valve regurgitation.  He had a nuclear stress test completed which revealed an ejection fraction of 48%, medium to large sized infarction in the lateral wall and inferior wall with mild to moderate alex-infarct ischemia.  He was then recommended to undergo cardiac catheterization with the following results: Left main normal, LAD 80% mid stenosis, circumflex 100% mid occlusion with left to left and right-to-left collaterals, RCA proximal 20% disease.  He developed a small perforation and he was recommended to come back in a couple of weeks for PCI on his LAD via the radial approach.  On 17 he underwent successful drug-eluting stent placement to the mid LAD with a 2.75×28 mm Xience Alpine drug-eluting stent.      Mr. Constantino presents today for follow-up.  His biggest complaint is that he is not eating well because of her loss  during his hospitalization.  He has noticed some swelling of his right foot and ankle since leaving the hospital.  He denies chest pain, shortness of air, paroxysmal nocturnal dyspnea, orthopnea, cough, palpitations, or syncope.  He's had some mild dizziness with positional changes.  His blood pressures up and checked at home which are averaging 120/70.  He has remained bradycardic.    Past Medical History:   Diagnosis Date   • AAA (abdominal aortic aneurysm) 2017    REPAIRED   • Chest pain    • Chronic bronchitis    • COPD (chronic obstructive pulmonary disease)    • Coronary artery disease    • Dyspnea    • Hypertension        Past Surgical History:   Procedure Laterality Date   • ABDOMINAL AORTIC ANEURYSM REPAIR, AORTIC BIFEMORAL ILIAC RENAL BYPASS N/A 7/18/2017    Procedure: BILATERAL FEMORAL CUT DOWN, AORTIC ILIOFEMORAL ARTERIOGRAM, BILATERAL ARTERY ANGIOPLASTY, GORE STENT GRAFT REPAIR OF RUPTURED AORTIC ANEURYSM;  Surgeon: Guevara Parisi MD;  Location: [facility] OR 18/19;  Service:    • CARDIAC CATHETERIZATION Left 7/27/2017    Procedure: Cardiac Catheterization/Vascular Study- radial approach only;  Surgeon: Ervin Kimbrough MD;  Location: [facility] INVASIVE LOCATION;  Service:    • CARDIAC CATHETERIZATION N/A 7/27/2017    Procedure: Left Heart Cath;  Surgeon: Ervin Kimbrough MD;  Location: [facility] INVASIVE LOCATION;  Service:    • CARDIAC CATHETERIZATION N/A 7/27/2017    Procedure: Left ventriculography;  Surgeon: Ervin Kimbrough MD;  Location: [facility] INVASIVE LOCATION;  Service:    • CARDIAC CATHETERIZATION N/A 7/27/2017    Procedure: Coronary angiography;  Surgeon: Ervin Kimbrough MD;  Location: [facility] INVASIVE LOCATION;  Service:    • CARDIAC CATHETERIZATION N/A 7/27/2017    Procedure: Angioplasty-coronary;  Surgeon: Ervin Kimbrough MD;  Location: [facility] INVASIVE LOCATION;  Service:    • CARDIAC CATHETERIZATION N/A 8/9/2017    Procedure: Stent LUISANA coronary;  Surgeon: Ervin  MD Luis E;  Location:  [facility] CATH INVASIVE LOCATION;  Service:    • GA RT/LT HEART CATHETERS N/A 8/9/2017    Procedure: Percutaneous Coronary Intervention LAD;  Surgeon: Ervin Kimbrough MD;  Location:  [facility] CATH INVASIVE LOCATION;  Service: Cardiovascular       Social History     Social History   • Marital status:      Spouse name: N/A   • Number of children: N/A   • Years of education: N/A     Occupational History   • Not on file.     Social History Main Topics   • Smoking status: Former Smoker     Packs/day: 2.00     Years: 50.00     Types: Cigarettes     Quit date: 7/18/2017   • Smokeless tobacco: Not on file   • Alcohol use No   • Drug use: No   • Sexual activity: Defer     Other Topics Concern   • Not on file     Social History Narrative       Family History   Problem Relation Age of Onset   • No Known Problems Mother    • No Known Problems Father        Review of Systems   Constitution: Negative for chills, diaphoresis, fever, malaise/fatigue, night sweats, weight gain and weight loss.   HENT: Negative for hearing loss, nosebleeds, sore throat and tinnitus.    Eyes: Negative for blurred vision, double vision, pain and visual disturbance.   Cardiovascular: Positive for leg swelling. Negative for chest pain, claudication, cyanosis, dyspnea on exertion, irregular heartbeat, near-syncope, orthopnea, palpitations, paroxysmal nocturnal dyspnea and syncope.   Respiratory: Negative for cough, hemoptysis, shortness of breath, snoring and wheezing.    Endocrine: Negative for cold intolerance, heat intolerance and polyuria.   Hematologic/Lymphatic: Negative for bleeding problem. Does not bruise/bleed easily.   Skin: Negative for color change, dry skin, flushing and itching.   Musculoskeletal: Negative for falls, joint pain, joint swelling, muscle cramps, muscle weakness and myalgias.   Gastrointestinal: Negative for abdominal pain, constipation, heartburn, melena, nausea and vomiting.   Genitourinary: Negative  "for dysuria and hematuria.   Neurological: Positive for dizziness. Negative for excessive daytime sleepiness, light-headedness, loss of balance, numbness, paresthesias, seizures and vertigo.   Psychiatric/Behavioral: Negative for altered mental status, depression, memory loss and substance abuse. The patient does not have insomnia and is not nervous/anxious.    Allergic/Immunologic: Negative for environmental allergies.       No Known Allergies      Current Outpatient Prescriptions:   •  albuterol (PROVENTIL HFA) 108 (90 BASE) MCG/ACT inhaler, Inhale 2 puffs Every 4 (Four) Hours As Needed for Wheezing., Disp: , Rfl:   •  amLODIPine (NORVASC) 10 MG tablet, Take 10 mg by mouth Daily., Disp: , Rfl:   •  aspirin 81 MG chewable tablet, Chew 1 tablet Daily., Disp: , Rfl: 11  •  atorvastatin (LIPITOR) 20 MG tablet, Take 1 tablet by mouth Every Night., Disp: 30 tablet, Rfl: 1  •  budesonide-formoterol (SYMBICORT) 160-4.5 MCG/ACT inhaler, Inhale 2 puffs 2 (Two) Times a Day., Disp: , Rfl:   •  clopidogrel (PLAVIX) 75 MG tablet, Take 1 tablet by mouth Daily., Disp: 90 tablet, Rfl: 3  •  metoprolol tartrate (LOPRESSOR) 25 MG tablet, Take 1 tablet by mouth 2 (Two) Times a Day., Disp: 60 tablet, Rfl: 3     Objective:     Vitals:    08/16/17 1322   BP: 152/72   BP Location: Left arm   Pulse: 52   Weight: 112 lb (50.8 kg)   Height: 63\" (160 cm)     Body mass index is 19.84 kg/(m^2).    PHYSICAL EXAM:    Vitals Reviewed.   General Appearance: No acute distress, well developed and well nourished. Thin.  Eyes: Conjunctiva and lids: No erythema, swelling, or discharge. Sclera non-icteric.   HENT: Atraumatic, normocephalic. External eyes, ears, and nose normal. No hearing loss noted. Mucous membranes normal. Lips not cyanotic. Neck supple with no tenderness.  Missing teeth.  Respiratory: No signs of respiratory distress. Respiration rhythm and depth normal.   Clear to auscultation. No rales, crackles, rhonchi, or wheezing auscultated. "   Cardiovascular:  Jugular Venous Pressure: Normal  Heart Rate and Rhythm: Normal, Heart Sounds: Normal S1 and S2. No S3 or S4 noted.  Murmurs: No murmurs noted. No rubs, thrills, or gallops.   Arterial Pulses: Carotid pulses normal. No carotid bruit noted. Posterior tibialis and dorsalis pedis pulses normal.   Lower Extremities: Right foot edema noted.  Gastrointestinal:  Abdomen soft, non-distended, non-tender. Normal bowel sounds. No hepatomegaly.   Musculoskeletal: Normal movement of extremities  Skin and Nails: General appearance normal. No pallor, cyanosis, diaphoresis. Skin temperature normal. No clubbing of fingernails.   Psychiatric: Patient alert and oriented to person, place, and time. Speech and behavior appropriate. Normal mood and affect.       ECG 12 Lead  Date/Time: 8/16/2017 1:27 PM  Performed by: HANY THOMASON  Authorized by: HANY THOMASON   Comparison: compared with previous ECG from 8/9/2017  Similar to previous ECG  Rhythm: sinus bradycardia  Rate: normal  BPM: 52  Conduction: 1st degree  T Waves: T waves normal  QRS axis: normal  Other: no other findings  Clinical impression: abnormal ECG  Comments: ST abnormalities               Assessment:       Diagnosis Plan   1. Coronary artery disease involving native coronary artery of native heart without angina pectoris  atorvastatin (LIPITOR) 20 MG tablet   2. Essential hypertension     3. Bradycardia     4. Cigarette nicotine dependence without complication     5. Ruptured abdominal aortic aneurysm (AAA)            Plan:       1. Coronary Artery Disease  Assessment  • The patient has no angina    Plan  • Lifestyle modifications discussed include adhering to a heart healthy diet, avoidance of tobacco products, maintenance of a healthy weight, medication compliance, regular exercise and regular monitoring of cholesterol and blood pressure    Subjective - Objective  • There has been a previous stent procedure using LUISANA  • Current antiplatelet  therapy includes aspirin 81 mg and clopidogrel 75 mg  •   He denies any further anginal symptoms.  He is on good medications for secondary prevention including aspirin, amlodipine, atorvastatin, metoprolol, and clopidogrel.  He needs to continue with dual antiplatelet therapy for at least 1 year.  I've refilled his atorvastatin today.  His blood pressure is elevated.  He states his blood pressures have been well-controlled with the home health nurse at 120/70.  He will continue to monitor.    We discussed participating in cardiac rehabilitation.  He lives in [place], [place] and will not be able to participate due to the drive.  He is also inquiring if he can go back to driving.  I told him I will further discuss with Dr. Heidi Gomez.    2.  Essential Hypertension: He will continue to monitor.  He was concerned about some swelling of his right ankle.  It's possible that it may be secondary to his calcium channel blocker.    3.  Bradycardia: He experiences occasional dizziness.  I will further discuss his beta blocker therapy with Dr. Gomez.    4.  Nicotine Dependence: He was able to successfully quit smoking in July.  I've applauded his efforts.    5.  Ruptured Abdominal Aortic Aneurysm: He is status post repair.    6.  His biggest concern is that he's lost his dentures in the hospital.  He is unable to eat many foods and has lost weight.  I've recommended that he contact [facility] to see if his dentures have been found.    7.  Follow Up: He will follow-up with Dr. Heidi Gomez in September.        As always, it has been a pleasure to participate in your patient's care.      Sincerely,         FLORENTIN Fleming

## 2017-08-21 ENCOUNTER — TRANSCRIBE ORDERS (OUTPATIENT)
Dept: ADMINISTRATIVE | Facility: HOSPITAL | Age: 67
End: 2017-08-21

## 2017-08-21 DIAGNOSIS — R91.1 PULMONARY NODULE: Primary | ICD-10-CM

## 2017-09-01 ENCOUNTER — OFFICE VISIT (OUTPATIENT)
Dept: CARDIOLOGY | Facility: CLINIC | Age: 67
End: 2017-09-01

## 2017-09-01 ENCOUNTER — HOSPITAL ENCOUNTER (OUTPATIENT)
Dept: GENERAL RADIOLOGY | Facility: HOSPITAL | Age: 67
Discharge: HOME | End: 2017-09-01
Attending: SURGERY | Admitting: SURGERY

## 2017-09-01 VITALS
SYSTOLIC BLOOD PRESSURE: 180 MMHG | HEART RATE: 60 BPM | HEIGHT: 68 IN | BODY MASS INDEX: 17.73 KG/M2 | WEIGHT: 117 LBS | DIASTOLIC BLOOD PRESSURE: 78 MMHG

## 2017-09-01 DIAGNOSIS — I25.10 CORONARY ARTERY DISEASE INVOLVING NATIVE CORONARY ARTERY OF NATIVE HEART WITHOUT ANGINA PECTORIS: Primary | ICD-10-CM

## 2017-09-01 DIAGNOSIS — Z98.890 S/P AAA (ABDOMINAL AORTIC ANEURYSM) REPAIR: ICD-10-CM

## 2017-09-01 DIAGNOSIS — Z86.79 S/P AAA (ABDOMINAL AORTIC ANEURYSM) REPAIR: ICD-10-CM

## 2017-09-01 DIAGNOSIS — I71.30 RUPTURED ABDOMINAL AORTIC ANEURYSM (AAA) (HCC): ICD-10-CM

## 2017-09-01 PROCEDURE — 93000 ELECTROCARDIOGRAM COMPLETE: CPT | Performed by: INTERNAL MEDICINE

## 2017-09-01 PROCEDURE — 74010 HC ABDOMEN SERIES FOR ANEURYSM: CPT

## 2017-09-01 PROCEDURE — 99214 OFFICE O/P EST MOD 30 MIN: CPT | Performed by: INTERNAL MEDICINE

## 2017-09-01 RX ORDER — AMLODIPINE BESYLATE 10 MG/1
10 TABLET ORAL DAILY
Qty: 30 TABLET | Refills: 6 | Status: ON HOLD | OUTPATIENT
Start: 2017-09-01 | End: 2021-07-16 | Stop reason: SDUPTHER

## 2017-09-02 NOTE — PROGRESS NOTES
Date of Office Visit: 2017  Encounter Provider: Heidi Gomez MD  Place of Service: [facility] CARDIOLOGY  Patient Name: Narciso Constantino  :1950    Chief complaint  Followup of CAD    History of Present Illness  67 year old man with has previously not had much medical care and presented to [facility] and 2017 with a ruptured abdominal aortic aneurysm she survived and underwent stent repair.  He was noted to have a non-ST elevation myocardial infarction.  An echocardiogram revealed normal systolic function grade 1 diastolic dysfunction with wall motion abnormalities, mild to moderate aortic valve regurgitation, mild mitral regurgitation.  A stress perfusion study revealed a large area of infarction in the infero-wall and lateral wall with moderate alex-infarct ischemia with an ejection fraction of 48%.  Cardiac catheterization revealed severe LAD stenosis (80%) as well as 100% mid circumflex occlusion with left-to-right collaterals.  A stent was attempted to the circumflex artery but was unsuccessful with a small perforation therefore this study was aborted and he presented again in  with 4 drug coated stent to the LAD which he tolerated well.  The circumflex was not revascularized.    Since then he states he had been doing well though ran out of amlodipine 2 days ago.  He did not call our office for this.  He denies any palpitations shortness of breath chest pain.  He has mild dependent edema.  He has had mild nosebleed is on aspirin and Plavix    Past Medical History:   Diagnosis Date   • AAA (abdominal aortic aneurysm) 2017    REPAIRED   • Chest pain    • Chronic bronchitis    • COPD (chronic obstructive pulmonary disease)    • Coronary artery disease    • Dyspnea    • Hypertension      Past Surgical History:   Procedure Laterality Date   • ABDOMINAL AORTIC ANEURYSM REPAIR, AORTIC BIFEMORAL ILIAC RENAL BYPASS N/A 2017    Procedure: BILATERAL FEMORAL CUT  DOWN, AORTIC ILIOFEMORAL ARTERIOGRAM, BILATERAL ARTERY ANGIOPLASTY, GORE STENT GRAFT REPAIR OF RUPTURED AORTIC ANEURYSM;  Surgeon: Guevara Parisi MD;  Location: [facility] OR 18/19;  Service:    • CARDIAC CATHETERIZATION Left 7/27/2017    Procedure: Cardiac Catheterization/Vascular Study- radial approach only;  Surgeon: Ervin Kimbrough MD;  Location: [facility] CATH INVASIVE LOCATION;  Service:    • CARDIAC CATHETERIZATION N/A 7/27/2017    Procedure: Left Heart Cath;  Surgeon: Ervin Kimbrough MD;  Location: [facility] CATH INVASIVE LOCATION;  Service:    • CARDIAC CATHETERIZATION N/A 7/27/2017    Procedure: Left ventriculography;  Surgeon: Ervin Kimbrough MD;  Location: [facility] CATH INVASIVE LOCATION;  Service:    • CARDIAC CATHETERIZATION N/A 7/27/2017    Procedure: Coronary angiography;  Surgeon: Ervin Kimbrough MD;  Location: [facility] CATH INVASIVE LOCATION;  Service:    • CARDIAC CATHETERIZATION N/A 7/27/2017    Procedure: Angioplasty-coronary;  Surgeon: Ervin Kimbrough MD;  Location: [facility] CATH INVASIVE LOCATION;  Service:    • CARDIAC CATHETERIZATION N/A 8/9/2017    Procedure: Stent LUISANA coronary;  Surgeon: Ervin Kimbrough MD;  Location: [facility] CATH INVASIVE LOCATION;  Service:    • NC RT/LT HEART CATHETERS N/A 8/9/2017    Procedure: Percutaneous Coronary Intervention LAD;  Surgeon: Ervin Kimbrough MD;  Location: [facility] CATH INVASIVE LOCATION;  Service: Cardiovascular     Outpatient Medications Prior to Visit   Medication Sig Dispense Refill   • albuterol (PROVENTIL HFA) 108 (90 BASE) MCG/ACT inhaler Inhale 2 puffs Every 4 (Four) Hours As Needed for Wheezing.     • aspirin 81 MG chewable tablet Chew 1 tablet Daily.  11   • atorvastatin (LIPITOR) 20 MG tablet Take 1 tablet by mouth Every Night. 30 tablet 1   • budesonide-formoterol (SYMBICORT) 160-4.5 MCG/ACT inhaler Inhale 2 puffs 2 (Two) Times a Day.     • clopidogrel (PLAVIX) 75 MG tablet Take 1 tablet by mouth Daily. 90 tablet 3   • metoprolol tartrate  (LOPRESSOR) 25 MG tablet Take 1 tablet by mouth 2 (Two) Times a Day. 60 tablet 3   • amLODIPine (NORVASC) 10 MG tablet Take 10 mg by mouth Daily.       No facility-administered medications prior to visit.      Allergies as of 09/01/2017   • (No Known Allergies)     Social History     Social History   • Marital status:      Spouse name: N/A   • Number of children: N/A   • Years of education: N/A     Occupational History   • Not on file.     Social History Main Topics   • Smoking status: Former Smoker     Packs/day: 2.00     Years: 50.00     Types: Cigarettes     Quit date: 7/18/2017   • Smokeless tobacco: Not on file   • Alcohol use No   • Drug use: No   • Sexual activity: Defer     Other Topics Concern   • Not on file     Social History Narrative     Family History   Problem Relation Age of Onset   • No Known Problems Mother    • No Known Problems Father      Review of Systems   Constitution: Negative for fever, malaise/fatigue, weight gain and weight loss.   HENT: Positive for nosebleeds. Negative for ear pain, hearing loss and sore throat.    Eyes: Negative for double vision, pain, vision loss in left eye and vision loss in right eye.   Cardiovascular: Positive for leg swelling.        See history of present illness.   Respiratory: Negative for cough, shortness of breath, sleep disturbances due to breathing, snoring and wheezing.    Endocrine: Negative for cold intolerance, heat intolerance and polyuria.   Skin: Negative for itching, poor wound healing and rash.   Musculoskeletal: Negative for joint pain, joint swelling and myalgias.   Gastrointestinal: Negative for abdominal pain, diarrhea, hematochezia, nausea and vomiting.   Genitourinary: Negative for hematuria and hesitancy.   Neurological: Negative for numbness, paresthesias and seizures.   Psychiatric/Behavioral: Negative for depression. The patient is not nervous/anxious.      Objective:     Vitals:    09/01/17 1057   BP: 180/78   Pulse: 60  "  Weight: 117 lb (53.1 kg)   Height: 68\" (172.7 cm)     Body mass index is 17.79 kg/(m^2).    Physical Exam   Constitutional: He is oriented to person, place, and time. He appears well-developed and well-nourished.   HENT:   Head: Normocephalic.   Nose: Nose normal.   Mouth/Throat: Oropharynx is clear and moist.   Eyes: Conjunctivae and EOM are normal. Pupils are equal, round, and reactive to light. Right eye exhibits no discharge. No scleral icterus.   Neck: Normal range of motion. Neck supple. No JVD present. No thyromegaly present.   Cardiovascular: Normal rate, regular rhythm, normal heart sounds and intact distal pulses.  Exam reveals no gallop and no friction rub.    No murmur heard.  Pulses:       Carotid pulses are 2+ on the right side, and 2+ on the left side.       Radial pulses are 2+ on the right side, and 2+ on the left side.        Femoral pulses are 2+ on the right side, and 2+ on the left side.       Popliteal pulses are 2+ on the right side, and 2+ on the left side.        Dorsalis pedis pulses are 2+ on the right side, and 2+ on the left side.        Posterior tibial pulses are 2+ on the right side, and 2+ on the left side.   Pulmonary/Chest: Effort normal and breath sounds normal. No respiratory distress. He has no wheezes. He has no rales.   Abdominal: Soft. Bowel sounds are normal. He exhibits no distension. There is no hepatosplenomegaly. There is no tenderness. There is no rebound.   Musculoskeletal: Normal range of motion. He exhibits no edema or tenderness.   Neurological: He is alert and oriented to person, place, and time.   Skin: Skin is warm and dry. No rash noted. No erythema.   Psychiatric: He has a normal mood and affect. His behavior is normal. Judgment and thought content normal.   Vitals reviewed.    Lab Review:     ECG 12 Lead  Date/Time: 9/1/2017 11:31 PM  Performed by: HITESH NIELSEN  Authorized by: HITESH NIELSEN   Comparison: compared with previous ECG   Rhythm: sinus " rhythm  Conduction: 1st degree  Conduction comments: Nonspecific ST T wave changes  QTc = 493msec  Other findings: LVH  Clinical impression: abnormal ECG          Assessment:       Diagnosis Plan   1. Coronary artery disease involving native coronary artery of native heart without angina pectoris  Treadmill Stress Test   2. Ruptured abdominal aortic aneurysm (AAA)       Plan:       1. CAD with recent infarction and recent LUISANA to .Lady with residual disease of the circumflex artery.  Continue with lifestyle modification risk factor modification and a rehabilitation treadmill test and to initiate cardiac rehabilitation closer to home.  As long as his rehabilitation treadmill is normal and he is able to at least participate 2 weeks of rehabilitation he can go back to driving  2. AAA, s/p rupture with stent repair follow-up with Dr. Parisi   3. Hypertension  4. COPD  5. Recent acute renal failure  6. Hyperlipidemia         Narciso Constantino   Home Medication Instructions JASS:    Printed on:09/01/17 2416   Medication Information                      albuterol (PROVENTIL HFA) 108 (90 BASE) MCG/ACT inhaler  Inhale 2 puffs Every 4 (Four) Hours As Needed for Wheezing.             amLODIPine (NORVASC) 10 MG tablet  Take 1 tablet by mouth Daily.             aspirin 81 MG chewable tablet  Chew 1 tablet Daily.             atorvastatin (LIPITOR) 20 MG tablet  Take 1 tablet by mouth Every Night.             budesonide-formoterol (SYMBICORT) 160-4.5 MCG/ACT inhaler  Inhale 2 puffs 2 (Two) Times a Day.             clopidogrel (PLAVIX) 75 MG tablet  Take 1 tablet by mouth Daily.             metoprolol tartrate (LOPRESSOR) 25 MG tablet  Take 1 tablet by mouth 2 (Two) Times a Day.             Umeclidinium-Vilanterol (ANORO ELLIPTA) 62.5-25 MCG/INH aerosol powder   Inhale.                 New Medications Ordered This Visit   Medications   • amLODIPine (NORVASC) 10 MG tablet     Sig: Take 1 tablet by mouth Daily.     Dispense:  30 tablet      Refill:  6       Dictated utilizing Dragon dictation

## 2017-09-06 ENCOUNTER — HOSPITAL ENCOUNTER (OUTPATIENT)
Dept: CARDIOLOGY | Facility: HOSPITAL | Age: 67
Discharge: HOME | End: 2017-09-06
Attending: INTERNAL MEDICINE | Admitting: INTERNAL MEDICINE

## 2017-09-06 DIAGNOSIS — I25.10 CORONARY ARTERY DISEASE INVOLVING NATIVE CORONARY ARTERY OF NATIVE HEART WITHOUT ANGINA PECTORIS: ICD-10-CM

## 2017-09-06 LAB
BH CV STRESS BP STAGE 1: NORMAL
BH CV STRESS BP STAGE 2: NORMAL
BH CV STRESS DURATION MIN STAGE 1: 3
BH CV STRESS DURATION MIN STAGE 2: 3
BH CV STRESS DURATION SEC STAGE 1: 0
BH CV STRESS DURATION SEC STAGE 2: 0
BH CV STRESS GRADE STAGE 1: 10
BH CV STRESS GRADE STAGE 2: 12
BH CV STRESS HR STAGE 1: 69
BH CV STRESS HR STAGE 2: 77
BH CV STRESS METS STAGE 1: 5
BH CV STRESS METS STAGE 2: 7.5
BH CV STRESS PROTOCOL 1: NORMAL
BH CV STRESS RECOVERY BP: NORMAL MMHG
BH CV STRESS RECOVERY HR: 61 BPM
BH CV STRESS SPEED STAGE 1: 1.7
BH CV STRESS SPEED STAGE 2: 2.5
BH CV STRESS STAGE 1: 1
BH CV STRESS STAGE 2: 2
MAXIMAL PREDICTED HEART RATE: 153 BPM
PERCENT MAX PREDICTED HR: 50.33 %
STRESS BASELINE BP: NORMAL MMHG
STRESS BASELINE HR: 61 BPM
STRESS PERCENT HR: 59 %
STRESS POST PEAK BP: NORMAL MMHG
STRESS POST PEAK HR: 77 BPM
STRESS TARGET HR: 130 BPM

## 2017-09-06 PROCEDURE — 93017 CV STRESS TEST TRACING ONLY: CPT

## 2017-09-06 PROCEDURE — 93016 CV STRESS TEST SUPVJ ONLY: CPT | Performed by: INTERNAL MEDICINE

## 2017-09-06 PROCEDURE — 93018 CV STRESS TEST I&R ONLY: CPT | Performed by: INTERNAL MEDICINE

## 2017-09-22 ENCOUNTER — TRANSCRIBE ORDERS (OUTPATIENT)
Dept: ADMINISTRATIVE | Facility: HOSPITAL | Age: 67
End: 2017-09-22

## 2017-09-22 DIAGNOSIS — I71.40 ABDOMINAL AORTIC ANEURYSM (AAA) WITHOUT RUPTURE (HCC): Primary | ICD-10-CM

## 2017-11-07 ENCOUNTER — APPOINTMENT (OUTPATIENT)
Dept: CT IMAGING | Facility: HOSPITAL | Age: 67
End: 2017-11-07
Attending: INTERNAL MEDICINE

## 2017-11-10 ENCOUNTER — TRANSCRIBE ORDERS (OUTPATIENT)
Dept: ADMINISTRATIVE | Facility: HOSPITAL | Age: 67
End: 2017-11-10

## 2017-11-10 DIAGNOSIS — R91.8 PULMONARY NODULES: Primary | ICD-10-CM

## 2017-11-22 ENCOUNTER — HOSPITAL ENCOUNTER (OUTPATIENT)
Dept: CT IMAGING | Facility: HOSPITAL | Age: 67
Discharge: HOME | End: 2017-11-22
Attending: INTERNAL MEDICINE | Admitting: INTERNAL MEDICINE

## 2017-11-22 DIAGNOSIS — R91.1 PULMONARY NODULE: ICD-10-CM

## 2017-11-22 PROCEDURE — 71250 CT THORAX DX C-: CPT

## 2017-12-05 ENCOUNTER — TELEPHONE (OUTPATIENT)
Dept: CARDIOLOGY | Facility: CLINIC | Age: 67
End: 2017-12-05

## 2017-12-07 ENCOUNTER — TRANSCRIBE ORDERS (OUTPATIENT)
Dept: ADMINISTRATIVE | Facility: HOSPITAL | Age: 67
End: 2017-12-07

## 2017-12-07 DIAGNOSIS — R91.1 LUNG NODULE: Primary | ICD-10-CM

## 2019-03-08 ENCOUNTER — TRANSCRIBE ORDERS (OUTPATIENT)
Dept: ADMINISTRATIVE | Facility: HOSPITAL | Age: 69
End: 2019-03-08

## 2019-03-08 DIAGNOSIS — I71.40 AAA (ABDOMINAL AORTIC ANEURYSM) WITHOUT RUPTURE (HCC): Primary | ICD-10-CM

## 2020-02-14 ENCOUNTER — HOSPITAL ENCOUNTER (OUTPATIENT)
Dept: OTHER | Facility: HOSPITAL | Age: 70
Discharge: HOME | End: 2020-02-14
Attending: NURSE PRACTITIONER

## 2020-02-28 ENCOUNTER — HOSPITAL ENCOUNTER (OUTPATIENT)
Dept: OTHER | Facility: HOSPITAL | Age: 70
Discharge: HOME | End: 2020-02-28
Attending: NURSE PRACTITIONER

## 2020-05-20 ENCOUNTER — OFFICE VISIT CONVERTED (OUTPATIENT)
Dept: CARDIOLOGY | Facility: CLINIC | Age: 70
End: 2020-05-20
Attending: INTERNAL MEDICINE

## 2021-05-10 NOTE — H&P
History and Physical      Patient Name: Narciso Constantino   Patient ID: [id number]   Sex: Male   YOB: 1950    Primary Care Provider: Karolyn Wyatt   Referring Provider: Karolyn Wyatt    Visit Date: May 20, 2020    Provider: rFeida Quinones MD   Location: [facility]   Location Address: [address] [place] [address], [facility]   EN Watkins  [id number]   Location Phone: [phone]          Chief Complaint     Evaluation of valvular heart disease.  Pedal edema.       History Of Present Illness  Consult requested by: Karolyn Wyatt   Narciso Constantino is a 69 year old male who complains of pedal edema. He states it had been very severe at one time with both legs were swollen down to below his knees. He feels like it is better, but continues to have swelling. He was told he had valvular heart disease based on an echo in February and comes in for evaluation. Denies any chest pain or pressure. No palpitations. Denies any shortness of breath, swelling, dizziness, syncope, PND, or orthopnea. Does complain of fatigue, he feels like he tires easily but that is long-term and normal for him. He is also newly diabetic and he says he has seen Dr. Carranza for his kidney function.   PAST MEDICAL HISTORY: New onset diabetes; Newly diagnosed kidney disease; Hypertension; Sleep apnea; Arthritis; Circulation problems.   PAST HOSPITALIZATIONS/SURGERIES: He has had an aneurysm repair, he thinks in 2017.   FAMILY HISTORY: Positive for diabetes, hypertension, and heart disease, but he is unclear about what the heart disease was, at what ages, and who they were.   CURRENT MEDICATIONS: Lisinopril 10 mg daily; metoprolol tartrate 50 mg 1/2 b.i.d.; atorvastatin 20 mg daily; amlodipine 10 mg daily; metformin 500 mg 2 tablets b.i.d.; Symbicort; vitamin D 50,000 units weekly; chlorthalidone 25 mg daily; aspirin 81 mg daily. The dosage and frequency of the medications were reviewed with the patient.   CHOLESTEROL  "STATUS: Unknown.   PSYCHOSOCIAL HISTORY: Denies mood changes or depression. He is . Never has any caffeine or alcohol, but he used to smoke 2 to 2.5 packs a day for over 50 years, he stopped in 2019. He does not get any regular exercise.   ALLERGIES: No known drug allergies.       Review of Systems  · Constitutional  o Admits  o : Overall, he feels like his health is good. Positive for fluctuating weights, up and down, 5 or 6 pounds. Positive for fatigue.   · Eyes  o Denies  o : double vision  · HENT  o Admits  o : hearing loss or ringing, chronic sinus problem  o Denies  o : swollen glands in neck  · Cardiovascular  o Admits  o : swelling (feet, ankles, hands)  o Denies  o : chest pain, palpitations (fast, fluttering, or skipping beats), shortness of breath while walking or lying flat  · Respiratory  o Admits  o : asthma or wheezing, COPD  o Denies  o : shortness of breath  · Gastrointestinal  o Denies  o : ulcers, nausea or vomiting  · Neurologic  o Denies  o : lightheaded or dizzy, stroke, headaches  · Musculoskeletal  o Admits  o : joint pain, back pain  · Endocrine  o Admits  o : diabetes  o Denies  o : thyroid disease, heat or cold intolerance, excessive thirst or urination  · Heme-Lymph  o Denies  o : bleeding or bruising tendency, anemia      Vitals  Date Time BP Position Site L\R Cuff Size HR RR TEMP (F) WT  HT  BMI kg/m2 BSA m2 O2 Sat HC       05/20/2020 10:31 /68 Sitting    64 - R   172lbs 0oz 5'  3\" 30.47 1.86     05/20/2020 10:31 /64 Sitting                     Physical Examination  · Constitutional  o Appearance  o : Awake, alert, in no acute distress, appears confused at times, very hard-of-hearing.   · Eyes  o Conjunctivae  o : Conjunctivae normal.  o Pupils and Irises  o : Grade 1 fundoscopic exam.   · Ears, Nose, Mouth and Throat  o Oral Cavity  o :   § Oral Mucosa  § : Normal oral mucosa.  · Neck  o Inspection/Palpation  o : No JVD. No bruits noted. No lymphadenopathy. Good " carotid upstroke.  · Respiratory  o Respiratory  o : Increased AP diameter with diminished breath sounds. Prolonged expiration. Negative rales or rhonchi. Fair respiratory effort.  · Cardiovascular  o Heart  o : PMI displaced. S1, S2 regular. No S3. No S4. 2/6 systolic murmur at the base heard throughout the precordium and a 1/6 diastolic murmur.   o Peripheral Vascular System  o :   § Extremities  § : +1 pedal edema. Good femoral pulses. Diminished pedal pulses.   · Gastrointestinal  o Abdominal Examination  o : Soft. No masses or tenderness felt. No hepatosplenomegaly. Abdominal aorta is not palpable.  · Musculoskeletal  o General  o : Muscle strength is normal with normal tone.  · Skin and Subcutaneous Tissue  o General Inspection  o : Within normal limits.  · EKG  o EKG  o : Performed in the office today.  o Indications  o : Hypertension.  o Results  o : He is in sinus rhythm at a rate of 57 with prolonged PA interval.   o Comparison  o : No previous EKG to compare to.   · Labs  o Labs  o : In February, BUN 34, creatinine 2.2.  · Echocardiogram  o Echocardiogram  o : Echo from 02/21/2020 was reviewed and indicated mild-to-moderate aortic regurgitation and fibrocalcific mitral and aortic valves.          Assessment     1.  Pedal edema.  2.  Valvular heart disease with aortic regurgitation.  3.  Hypertension, needs tighter control.  4.  Chronic kidney disease, stage 4 with last labs in February.    5.  Hyperlipidemia.  6.  Diabetes mellitus.       Plan     1.  Decrease amlodipine to 5 mg a day.  2.  Start hydralazine 25 mg b.i.d. for tighter control of his blood pressure.  3.  Restart Lasix 40 mg every other day in view of his swelling.  4.  Check a lipid, CMP, and BrNP in two weeks.  5   Will reconsider repeating his echocardiogram in about 6 months.  6.  Follow up in 3 months with labs.       FLORENTIN Acuna/Freida Quinones MD, FACC  JF/PM/arun             Electronically Signed by: Brandy [facility]  , Other -Author on June 1, 2020 09:37:16 AM  Electronically Co-signed by: FLORENTIN Matamoros -Reviewer on June 1, 2020 10:19:28 AM  Electronically Co-signed by: Freida Quinones MD -Reviewer on Heather 3, 2020 12:45:24 PM

## 2021-05-15 VITALS
WEIGHT: 172 LBS | SYSTOLIC BLOOD PRESSURE: 168 MMHG | BODY MASS INDEX: 30.48 KG/M2 | HEART RATE: 64 BPM | DIASTOLIC BLOOD PRESSURE: 68 MMHG | HEIGHT: 63 IN

## 2021-07-13 ENCOUNTER — HOSPITAL ENCOUNTER (INPATIENT)
Facility: HOSPITAL | Age: 71
LOS: 3 days | Discharge: HOME | End: 2021-07-16
Attending: EMERGENCY MEDICINE | Admitting: INTERNAL MEDICINE

## 2021-07-13 ENCOUNTER — APPOINTMENT (OUTPATIENT)
Dept: GENERAL RADIOLOGY | Facility: HOSPITAL | Age: 71
End: 2021-07-13

## 2021-07-13 DIAGNOSIS — E87.5 HYPERKALEMIA: ICD-10-CM

## 2021-07-13 DIAGNOSIS — E86.0 DEHYDRATION: ICD-10-CM

## 2021-07-13 DIAGNOSIS — R00.1 SINUS BRADYCARDIA: ICD-10-CM

## 2021-07-13 DIAGNOSIS — N17.9 ACUTE KIDNEY INJURY (HCC): Primary | ICD-10-CM

## 2021-07-13 LAB
ALBUMIN SERPL-MCNC: 4.6 G/DL (ref 3.5–5.2)
ALBUMIN/GLOB SERPL: 1.8 G/DL
ALP SERPL-CCNC: 73 U/L (ref 39–117)
ALT SERPL W P-5'-P-CCNC: 29 U/L (ref 1–41)
ANION GAP SERPL CALCULATED.3IONS-SCNC: 12.6 MMOL/L (ref 5–15)
ANION GAP SERPL CALCULATED.3IONS-SCNC: 9.8 MMOL/L (ref 5–15)
APTT PPP: 21.6 SECONDS (ref 22.2–34.2)
AST SERPL-CCNC: 21 U/L (ref 1–40)
BACTERIA UR QL AUTO: ABNORMAL /HPF
BASE EXCESS BLDV CALC-SCNC: -8.7 MMOL/L (ref -2–2)
BASOPHILS # BLD AUTO: 0.07 10*3/MM3 (ref 0–0.2)
BASOPHILS NFR BLD AUTO: 0.7 % (ref 0–1.5)
BDY SITE: ABNORMAL
BILIRUB SERPL-MCNC: 0.4 MG/DL (ref 0–1.2)
BILIRUB UR QL STRIP: NEGATIVE
BUN SERPL-MCNC: 55 MG/DL (ref 8–23)
BUN SERPL-MCNC: 60 MG/DL (ref 8–23)
BUN/CREAT SERPL: 19.6 (ref 7–25)
BUN/CREAT SERPL: 19.7 (ref 7–25)
CA-I BLDA-SCNC: 1.19 MMOL/L (ref 1.13–1.32)
CALCIUM SPEC-SCNC: 9.1 MG/DL (ref 8.6–10.5)
CALCIUM SPEC-SCNC: 9.4 MG/DL (ref 8.6–10.5)
CHLORIDE BLDV-SCNC: 109 MMOL/L (ref 98–106)
CHLORIDE SERPL-SCNC: 109 MMOL/L (ref 98–107)
CHLORIDE SERPL-SCNC: 112 MMOL/L (ref 98–107)
CLARITY UR: CLEAR
CO2 SERPL-SCNC: 18.2 MMOL/L (ref 22–29)
CO2 SERPL-SCNC: 18.4 MMOL/L (ref 22–29)
COHGB MFR BLD: 1.4 % (ref 0–1.5)
COLOR UR: YELLOW
CREAT SERPL-MCNC: 2.8 MG/DL (ref 0.76–1.27)
CREAT SERPL-MCNC: 3.05 MG/DL (ref 0.76–1.27)
DEPRECATED RDW RBC AUTO: 50.8 FL (ref 37–54)
EOSINOPHIL # BLD AUTO: 0.94 10*3/MM3 (ref 0–0.4)
EOSINOPHIL NFR BLD AUTO: 8.8 % (ref 0.3–6.2)
ERYTHROCYTE [DISTWIDTH] IN BLOOD BY AUTOMATED COUNT: 14.8 % (ref 12.3–15.4)
FHHB: 25.2 % (ref 0–5)
GAS FLOW AIRWAY: 2 LPM
GFR SERPL CREATININE-BSD FRML MDRD: 20 ML/MIN/1.73
GFR SERPL CREATININE-BSD FRML MDRD: 22 ML/MIN/1.73
GFR SERPL CREATININE-BSD FRML MDRD: 25 ML/MIN/1.73
GLOBULIN UR ELPH-MCNC: 2.6 GM/DL
GLUCOSE BLDA-MCNC: 90 MMOL/L (ref 70–99)
GLUCOSE SERPL-MCNC: 106 MG/DL (ref 65–99)
GLUCOSE SERPL-MCNC: 107 MG/DL (ref 65–99)
GLUCOSE UR STRIP-MCNC: NEGATIVE MG/DL
HCO3 BLDV-SCNC: 16.7 MMOL/L (ref 22–26)
HCT VFR BLD AUTO: 40.7 % (ref 37.5–51)
HGB BLD-MCNC: 12.5 G/DL (ref 13–17.7)
HGB BLDA-MCNC: 13.1 G/DL (ref 13.8–16.4)
HGB UR QL STRIP.AUTO: NEGATIVE
HOLD SPECIMEN: NORMAL
HOLD SPECIMEN: NORMAL
HYALINE CASTS UR QL AUTO: ABNORMAL /LPF
IMM GRANULOCYTES # BLD AUTO: 0.05 10*3/MM3 (ref 0–0.05)
IMM GRANULOCYTES NFR BLD AUTO: 0.5 % (ref 0–0.5)
INHALED O2 CONCENTRATION: ABNORMAL %
INR PPP: 0.99 (ref 2–3)
KETONES UR QL STRIP: NEGATIVE
LACTATE BLDA-SCNC: 1.32 MMOL/L (ref 0.5–2)
LEUKOCYTE ESTERASE UR QL STRIP.AUTO: ABNORMAL
LYMPHOCYTES # BLD AUTO: 1.86 10*3/MM3 (ref 0.7–3.1)
LYMPHOCYTES NFR BLD AUTO: 17.4 % (ref 19.6–45.3)
MAGNESIUM SERPL-MCNC: 2.2 MG/DL (ref 1.6–2.4)
MCH RBC QN AUTO: 28.6 PG (ref 26.6–33)
MCHC RBC AUTO-ENTMCNC: 30.7 G/DL (ref 31.5–35.7)
MCV RBC AUTO: 93.1 FL (ref 79–97)
METHGB BLD QL: 0.2 % (ref 0–1.5)
MODALITY: ABNORMAL
MONOCYTES # BLD AUTO: 1.06 10*3/MM3 (ref 0.1–0.9)
MONOCYTES NFR BLD AUTO: 9.9 % (ref 5–12)
NEUTROPHILS NFR BLD AUTO: 6.69 10*3/MM3 (ref 1.7–7)
NEUTROPHILS NFR BLD AUTO: 62.7 % (ref 42.7–76)
NITRITE UR QL STRIP: NEGATIVE
NOTE: ABNORMAL
NRBC BLD AUTO-RTO: 0 /100 WBC (ref 0–0.2)
NT-PROBNP SERPL-MCNC: 1818 PG/ML (ref 0–900)
OXYHGB MFR BLDV: 73.2 % (ref 94–99)
PCO2 BLDV: 34.7 MM HG (ref 41–51)
PH BLDV: 7.3 PH UNITS (ref 7.31–7.41)
PH UR STRIP.AUTO: 6 [PH] (ref 5–8)
PLATELET # BLD AUTO: 213 10*3/MM3 (ref 140–450)
PMV BLD AUTO: 10.9 FL (ref 6–12)
PO2 BLDV: 42.3 MM HG (ref 35–42)
POTASSIUM BLDV-SCNC: 7.5 MMOL/L (ref 3.5–5)
POTASSIUM SERPL-SCNC: 5.6 MMOL/L (ref 3.5–5.2)
POTASSIUM SERPL-SCNC: 7.2 MMOL/L (ref 3.5–5.2)
PROT SERPL-MCNC: 7.2 G/DL (ref 6–8.5)
PROT UR QL STRIP: NEGATIVE
PROTHROMBIN TIME: 10.9 SECONDS (ref 9.4–12)
RBC # BLD AUTO: 4.37 10*6/MM3 (ref 4.14–5.8)
RBC # UR: ABNORMAL /HPF
REF LAB TEST METHOD: ABNORMAL
SAO2 % BLDCOV: 74.4 % (ref 95–99)
SODIUM BLDV-SCNC: 137 MMOL/L (ref 136–146)
SODIUM SERPL-SCNC: 137 MMOL/L (ref 136–145)
SODIUM SERPL-SCNC: 143 MMOL/L (ref 136–145)
SP GR UR STRIP: 1.01 (ref 1–1.03)
SQUAMOUS #/AREA URNS HPF: ABNORMAL /HPF
T4 FREE SERPL-MCNC: 1.29 NG/DL (ref 0.93–1.7)
TROPONIN T SERPL-MCNC: 0.02 NG/ML (ref 0–0.03)
TSH SERPL DL<=0.05 MIU/L-ACNC: 2.36 UIU/ML (ref 0.27–4.2)
UROBILINOGEN UR QL STRIP: ABNORMAL
WBC # BLD AUTO: 10.67 10*3/MM3 (ref 3.4–10.8)
WBC UR QL AUTO: ABNORMAL /HPF
WHOLE BLOOD HOLD SPECIMEN: NORMAL

## 2021-07-13 PROCEDURE — 83735 ASSAY OF MAGNESIUM: CPT | Performed by: EMERGENCY MEDICINE

## 2021-07-13 PROCEDURE — 63710000001 INSULIN REGULAR HUMAN PER 5 UNITS: Performed by: EMERGENCY MEDICINE

## 2021-07-13 PROCEDURE — 94640 AIRWAY INHALATION TREATMENT: CPT

## 2021-07-13 PROCEDURE — 85730 THROMBOPLASTIN TIME PARTIAL: CPT | Performed by: EMERGENCY MEDICINE

## 2021-07-13 PROCEDURE — 85025 COMPLETE CBC W/AUTO DIFF WBC: CPT | Performed by: EMERGENCY MEDICINE

## 2021-07-13 PROCEDURE — 84439 ASSAY OF FREE THYROXINE: CPT | Performed by: EMERGENCY MEDICINE

## 2021-07-13 PROCEDURE — 93005 ELECTROCARDIOGRAM TRACING: CPT | Performed by: EMERGENCY MEDICINE

## 2021-07-13 PROCEDURE — 25010000002 CALCIUM GLUCONATE-NACL 1-0.675 GM/50ML-% SOLUTION: Performed by: EMERGENCY MEDICINE

## 2021-07-13 PROCEDURE — 82820 HEMOGLOBIN-OXYGEN AFFINITY: CPT | Performed by: EMERGENCY MEDICINE

## 2021-07-13 PROCEDURE — 84484 ASSAY OF TROPONIN QUANT: CPT | Performed by: EMERGENCY MEDICINE

## 2021-07-13 PROCEDURE — 82805 BLOOD GASES W/O2 SATURATION: CPT | Performed by: EMERGENCY MEDICINE

## 2021-07-13 PROCEDURE — 93010 ELECTROCARDIOGRAM REPORT: CPT | Performed by: SPECIALIST

## 2021-07-13 PROCEDURE — 85610 PROTHROMBIN TIME: CPT | Performed by: EMERGENCY MEDICINE

## 2021-07-13 PROCEDURE — 81001 URINALYSIS AUTO W/SCOPE: CPT | Performed by: EMERGENCY MEDICINE

## 2021-07-13 PROCEDURE — 80053 COMPREHEN METABOLIC PANEL: CPT | Performed by: EMERGENCY MEDICINE

## 2021-07-13 PROCEDURE — 71045 X-RAY EXAM CHEST 1 VIEW: CPT

## 2021-07-13 PROCEDURE — 99223 1ST HOSP IP/OBS HIGH 75: CPT | Performed by: FAMILY MEDICINE

## 2021-07-13 PROCEDURE — 83880 ASSAY OF NATRIURETIC PEPTIDE: CPT | Performed by: EMERGENCY MEDICINE

## 2021-07-13 PROCEDURE — 99285 EMERGENCY DEPT VISIT HI MDM: CPT

## 2021-07-13 PROCEDURE — 84443 ASSAY THYROID STIM HORMONE: CPT | Performed by: EMERGENCY MEDICINE

## 2021-07-13 RX ORDER — ACETAMINOPHEN 650 MG/1
650 SUPPOSITORY RECTAL EVERY 4 HOURS PRN
Status: DISCONTINUED | OUTPATIENT
Start: 2021-07-13 | End: 2021-07-16 | Stop reason: HOSPADM

## 2021-07-13 RX ORDER — DEXTROSE MONOHYDRATE 25 G/50ML
50 INJECTION, SOLUTION INTRAVENOUS ONCE
Status: COMPLETED | OUTPATIENT
Start: 2021-07-13 | End: 2021-07-13

## 2021-07-13 RX ORDER — ACETAMINOPHEN 160 MG/5ML
650 SOLUTION ORAL EVERY 4 HOURS PRN
Status: DISCONTINUED | OUTPATIENT
Start: 2021-07-13 | End: 2021-07-16 | Stop reason: HOSPADM

## 2021-07-13 RX ORDER — BISACODYL 5 MG/1
5 TABLET, DELAYED RELEASE ORAL DAILY PRN
Status: DISCONTINUED | OUTPATIENT
Start: 2021-07-13 | End: 2021-07-16 | Stop reason: HOSPADM

## 2021-07-13 RX ORDER — AMOXICILLIN 250 MG
2 CAPSULE ORAL 2 TIMES DAILY
Status: DISCONTINUED | OUTPATIENT
Start: 2021-07-14 | End: 2021-07-16 | Stop reason: HOSPADM

## 2021-07-13 RX ORDER — ONDANSETRON 2 MG/ML
4 INJECTION INTRAMUSCULAR; INTRAVENOUS EVERY 6 HOURS PRN
Status: DISCONTINUED | OUTPATIENT
Start: 2021-07-13 | End: 2021-07-16 | Stop reason: HOSPADM

## 2021-07-13 RX ORDER — HEPARIN SODIUM 5000 [USP'U]/ML
5000 INJECTION, SOLUTION INTRAVENOUS; SUBCUTANEOUS EVERY 12 HOURS SCHEDULED
Status: DISCONTINUED | OUTPATIENT
Start: 2021-07-14 | End: 2021-07-16 | Stop reason: HOSPADM

## 2021-07-13 RX ORDER — SODIUM CHLORIDE 0.9 % (FLUSH) 0.9 %
10 SYRINGE (ML) INJECTION AS NEEDED
Status: DISCONTINUED | OUTPATIENT
Start: 2021-07-13 | End: 2021-07-16 | Stop reason: HOSPADM

## 2021-07-13 RX ORDER — BISACODYL 10 MG
10 SUPPOSITORY, RECTAL RECTAL DAILY PRN
Status: DISCONTINUED | OUTPATIENT
Start: 2021-07-13 | End: 2021-07-16 | Stop reason: HOSPADM

## 2021-07-13 RX ORDER — CALCIUM GLUCONATE 20 MG/ML
1 INJECTION, SOLUTION INTRAVENOUS ONCE
Status: COMPLETED | OUTPATIENT
Start: 2021-07-13 | End: 2021-07-13

## 2021-07-13 RX ORDER — ACETAMINOPHEN 325 MG/1
650 TABLET ORAL EVERY 4 HOURS PRN
Status: DISCONTINUED | OUTPATIENT
Start: 2021-07-13 | End: 2021-07-16 | Stop reason: HOSPADM

## 2021-07-13 RX ORDER — ALBUTEROL SULFATE 2.5 MG/3ML
10 SOLUTION RESPIRATORY (INHALATION) ONCE
Status: COMPLETED | OUTPATIENT
Start: 2021-07-13 | End: 2021-07-13

## 2021-07-13 RX ORDER — SODIUM CHLORIDE 9 MG/ML
100 INJECTION, SOLUTION INTRAVENOUS CONTINUOUS
Status: DISCONTINUED | OUTPATIENT
Start: 2021-07-14 | End: 2021-07-15

## 2021-07-13 RX ORDER — POLYETHYLENE GLYCOL 3350 17 G/17G
17 POWDER, FOR SOLUTION ORAL DAILY PRN
Status: DISCONTINUED | OUTPATIENT
Start: 2021-07-13 | End: 2021-07-16 | Stop reason: HOSPADM

## 2021-07-13 RX ADMIN — SODIUM CHLORIDE 500 ML: 9 INJECTION, SOLUTION INTRAVENOUS at 20:08

## 2021-07-13 RX ADMIN — INSULIN HUMAN 10 UNITS: 100 INJECTION, SOLUTION PARENTERAL at 19:59

## 2021-07-13 RX ADMIN — SODIUM BICARBONATE 50 MEQ: 84 INJECTION, SOLUTION INTRAVENOUS at 20:02

## 2021-07-13 RX ADMIN — ALBUTEROL SULFATE 10 MG: 5 SOLUTION RESPIRATORY (INHALATION) at 20:11

## 2021-07-13 RX ADMIN — DEXTROSE MONOHYDRATE 50 ML: 25 INJECTION, SOLUTION INTRAVENOUS at 19:55

## 2021-07-13 RX ADMIN — CALCIUM GLUCONATE 1 G: 20 INJECTION, SOLUTION INTRAVENOUS at 19:50

## 2021-07-13 NOTE — ED PROVIDER NOTES
Time: 6:29 PM EDT  Arrived by: EMS  Chief Complaint:   Chief Complaint   Patient presents with   • Slow Heart Rate   • Dizziness   • Diarrhea     History provided by: patient  History is limited by: N/A     History of Present Illness:  Patient is a 71 y.o. year old male that presents to the emergency department with generalized weakness. Pt also notes he's had dark stools and has been taking Pepto Bismol. Pt fell 2-3 days ago, but denies any injury. Pt was seen in the office today and they sent to ED for further evaluation.     Pt is on Metoprolol and denies taking any extra pills. Pt states he hasn't been taking his water pills recently. Pt is a diabetic. Pt had an aneurysm 2 years ago.   Pt isn't on home oxygen.       Dizziness  Quality:  Imbalance  Severity:  Moderate  Onset quality:  Gradual  Duration:  2 days  Timing:  Constant  Progression:  Unchanged  Chronicity:  New  Relieved by:  Nothing  Worsened by:  Nothing  Ineffective treatments:  None tried  Associated symptoms: weakness (generalized)    Associated symptoms: no chest pain, no diarrhea, no shortness of breath and no vomiting        Similar Symptoms Previously: yes  Recently seen: yes      Patient Care Team  Primary Care Provider:     Past Medical History:     No Known Allergies  Past Medical History:   Diagnosis Date   • AAA (abdominal aortic aneurysm) (CMS/[facility]) 2017    REPAIRED   • Chest pain    • Chronic bronchitis (CMS/[facility])    • COPD (chronic obstructive pulmonary disease) (CMS/[facility])    • Coronary artery disease    • Dyspnea    • Hypertension      Past Surgical History:   Procedure Laterality Date   • ABDOMINAL AORTIC ANEURYSM REPAIR, AORTIC BIFEMORAL ILIAC RENAL BYPASS N/A 7/18/2017    Procedure: BILATERAL FEMORAL CUT DOWN, AORTIC ILIOFEMORAL ARTERIOGRAM, BILATERAL ARTERY ANGIOPLASTY, GORE STENT GRAFT REPAIR OF RUPTURED AORTIC ANEURYSM;  Surgeon: Guevara Parisi MD;  Location: [facility] OR 18/19;  Service:    • CARDIAC  CATHETERIZATION Left 7/27/2017    Procedure: Cardiac Catheterization/Vascular Study- radial approach only;  Surgeon: Ervin Kimbrough MD;  Location: [facility] CATH INVASIVE LOCATION;  Service:    • CARDIAC CATHETERIZATION N/A 7/27/2017    Procedure: Left Heart Cath;  Surgeon: Ervin Kimbrough MD;  Location:  [facility] CATH INVASIVE LOCATION;  Service:    • CARDIAC CATHETERIZATION N/A 7/27/2017    Procedure: Left ventriculography;  Surgeon: Ervin Kimbrough MD;  Location: [facility] CATH [facility]VASIVE LOC[facility];  Service:    • CARDIAC CATHETERIZATION N/A 7/27/2017    Procedure: Coronary angiography;  Surgeon: Ervin Kimbrough MD;  Location:  [facility] CATH INVASIVE LOCATION;  Service:    • CARDIAC CATHETERIZATION N/A 7/27/2017    Procedure: Angioplasty-coronary;  Surgeon: Ervin Kimbrough MD;  Location[facility] C[facility];  Service:    • CARDIAC CATHETERIZATION N/A 8/9/2017    Procedure: Stent LUISANA coronary;  Surgeon: Ervin Kimbrough MD;  Location[facility] C[facility] LOC[facility];  Service:    • SD RT/LT HEART CATHETERS N/A 8/9/2017    Procedure: Percutaneous Coronary Intervention LAD;  Surgeon: Ervin Kimbrough MD;  Location: [facility] CATH INVASIVE LOCATION;  Service: Cardiovascular     Family History   Problem Relation Age of Onset   • No Known Problems Mother    • No Known Problems Father        Home Medications:  Prior to Admission medications    Medication Sig Start Date End Date Taking? Authorizing Provider   albuterol (PROVENTIL HFA) 108 (90 BASE) MCG/ACT inhaler Inhale 2 puffs Every 4 (Four) Hours As Needed for Wheezing.    Provider, MD Allen   amLODIPine (NORVASC) 10 MG tablet Take 1 tablet by mouth Daily. 9/1/17   Heidi Gomez MD   aspirin 81 MG chewable tablet Chew 1 tablet Daily. 8/9/17   Ervin Kimbrough MD   atorvastatin (LIPITOR) 20 MG tablet Take 1 tablet by mouth Every Night. 8/16/17   Steph Ford APRN   budesonide-formoterol (SYMBICORT) 160-4.5 MCG/ACT inhaler Inhale 2 puffs 2 (Two) Times a Day.    Provider,  "MD Allen   clopidogrel (PLAVIX) 75 MG tablet Take 1 tablet by mouth Daily. 8/9/17   Ervin Kimbrough MD   metoprolol tartrate (LOPRESSOR) 25 MG tablet Take 1 tablet by mouth 2 (Two) Times a Day. 8/9/17   Ervin Kimbrough MD   Umeclidinium-Vilanterol (ANORO ELLIPTA) 62.5-25 MCG/INH aerosol powder  Inhale.    Provider, MD Allen        Social History:   Social History     Tobacco Use   • Smoking status: Former Smoker     Packs/day: 2.00     Years: 50.00     Pack years: 100.00     Types: Cigarettes     Quit date: 7/18/2017     Years since quitting: 3.9   Substance Use Topics   • Alcohol use: No   • Drug use: No         Review of Systems:  Review of Systems   Constitutional: Negative for chills and fever.   HENT: Negative for nosebleeds.    Eyes: Negative for redness.   Respiratory: Negative for cough and shortness of breath.    Cardiovascular: Negative for chest pain.   Gastrointestinal: Negative for diarrhea and vomiting.   Genitourinary: Negative for dysuria and frequency.   Musculoskeletal: Negative for back pain and neck pain.   Skin: Negative for rash.   Neurological: Positive for dizziness (imbalance) and weakness (generalized). Negative for seizures.        Physical Exam:  /55   Pulse 73   Temp 98.6 °F (37 °C)   Resp 20   Ht 160 cm (63\")   Wt 61.7 kg (136 lb 0.4 oz)   SpO2 96%   BMI 24.10 kg/m²     Physical Exam  Vitals and nursing note reviewed.   Constitutional:       General: He is awake. He is not in acute distress.     Appearance: Normal appearance.      Comments: Vague historian.    HENT:      Head: Normocephalic and atraumatic.      Nose: Nose normal.      Mouth/Throat:      Pharynx: Oropharynx is clear.   Eyes:      General: No scleral icterus.     Conjunctiva/sclera: Conjunctivae normal.      Pupils: Pupils are equal, round, and reactive to light.   Cardiovascular:      Rate and Rhythm: Regular rhythm. Bradycardia present.      Pulses:           Carotid pulses are 1+ on the right " side and 1+ on the left side.       Radial pulses are 1+ on the right side and 1+ on the left side.        Femoral pulses are 1+ on the right side and 1+ on the left side.       Popliteal pulses are 1+ on the right side and 1+ on the left side.        Dorsalis pedis pulses are 1+ on the right side and 1+ on the left side.        Posterior tibial pulses are 1+ on the right side and 1+ on the left side.      Heart sounds: Normal heart sounds. No murmur heard.     Pulmonary:      Effort: Pulmonary effort is normal. No respiratory distress.      Breath sounds: Decreased air movement (diminished) present. Examination of the right-upper field reveals wheezing. Examination of the left-upper field reveals wheezing. Examination of the right-middle field reveals wheezing. Examination of the left-middle field reveals wheezing. Examination of the right-lower field reveals wheezing. Examination of the left-lower field reveals wheezing. Wheezing (diffuse) present. No rhonchi or rales.   Chest:      Chest wall: No tenderness.   Abdominal:      General: There is distension.      Palpations: Abdomen is soft.      Tenderness: There is no abdominal tenderness. There is no guarding or rebound.      Hernia: No hernia is present.      Comments: No rigidity.   Musculoskeletal:         General: No tenderness. Normal range of motion.      Cervical back: Normal range of motion and neck supple. No tenderness.      Right lower leg: Pitting Edema (trace in leg and ankle) present.      Left lower leg: Pitting Edema (trace in leg and ankle) present.   Skin:     General: Skin is warm and dry.   Neurological:      General: No focal deficit present.      Mental Status: He is alert and oriented to person, place, and time.      Sensory: Sensation is intact. No sensory deficit.      Motor: Motor function is intact. No weakness.   Psychiatric:         Mood and Affect: Mood normal.         Behavior: Behavior normal.                Medications in the  Emergency Department:  Medications   sodium chloride 0.9 % flush 10 mL (has no administration in time range)   dextrose (D50W) 25 g/ 50mL Intravenous Solution 50 mL (50 mL Intravenous Given 7/13/21 1955)   insulin regular (humuLIN R,novoLIN R) injection 10 Units (10 Units Intravenous Given 7/13/21 1959)   sodium bicarbonate injection 8.4% 50 mEq (50 mEq Intravenous Given 7/13/21 2002)   calcium gluconate 1g/50ml 0.675% NaCl IV SOLN (0 g Intravenous Stopped 7/13/21 1955)   albuterol (PROVENTIL) nebulizer solution 0.083% 2.5 mg/3mL (10 mg Nebulization Given 7/13/21 2011)   sodium chloride 0.9 % bolus 500 mL (500 mL Intravenous New Bag 7/13/21 2008)        Labs  Lab Results (last 24 hours)     Procedure Component Value Units Date/Time    CBC & Differential [[id number]]  (Abnormal) Collected: 07/13/21 1839    Specimen: Blood Updated: 07/13/21 1858    Narrative:      The following orders were created for panel order CBC & Differential.  Procedure                               Abnormality         Status                     ---------                               -----------         ------                     CBC Auto Differential[[id number]]        Abnormal            Final result                 Please view results for these tests on the individual orders.    Comprehensive Metabolic Panel [[id number]]  (Abnormal) Collected: 07/13/21 1839    Specimen: Blood Updated: 07/13/21 1934     Glucose 106 mg/dL      BUN 60 mg/dL      Creatinine 3.05 mg/dL      Sodium 137 mmol/L      Potassium 7.2 mmol/L      Chloride 109 mmol/L      CO2 18.2 mmol/L      Calcium 9.1 mg/dL      Total Protein 7.2 g/dL      Albumin 4.60 g/dL      ALT (SGPT) 29 U/L      AST (SGOT) 21 U/L      Alkaline Phosphatase 73 U/L      Total Bilirubin 0.4 mg/dL      eGFR Non African Amer 20 mL/min/1.73      eGFR  African Amer 25 mL/min/1.73      Globulin 2.6 gm/dL      A/G Ratio 1.8 g/dL      BUN/Creatinine Ratio 19.7     Anion Gap 9.8 mmol/L     Narrative:      GFR  Normal >60  Chronic Kidney Disease <60  Kidney Failure <15      Troponin [[id number]]  (Normal) Collected: 07/13/21 1839    Specimen: Blood Updated: 07/13/21 1926     Troponin T 0.021 ng/mL     Narrative:      Troponin T Reference Range:  <= 0.03 ng/mL-   Negative for AMI  >0.03 ng/mL-     Abnormal for myocardial necrosis.  Clinicians would have to utilize clinical acumen, EKG, Troponin and serial changes to determine if it is an Acute Myocardial Infarction or myocardial injury due to an underlying chronic condition.       Results may be falsely decreased if patient taking Biotin.      Magnesium [[id number]]  (Normal) Collected: 07/13/21 1839    Specimen: Blood Updated: 07/13/21 1929     Magnesium 2.2 mg/dL     CBC Auto Differential [[id number]]  (Abnormal) Collected: 07/13/21 1839    Specimen: Blood Updated: 07/13/21 1858     WBC 10.67 10*3/mm3      RBC 4.37 10*6/mm3      Hemoglobin 12.5 g/dL      Hematocrit 40.7 %      MCV 93.1 fL      MCH 28.6 pg      MCHC 30.7 g/dL      RDW 14.8 %      RDW-SD 50.8 fl      MPV 10.9 fL      Platelets 213 10*3/mm3      Neutrophil % 62.7 %      Lymphocyte % 17.4 %      Monocyte % 9.9 %      Eosinophil % 8.8 %      Basophil % 0.7 %      Immature Grans % 0.5 %      Neutrophils, Absolute 6.69 10*3/mm3      Lymphocytes, Absolute 1.86 10*3/mm3      Monocytes, Absolute 1.06 10*3/mm3      Eosinophils, Absolute 0.94 10*3/mm3      Basophils, Absolute 0.07 10*3/mm3      Immature Grans, Absolute 0.05 10*3/mm3      nRBC 0.0 /100 WBC     T4, Free [[id number]]  (Normal) Collected: 07/13/21 1839    Specimen: Blood Updated: 07/13/21 1948     Free T4 1.29 ng/dL     Narrative:      Results may be falsely increased if patient taking Biotin.      TSH [[id number]]  (Normal) Collected: 07/13/21 1839    Specimen: Blood Updated: 07/13/21 1948     TSH 2.360 uIU/mL     BNP [[id number]]  (Abnormal) Collected: 07/13/21 1839    Specimen: Blood Updated: 07/13/21 1948     proBNP 1,818.0 pg/mL     Narrative:      Among  patients with dyspnea, NT-proBNP is highly sensitive for the detection of acute congestive heart failure. In addition NT-proBNP of <300 pg/ml effectively rules out acute congestive heart failure with 99% negative predictive value.    Results may be falsely decreased if patient taking Biotin.      VBG with Co-Ox and Electrolytes [[id number]]  (Abnormal) Collected: 07/13/21 1927    Specimen: Venous Blood Updated: 07/13/21 1935     pH, Venous 7.301 pH Units      pCO2, Venous 34.7 mm Hg      pO2, Venous 42.3 mm Hg      HCO3, Venous 16.7 mmol/L      Base Excess, Venous -8.7 mmol/L      O2 Saturation, Venous 74.4 %      Hemoglobin, Blood Gas 13.1 g/dL      Carboxyhemoglobin 1.4 %      Methemoglobin 0.20 %      Oxyhemoglobin 73.2 %      FHHB 25.2 %      Note --     Site Drawn by RN     Modality Nasal Cannula     FIO2 --     Flow Rate 2 lpm      Sodium, Venous 137.0 mmol/L      Potassium, Venous 7.5 mmol/L      Ionized Calcium, Arterial 1.19 mmol/L      Chloride, Venous  109 mmol/L      Glucose, Arterial 90 mmol/L      Lactate, Arterial 1.32 mmol/L     Protime-INR [[id number]]  (Abnormal) Collected: 07/13/21 1927    Specimen: Blood Updated: 07/13/21 1944     Protime 10.9 Seconds      INR 0.99    Narrative:      Suggested Therapeutic Ranges For Oral Anticoagulant Therapy:  Level of Therapy                      INR Target Range  Standard Dose                            2.0-3.0  High Dose                                2.5-3.5  Patients not receiving anticoagulant  Therapy Normal Range                     0.6-1.2    aPTT [[id number]]  (Abnormal) Collected: 07/13/21 1927    Specimen: Blood Updated: 07/13/21 1944     PTT 21.6 seconds            Imaging:  XR Chest 1 View    Result Date: 7/13/2021  PROCEDURE: XR CHEST 1 VW  COMPARISON: [facility], CT, CT LOW DOSE CHEST SCREENING, 2/28/2020, 15:39.  INDICATIONS: DIZZINESS; SHORTNESS OF BREATH; SLOW HEART RATE  FINDINGS:   The lungs are well-expanded. The heart and  pulmonary vasculature are within normal limits. No pleural effusions are identified. There are no active appearing infiltrates.  IMPRESSION: No active disease.  WHITNEY KRISHNAMURTHY MD       Electronically Signed and Approved By: WHITNEY KRISHNAMURTHY MD on 7/13/2021 at 19:13               Procedures:  Procedures    Progress  ED Course as of Jul 13 2054   Tue Jul 13, 2021   1918 EKG:    Rhythm: Sinus bradycardia  Rate: Rate 44  Intervals: No obvious NJ or QT abnormality  Decreased voltage in the extremity leads  T-wave: Nonspecific T wave flattening in I as well as V2 and aVL there could be T wave inversion in aVL  ST Segment: There is no obvious pathological ST elevation or ST depression    EKG Comparison: The EKG appears to have a similar appearance to an EKG performed August 9, 2017.  The patient was bradycardic at that time as well    Interpreted by me        [SD]   1919 FINDINGS:             The lungs are well-expanded. The heart and pulmonary vasculature are within normal limits. No   pleural effusions are identified. There are no active appearing infiltrates.     IMPRESSION: No active disease.   XR Chest 1 View [JW]      ED Course User Index  [JW] Moni Agrawal  [SD] Orestes Alvarado DO                            Medical Decision Making:  MDM  Number of Diagnoses or Management Options  Acute kidney injury (CMS/HCC)  Dehydration  Hyperkalemia  Sinus bradycardia  Diagnosis management comments: The patient's potassium was greater than 7.  This resulted in a symptomatic bradycardia.  The hyper kalemia was treated emergently.  The patient responded well to the insulin glucose, bicarb albuterol and calcium gluconate.  The patient's vital signs were stable at the time of admission.  The patient's heart rate was in the 70s       Amount and/or Complexity of Data Reviewed  Clinical lab tests: reviewed  Tests in the radiology section of CPT®: reviewed  Tests in the medicine section of CPT®: reviewed  Decide to obtain  previous medical records or to obtain history from someone other than the patient: yes    Critical Care  Total time providing critical care: 30-74 minutes (I spent 30 minutes of critical care time with this patient.  That include evaluation of the patient's venous blood gas, looking at old labs, evaluating the EKG and treating the emergent hyperkalemia)       Patient's last Renal Function Panel was performed 3 years ago.     Final diagnoses:   Acute kidney injury (CMS/HCC)   Dehydration   Hyperkalemia   Sinus bradycardia        Disposition:  ED Disposition     ED Disposition Condition Comment    Decision to Admit            Documentation assistance provided by Moni Agrawal acting as scribe for Orestes Alvarado DO. Information recorded by the scribe was done at my direction and has been verified and validated by me.          Moni Agrawal  07/13/21 1918       Moni Agrawal  07/13/21 2022       Orestes Alvarado DO  07/15/21 0134

## 2021-07-14 LAB
ANION GAP SERPL CALCULATED.3IONS-SCNC: 10.7 MMOL/L (ref 5–15)
ANION GAP SERPL CALCULATED.3IONS-SCNC: 11.4 MMOL/L (ref 5–15)
BASOPHILS # BLD AUTO: 0.05 10*3/MM3 (ref 0–0.2)
BASOPHILS NFR BLD AUTO: 0.6 % (ref 0–1.5)
BUN SERPL-MCNC: 39 MG/DL (ref 8–23)
BUN SERPL-MCNC: 51 MG/DL (ref 8–23)
BUN/CREAT SERPL: 19 (ref 7–25)
BUN/CREAT SERPL: 20.7 (ref 7–25)
CALCIUM SPEC-SCNC: 9.1 MG/DL (ref 8.6–10.5)
CALCIUM SPEC-SCNC: 9.5 MG/DL (ref 8.6–10.5)
CHLORIDE SERPL-SCNC: 111 MMOL/L (ref 98–107)
CHLORIDE SERPL-SCNC: 112 MMOL/L (ref 98–107)
CO2 SERPL-SCNC: 18.3 MMOL/L (ref 22–29)
CO2 SERPL-SCNC: 19.6 MMOL/L (ref 22–29)
CREAT SERPL-MCNC: 2.05 MG/DL (ref 0.76–1.27)
CREAT SERPL-MCNC: 2.46 MG/DL (ref 0.76–1.27)
DEPRECATED RDW RBC AUTO: 50.1 FL (ref 37–54)
EOSINOPHIL # BLD AUTO: 0.51 10*3/MM3 (ref 0–0.4)
EOSINOPHIL NFR BLD AUTO: 6.2 % (ref 0.3–6.2)
ERYTHROCYTE [DISTWIDTH] IN BLOOD BY AUTOMATED COUNT: 14.6 % (ref 12.3–15.4)
GFR SERPL CREATININE-BSD FRML MDRD: 26 ML/MIN/1.73
GFR SERPL CREATININE-BSD FRML MDRD: 32 ML/MIN/1.73
GLUCOSE BLDC GLUCOMTR-MCNC: 120 MG/DL (ref 70–130)
GLUCOSE BLDC GLUCOMTR-MCNC: 146 MG/DL (ref 70–130)
GLUCOSE BLDC GLUCOMTR-MCNC: 82 MG/DL (ref 70–130)
GLUCOSE BLDC GLUCOMTR-MCNC: 83 MG/DL (ref 70–130)
GLUCOSE BLDC GLUCOMTR-MCNC: 93 MG/DL (ref 70–130)
GLUCOSE SERPL-MCNC: 112 MG/DL (ref 65–99)
GLUCOSE SERPL-MCNC: 166 MG/DL (ref 65–99)
HCT VFR BLD AUTO: 37.5 % (ref 37.5–51)
HGB BLD-MCNC: 11.7 G/DL (ref 13–17.7)
IMM GRANULOCYTES # BLD AUTO: 0.04 10*3/MM3 (ref 0–0.05)
IMM GRANULOCYTES NFR BLD AUTO: 0.5 % (ref 0–0.5)
LYMPHOCYTES # BLD AUTO: 1.04 10*3/MM3 (ref 0.7–3.1)
LYMPHOCYTES NFR BLD AUTO: 12.7 % (ref 19.6–45.3)
MCH RBC QN AUTO: 28.9 PG (ref 26.6–33)
MCHC RBC AUTO-ENTMCNC: 31.2 G/DL (ref 31.5–35.7)
MCV RBC AUTO: 92.6 FL (ref 79–97)
MONOCYTES # BLD AUTO: 0.85 10*3/MM3 (ref 0.1–0.9)
MONOCYTES NFR BLD AUTO: 10.4 % (ref 5–12)
NEUTROPHILS NFR BLD AUTO: 5.7 10*3/MM3 (ref 1.7–7)
NEUTROPHILS NFR BLD AUTO: 69.6 % (ref 42.7–76)
NRBC BLD AUTO-RTO: 0 /100 WBC (ref 0–0.2)
PLATELET # BLD AUTO: 190 10*3/MM3 (ref 140–450)
PMV BLD AUTO: 10.6 FL (ref 6–12)
POTASSIUM SERPL-SCNC: 5.3 MMOL/L (ref 3.5–5.2)
POTASSIUM SERPL-SCNC: 5.6 MMOL/L (ref 3.5–5.2)
QT INTERVAL: 406 MS
RBC # BLD AUTO: 4.05 10*6/MM3 (ref 4.14–5.8)
SODIUM SERPL-SCNC: 140 MMOL/L (ref 136–145)
SODIUM SERPL-SCNC: 143 MMOL/L (ref 136–145)
WBC # BLD AUTO: 8.19 10*3/MM3 (ref 3.4–10.8)

## 2021-07-14 PROCEDURE — 25010000002 HEPARIN (PORCINE) PER 1000 UNITS: Performed by: FAMILY MEDICINE

## 2021-07-14 PROCEDURE — 94799 UNLISTED PULMONARY SVC/PX: CPT

## 2021-07-14 PROCEDURE — 82962 GLUCOSE BLOOD TEST: CPT

## 2021-07-14 PROCEDURE — 80048 BASIC METABOLIC PNL TOTAL CA: CPT | Performed by: FAMILY MEDICINE

## 2021-07-14 PROCEDURE — 36415 COLL VENOUS BLD VENIPUNCTURE: CPT | Performed by: FAMILY MEDICINE

## 2021-07-14 PROCEDURE — 99233 SBSQ HOSP IP/OBS HIGH 50: CPT | Performed by: INTERNAL MEDICINE

## 2021-07-14 PROCEDURE — 85025 COMPLETE CBC W/AUTO DIFF WBC: CPT | Performed by: FAMILY MEDICINE

## 2021-07-14 PROCEDURE — 80048 BASIC METABOLIC PNL TOTAL CA: CPT | Performed by: INTERNAL MEDICINE

## 2021-07-14 PROCEDURE — 63710000001 INSULIN REGULAR HUMAN PER 5 UNITS: Performed by: FAMILY MEDICINE

## 2021-07-14 RX ORDER — CLOPIDOGREL BISULFATE 75 MG/1
75 TABLET ORAL DAILY
Status: DISCONTINUED | OUTPATIENT
Start: 2021-07-14 | End: 2021-07-16 | Stop reason: HOSPADM

## 2021-07-14 RX ORDER — NICOTINE POLACRILEX 4 MG
15 LOZENGE BUCCAL
Status: DISCONTINUED | OUTPATIENT
Start: 2021-07-14 | End: 2021-07-16 | Stop reason: HOSPADM

## 2021-07-14 RX ORDER — DEXTROSE MONOHYDRATE 100 MG/ML
25 INJECTION, SOLUTION INTRAVENOUS
Status: DISCONTINUED | OUTPATIENT
Start: 2021-07-14 | End: 2021-07-16 | Stop reason: HOSPADM

## 2021-07-14 RX ORDER — CHLORTHALIDONE 25 MG/1
25 TABLET ORAL DAILY
COMMUNITY
End: 2021-07-16 | Stop reason: HOSPADM

## 2021-07-14 RX ORDER — DEXTROSE MONOHYDRATE 25 G/50ML
50 INJECTION, SOLUTION INTRAVENOUS ONCE
Status: COMPLETED | OUTPATIENT
Start: 2021-07-14 | End: 2021-07-14

## 2021-07-14 RX ORDER — ASPIRIN 81 MG/1
81 TABLET, CHEWABLE ORAL DAILY
Status: DISCONTINUED | OUTPATIENT
Start: 2021-07-14 | End: 2021-07-16 | Stop reason: HOSPADM

## 2021-07-14 RX ORDER — ALBUTEROL SULFATE 2.5 MG/3ML
10 SOLUTION RESPIRATORY (INHALATION) ONCE
Status: COMPLETED | OUTPATIENT
Start: 2021-07-14 | End: 2021-07-14

## 2021-07-14 RX ORDER — LISINOPRIL 20 MG/1
10 TABLET ORAL DAILY
COMMUNITY
End: 2021-07-16 | Stop reason: HOSPADM

## 2021-07-14 RX ADMIN — ASPIRIN 81 MG CHEWABLE TABLET 81 MG: 81 TABLET CHEWABLE at 16:42

## 2021-07-14 RX ADMIN — SODIUM CHLORIDE 100 ML/HR: 9 INJECTION, SOLUTION INTRAVENOUS at 02:00

## 2021-07-14 RX ADMIN — CLOPIDOGREL BISULFATE 75 MG: 75 TABLET, FILM COATED ORAL at 16:42

## 2021-07-14 RX ADMIN — DOCUSATE SODIUM 50MG AND SENNOSIDES 8.6MG 2 TABLET: 8.6; 5 TABLET, FILM COATED ORAL at 08:49

## 2021-07-14 RX ADMIN — HEPARIN SODIUM 5000 UNITS: 5000 INJECTION INTRAVENOUS; SUBCUTANEOUS at 20:27

## 2021-07-14 RX ADMIN — ALBUTEROL SULFATE 10 MG: 2.5 SOLUTION RESPIRATORY (INHALATION) at 09:15

## 2021-07-14 RX ADMIN — DEXTROSE MONOHYDRATE 50 ML: 25 INJECTION, SOLUTION INTRAVENOUS at 06:46

## 2021-07-14 RX ADMIN — HEPARIN SODIUM 5000 UNITS: 5000 INJECTION INTRAVENOUS; SUBCUTANEOUS at 02:01

## 2021-07-14 RX ADMIN — SODIUM CHLORIDE 100 ML/HR: 9 INJECTION, SOLUTION INTRAVENOUS at 12:12

## 2021-07-14 RX ADMIN — SODIUM CHLORIDE 100 ML/HR: 9 INJECTION, SOLUTION INTRAVENOUS at 20:27

## 2021-07-14 RX ADMIN — HEPARIN SODIUM 5000 UNITS: 5000 INJECTION INTRAVENOUS; SUBCUTANEOUS at 08:48

## 2021-07-14 RX ADMIN — INSULIN HUMAN 10 UNITS: 100 INJECTION, SOLUTION PARENTERAL at 06:47

## 2021-07-14 NOTE — CONSULTS
Patient Care Team:  Grant Randall MD as PCP - General (Pulmonary Disease)    Chief complaint: LISSETTE/CKD3    Subjective     History of Present Illness  72yo with h/o CKD3 followed by Dr. Carranza in our group.  He was last seen 2/2020.  He has CKD related to previous ruptured AAA 7/2017 at [facility].  Baseline creatinine had been 1.7 at that time.  He had also been treated for DMII and HTN.  He had been at his PCP for weakness and had noted bradycardia and sent to ER.  He had noted hyperkalemia and LISSETTE and was admitted for further treatment.  He has had rapid improvement with medical treatment.  He denies any recent medication changes.      Review of Systems   Constitutional: Negative for appetite change, diaphoresis and fever.   Respiratory: Negative for cough and shortness of breath.    Cardiovascular: Negative for chest pain and leg swelling.   Gastrointestinal: Negative for abdominal distention and abdominal pain.   Endocrine: Negative for polydipsia and polyuria.   Genitourinary: Negative for difficulty urinating and dysuria.   Musculoskeletal: Negative for back pain.        Past Medical History:   Diagnosis Date   • AAA (abdominal aortic aneurysm) (CMS/[facility]) 2017    REPAIRED   • Chest pain    • Chronic bronchitis (CMS/[facility])    • COPD (chronic obstructive pulmonary disease) (CMS/[facility])    • Coronary artery disease    • Dyspnea    • Hypertension    ,   Past Surgical History:   Procedure Laterality Date   • ABDOMINAL AORTIC ANEURYSM REPAIR, AORTIC BIFEMORAL ILIAC RENAL BYPASS N/A 7/18/2017    Procedure: BILATERAL FEMORAL CUT DOWN, AORTIC ILIOFEMORAL ARTERIOGRAM, BILATERAL ARTERY ANGIOPLASTY, GORE STENT GRAFT REPAIR OF RUPTURED AORTIC ANEURYSM;  Surgeon: Guevara Parisi MD;  Location: [facility] OR 18/19;  Service:    • CARDIAC CATHETERIZATION Left 7/27/2017    Procedure: Cardiac Catheterization/Vascular Study- radial approach only;  Surgeon: Ervin Kimbrough MD;  Location: [facility] CATH INVASIVE  LOCATION;  Service:    • CARDIAC CATHETERIZATION N/A 7/27/2017    Procedure: Left Heart Cath;  Surgeon: Ervin Kimbrough MD;  Location: [facility] CATH INVASIVE LOCATION;  Service:    • CARDIAC CATHETERIZATION N/A 7/27/2017    Procedure: Left ventriculography;  Surgeon: Ervin Kimbrough MD;  Location: [facility] CATH INVASIVE LOCATION;  Service:    • CARDIAC CATHETERIZATION N/A 7/27/2017    Procedure: Coronary angiography;  Surgeon: Ervin Kimbrough MD;  Location: [facility] CATH INVASIVE LOCATION;  Service:    • CARDIAC CATHETERIZATION N/A 7/27/2017    Procedure: Angioplasty-coronary;  Surgeon: Ervin Kimbrough MD;  Location: [facility] CATH INVASIVE LOCATION;  Service:    • CARDIAC CATHETERIZATION N/A 8/9/2017    Procedure: Stent LUISANA coronary;  Surgeon: Ervin Kimbrough MD;  Location: [facility] CATH INVASIVE LOCATION;  Service:    • [place] RT/LT HEART CATHETERS N/A 8/9/2017    Procedure: Percutaneous Coronary Intervention LAD;  Surgeon: Ervin Kimbrough MD;  Location: [facility] CATH INVASIVE LOCATION;  Service: Cardiovascular   ,   Family History   Problem Relation Age of Onset   • No Known Problems Mother    • No Known Problems Father    ,   Social History     Socioeconomic History   • Marital status:      Spouse name: Not on file   • Number of children: Not on file   • Years of education: Not on file   • Highest education level: Not on file   Tobacco Use   • Smoking status: Former Smoker     Packs/day: 2.00     Years: 50.00     Pack years: 100.00     Types: Cigarettes     Quit date: 7/18/2017     Years since quitting: 3.9   Vaping Use   • Vaping Use: Never used   Substance and Sexual Activity   • Alcohol use: No   • Drug use: No   • Sexual activity: Defer     E-cigarette/Vaping   • E-cigarette/Vaping Use Never User      E-cigarette/Vaping Substances     E-cigarette/Vaping Devices       ,   Medications Prior to Admission   Medication Sig Dispense Refill Last Dose   • albuterol (PROVENTIL HFA) 108 (90 BASE) MCG/ACT inhaler Inhale 2 puffs  Every 4 (Four) Hours As Needed for Wheezing.   Unknown at Unknown time   • amLODIPine (NORVASC) 10 MG tablet Take 1 tablet by mouth Daily. 30 tablet 6 Unknown at Unknown time   • aspirin 81 MG chewable tablet Chew 1 tablet Daily.  11 Unknown at Unknown time   • atorvastatin (LIPITOR) 20 MG tablet Take 1 tablet by mouth Every Night. 30 tablet 1 Unknown at Unknown time   • budesonide-formoterol (SYMBICORT) 160-4.5 MCG/ACT inhaler Inhale 2 puffs 2 (Two) Times a Day.   Unknown at Unknown time   • chlorthalidone (HYGROTON) 25 MG tablet Take 25 mg by mouth Daily.   Unknown at Unknown time   • clopidogrel (PLAVIX) 75 MG tablet Take 1 tablet by mouth Daily. 90 tablet 3 Unknown at Unknown time   • lisinopril (PRINIVIL,ZESTRIL) 20 MG tablet Take 10 mg by mouth Daily.   Unknown at Unknown time   • metFORMIN (GLUCOPHAGE) 1000 MG tablet Take 1,000 mg by mouth 2 (Two) Times a Day With Meals.   Unknown at Unknown time   • metoprolol tartrate (LOPRESSOR) 25 MG tablet Take 1 tablet by mouth 2 (Two) Times a Day. 60 tablet 3 Unknown at Unknown time   , Scheduled Meds:  aspirin, 81 mg, Oral, Daily  clopidogrel, 75 mg, Oral, Daily  heparin (porcine), 5,000 Units, Subcutaneous, Q12H  insulin lispro, 0-9 Units, Subcutaneous, TID AC  senna-docusate sodium, 2 tablet, Oral, BID    , Continuous Infusions:  sodium chloride, 100 mL/hr, Last Rate: 100 mL/hr (07/14/21 1212)    , PRN Meds:  •  acetaminophen **OR** acetaminophen **OR** acetaminophen  •  senna-docusate sodium **AND** polyethylene glycol **AND** bisacodyl **AND** bisacodyl  •  dextrose  •  dextrose  •  glucagon (human recombinant)  •  ondansetron  •  sodium chloride and Allergies:  Patient has no known allergies.    Objective     Vital Signs  Temp:  [97.2 °F (36.2 °C)-98.6 °F (37 °C)] 97.52 °F (36.4 °C)  Heart Rate:  [37-86] 82  Resp:  [16-24] 16  BP: (123-155)/() 127/40    I/O this shift:  In: 1840 [P.O.:476; I.V.:1364]  Out: 950 [Urine:950]  I/O last 3 completed shifts:  In:  50 [IV Piggyback:50]  Out: 1575 [Urine:1575]    Physical Exam  Constitutional:       Appearance: Normal appearance.   HENT:      Head: Normocephalic.      Nose: Nose normal.      Mouth/Throat:      Mouth: Mucous membranes are moist.   Eyes:      General: No scleral icterus.     Pupils: Pupils are equal, round, and reactive to light.   Cardiovascular:      Rate and Rhythm: Normal rate and regular rhythm.      Pulses: Normal pulses.   Pulmonary:      Effort: Pulmonary effort is normal.   Abdominal:      General: Abdomen is flat. There is no distension.   Musculoskeletal:         General: No swelling. Normal range of motion.      Cervical back: Normal range of motion.   Skin:     General: Skin is warm and dry.   Neurological:      Mental Status: He is alert.         Results Review:    I reviewed the patient's new clinical results.    WBC WBC   Date Value Ref Range Status   07/14/2021 8.19 3.40 - 10.80 10*3/mm3 Final   07/13/2021 10.67 3.40 - 10.80 10*3/mm3 Final      HGB Hemoglobin   Date Value Ref Range Status   07/14/2021 11.7 (L) 13.0 - 17.7 g/dL Final   07/13/2021 12.5 (L) 13.0 - 17.7 g/dL Final      HCT Hematocrit   Date Value Ref Range Status   07/14/2021 37.5 37.5 - 51.0 % Final   07/13/2021 40.7 37.5 - 51.0 % Final      Platlets No results found for: LABPLAT   MCV MCV   Date Value Ref Range Status   07/14/2021 92.6 79.0 - 97.0 fL Final   07/13/2021 93.1 79.0 - 97.0 fL Final          Sodium Sodium   Date Value Ref Range Status   07/14/2021 143 136 - 145 mmol/L Final   07/14/2021 140 136 - 145 mmol/L Final   07/13/2021 143 136 - 145 mmol/L Final   07/13/2021 137 136 - 145 mmol/L Final     Sodium, Venous   Date Value Ref Range Status   07/13/2021 137.0 136 - 146 mmol/L Final      Potassium Potassium   Date Value Ref Range Status   07/14/2021 5.3 (H) 3.5 - 5.2 mmol/L Final     Comment:     Slight hemolysis detected by analyzer. Results may be affected.   07/14/2021 5.6 (H) 3.5 - 5.2 mmol/L Final   07/13/2021 5.6  (H) 3.5 - 5.2 mmol/L Final   07/13/2021 7.2 (C) 3.5 - 5.2 mmol/L Final     Potassium, Venous   Date Value Ref Range Status   07/13/2021 7.5 (C) 3.5 - 5.0 mmol/L Final      Chloride Chloride   Date Value Ref Range Status   07/14/2021 112 (H) 98 - 107 mmol/L Final   07/14/2021 111 (H) 98 - 107 mmol/L Final   07/13/2021 112 (H) 98 - 107 mmol/L Final   07/13/2021 109 (H) 98 - 107 mmol/L Final     Chloride, Venous    Date Value Ref Range Status   07/13/2021 109 (H) 98 - 106 mmol/L Final      CO2 CO2   Date Value Ref Range Status   07/14/2021 19.6 (L) 22.0 - 29.0 mmol/L Final   07/14/2021 18.3 (L) 22.0 - 29.0 mmol/L Final   07/13/2021 18.4 (L) 22.0 - 29.0 mmol/L Final   07/13/2021 18.2 (L) 22.0 - 29.0 mmol/L Final      BUN BUN   Date Value Ref Range Status   07/14/2021 39 (H) 8 - 23 mg/dL Final   07/14/2021 51 (H) 8 - 23 mg/dL Final   07/13/2021 55 (H) 8 - 23 mg/dL Final   07/13/2021 60 (H) 8 - 23 mg/dL Final      Creatinine Creatinine   Date Value Ref Range Status   07/14/2021 2.05 (H) 0.76 - 1.27 mg/dL Final   07/14/2021 2.46 (H) 0.76 - 1.27 mg/dL Final   07/13/2021 2.80 (H) 0.76 - 1.27 mg/dL Final   07/13/2021 3.05 (H) 0.76 - 1.27 mg/dL Final      Calcium Calcium   Date Value Ref Range Status   07/14/2021 9.1 8.6 - 10.5 mg/dL Final   07/14/2021 9.5 8.6 - 10.5 mg/dL Final   07/13/2021 9.4 8.6 - 10.5 mg/dL Final   07/13/2021 9.1 8.6 - 10.5 mg/dL Final      PO4 No results found for: CAPO4   Albumin Albumin   Date Value Ref Range Status   07/13/2021 4.60 3.50 - 5.20 g/dL Final      Magnesium Magnesium   Date Value Ref Range Status   07/13/2021 2.2 1.6 - 2.4 mg/dL Final      Uric Acid No results found for: URICACID         Assessment/Plan       Acute kidney injury (CMS/[facility])      Assessment & Plan  LISSETTE/CKD3-  Previous baseline creatinine of 1.7.  Acute worsening to 3.0 appears from volume depletion with rapid correction to 2.0 today.  He had been on chlorthalidone which is likely contributing to volume depletion but has been  on this some time.  Would hold for now. Continue supportive care.  UA benign without blood or protein.  Hyperkalemia-  Appears due to LISSETTE and acei.  Imrpoved now with IVF's.  Continue same.  HTN-  Would continue to hold chlorthalidone/acei for now.  H/o AAA repair-  Bradycardia-  DMII-  Avoid metformin for now with lissette.      I discussed the patients findings and my recommendations with patient    Ronald Batista MD  07/14/21  18:00 EDT

## 2021-07-14 NOTE — H&P
[facility] HISTORY AND PHYSICAL  Date: 2021   Patient Name: Narciso Constantino  : 1950  MRN: [id number]  Primary Care Physician:  Grant Randall MD  Date of admission: 2021    Subjective   Subjective     Chief Complaint: Slow heart rate    HPI:    Narciso Constantino is a 71 y.o. male with a history of coronary artery disease, hypertension, diabetes mellitus and COPD presents the hospital with slow heart rate.  Patient was at Dr. Vazquez's office today for weakness and was noted to have heart rate of the 30s.  He was sent to the emergency department from the doctor's office.  In the ER he was noted to have sinus bradycardia.  A stat VBG was obtained which did reveal significantly elevated hyperkalemia greater than 7.  The patient was treated rapidly to bring his potassium down.  He was also noted to have acute kidney injury and was given IV fluids with normal saline.  Due to these findings we are asked to admit him for further care and investigation of his new findings      Personal History     Past Medical History:  Past Medical History:   Diagnosis Date   • AAA (abdominal aortic aneurysm) (CMS/[facility]) 2017    REPAIRED   • Chest pain    • Chronic bronchitis (CMS/[facility])    • COPD (chronic obstructive pulmonary disease) (CMS/[facility])    • Coronary artery disease    • Dyspnea    • Hypertension    Diabetes mellitus      Past Surgical History:  Past Medical History:   Diagnosis Date   • AAA (abdominal aortic aneurysm) (CMS/[facility]) 2017    REPAIRED   • Chest pain    • Chronic bronchitis (CMS/[facility])    • COPD (chronic obstructive pulmonary disease) (CMS/[facility])    • Coronary artery disease    • Dyspnea    • Hypertension          Family History:   Family History   Problem Relation Age of Onset   • No Known Problems Mother    • No Known Problems Father          Social History:   Social History     Socioeconomic History   • Marital status:      Spouse name: Not on file   • Number of children: Not on  file   • Years of education: Not on file   • Highest education level: Not on file   Tobacco Use   • Smoking status: Former Smoker     Packs/day: 2.00     Years: 50.00     Pack years: 100.00     Types: Cigarettes     Quit date: 7/18/2017     Years since quitting: 3.9   Vaping Use   • Vaping Use: Never used   Substance and Sexual Activity   • Alcohol use: No   • Drug use: No   • Sexual activity: Defer       Home Medications:  albuterol sulfate HFA, amLODIPine, aspirin, atorvastatin, budesonide-formoterol, clopidogrel, metFORMIN, metoprolol tartrate, and umeclidinium-vilanterol    Allergies:  No Known Allergies    Review of Systems   All systems were reviewed and negative except for: Noted above or in HPI.  No diarrhea or vomiting.  No chest pain    Objective   Objective     Vitals:   Temp:  [98.6 °F (37 °C)] 98.6 °F (37 °C)  Heart Rate:  [37-79] 79  Resp:  [20-22] 20  BP: (137-149)/() 146/56  Flow (L/min):  [2] 2    Physical Exam    Constitutional: Awake, alert, no acute distress   Eyes: Pupils equal, sclerae anicteric, no conjunctival injection   HENT: NCAT, mucous membranes moist   Neck: Supple, no thyromegaly, no lymphadenopathy, trachea midline   Respiratory: Clear to auscultation bilaterally, nonlabored respirations    Cardiovascular: RRR, no murmurs, rubs, or gallops, palpable pedal pulses bilaterally   Gastrointestinal: Positive bowel sounds, soft, nontender, nondistended   Musculoskeletal: No bilateral ankle edema, no clubbing or cyanosis to extremities   Psychiatric: Appropriate affect, cooperative   Neurologic: Oriented x 3, strength symmetric in all extremities, Cranial Nerves grossly intact to confrontation, speech clear   Skin: No rashes     Result Review    Result Review:  I have personally reviewed the results from the time of this admission to 7/13/2021 22:12 EDT and agree with these findings:  [x]  Laboratory  []  Microbiology  [x]  Radiology  [x]  EKG/Telemetry   []  Cardiology/Vascular   []   Pathology  []  Old records  []  Other:      Assessment/Plan   Assessment / Plan     Assessment/Plan:   Severe symptomatic sinus bradycardia  Hyperkalemia causing above  Acute renal failure.  Unclear etiology.  Possibly related to Metformin use  Diabetes mellitus on Metformin  Hypertension  Coronary artery disease    Plan  Patient is admitted for further care  Continuous cardiac monitoring  His potassium and heart rate have already improved significantly prior to my becoming involved  Repeat metabolic panel  Continue IV fluids with normal saline  Consult nephrology  Hold metformin  Sliding scale insulin  Troponin negative.  Patient not experience any chest pain.  Bradycardia most likely secondary to potassium as it resolved with correction of potassium  Further recommendations will be based on his hospital course      DVT prophylaxis:  Medical and mechanical DVT prophylaxis orders are present.    CODE STATUS:         Admission Status:  I believe this patient meets inpatient status.    Electronically signed by Emerson Joel DO, 07/13/21, 10:12 PM EDT.

## 2021-07-14 NOTE — PLAN OF CARE
Goal Outcome Evaluation:  Plan of Care Reviewed With: patient        Progress: no change     Pt admitted this shift. Vital signs stable. No issues or complaints at this time. Good urine output.

## 2021-07-14 NOTE — PROGRESS NOTES
[facility]   Hospitalist Progress Note  Date: 2021  Patient Name: Narciso Constantino  : 1950  MRN: [id number]  Date of admission: 2021      Subjective   Subjective     Chief Complaint:   Referred from PCPs office for bradycardia    Summary:   Narciso Constantino is a 71 y.o. male with a history of coronary artery disease, hypertension, diabetes mellitus and COPD presents to the hospital with slow heart rate.  Patient was at Dr. Vazquez's office for weakness and was noted to have heart rate in the 30s.  He was sent to the emergency department from the doctor's office.  In the ER he was noted to have sinus bradycardia.  A stat VBG was obtained which did reveal significantly elevated hyperkalemia greater than 7.    Following treatment of patient's hyperkalemia his heart rate significantly improved. He was also noted to have acute kidney injury and was given IV fluids with normal saline.  Due to these findings we are asked to admit him for further care and investigation of his new findings    Interval Followup:   Patient's potassium down to 5.3.  Patient's creatinine has improved from admission 3.05 down to 2.05.  Patient remains on continuous IV fluids at this time.  Consult to nephrology was placed overnight.  Patient states he feels significantly improved since presenting to the hospital.  On full review of systems patient denies any other symptoms preceding his admission.  Simply states he was feeling weak denies any infectious prodrome surrounding this weakness.    Review of Systems   All systems were reviewed and negative except for: Weakness albeit improved    Objective   Objective     Vitals:   Temp:  [97.2 °F (36.2 °C)-98.6 °F (37 °C)] 97.2 °F (36.2 °C)  Heart Rate:  [37-86] 86  Resp:  [16-24] 16  BP: (123-155)/() 134/50  Flow (L/min):  [2] 2  Physical Exam    Constitutional: Awake, alert, no acute distress   Eyes: Pupils equal, sclerae anicteric, no conjunctival injection   HENT: NCAT,  mucous membranes moist   Neck: Supple, no thyromegaly, no lymphadenopathy, trachea midline   Respiratory: Clear to auscultation bilaterally, nonlabored respirations    Cardiovascular: RRR, no murmurs, rubs, or gallops, palpable pedal pulses bilaterally   Gastrointestinal: Positive bowel sounds, soft, nontender, nondistended   Musculoskeletal: No bilateral ankle edema, no clubbing or cyanosis to extremities   Psychiatric: Appropriate affect, cooperative   Neurologic: Oriented x 3, strength symmetric in all extremities, Cranial Nerves grossly intact to confrontation, speech clear   Skin: No rashes     Result Review    Result Review:  I have personally reviewed the results from the time of this admission to 7/14/2021 13:55 EDT and agree with these findings:  [x]  Laboratory  []  Microbiology  [x]  Radiology  [x]  EKG/Telemetry   [x]  Cardiology/Vascular   []  Pathology  [x]  Old records  []  Other:    Assessment/Plan   Assessment / Plan     Assessment/Plan:  Symptomatic sinus bradycardia  Hyperkalemia resulting above  Acute kidney injury, improving  Hypertension  CAD    Plan:  Patient remains admitted to hospital for further care management  Nephrology consulted, appreciate assistance  Patient's potassium slowly improving, heart rate remains normal, 70 to 80s.  Continuing IV fluids at this time, creatinine continues to slowly improve  No known etiology for LISSETTE at this time, however renal function continues to improve, will hold all antihypertensives at this time  Continue telemetry, reviewed heart rate significantly improved following improvement in potassium  Patient has sliding-scale insulin available  Holding patient's home beta-blocker, takes metoprolol 25 mg twice daily    Discussed plan with RN.    DVT prophylaxis:  Medical and mechanical DVT prophylaxis orders are present.    CODE STATUS:   Code Status: CPR  Medical Interventions (Level of Support Prior to Arrest): Full        Electronically signed by Jhonny  MD Naima, 07/14/21, 1:55 PM EDT.

## 2021-07-15 LAB
ANION GAP SERPL CALCULATED.3IONS-SCNC: 7.9 MMOL/L (ref 5–15)
BUN SERPL-MCNC: 22 MG/DL (ref 8–23)
BUN/CREAT SERPL: 13.4 (ref 7–25)
CALCIUM SPEC-SCNC: 8.9 MG/DL (ref 8.6–10.5)
CHLORIDE SERPL-SCNC: 113 MMOL/L (ref 98–107)
CO2 SERPL-SCNC: 20.1 MMOL/L (ref 22–29)
CREAT SERPL-MCNC: 1.64 MG/DL (ref 0.76–1.27)
DEPRECATED RDW RBC AUTO: 51.2 FL (ref 37–54)
ERYTHROCYTE [DISTWIDTH] IN BLOOD BY AUTOMATED COUNT: 14.8 % (ref 12.3–15.4)
GFR SERPL CREATININE-BSD FRML MDRD: 42 ML/MIN/1.73
GLUCOSE BLDC GLUCOMTR-MCNC: 109 MG/DL (ref 70–130)
GLUCOSE BLDC GLUCOMTR-MCNC: 83 MG/DL (ref 70–130)
GLUCOSE BLDC GLUCOMTR-MCNC: 83 MG/DL (ref 70–130)
GLUCOSE BLDC GLUCOMTR-MCNC: 87 MG/DL (ref 70–130)
GLUCOSE SERPL-MCNC: 92 MG/DL (ref 65–99)
HCT VFR BLD AUTO: 40.8 % (ref 37.5–51)
HGB BLD-MCNC: 12.6 G/DL (ref 13–17.7)
MAGNESIUM SERPL-MCNC: 1.8 MG/DL (ref 1.6–2.4)
MCH RBC QN AUTO: 28.6 PG (ref 26.6–33)
MCHC RBC AUTO-ENTMCNC: 30.9 G/DL (ref 31.5–35.7)
MCV RBC AUTO: 92.7 FL (ref 79–97)
PLATELET # BLD AUTO: 195 10*3/MM3 (ref 140–450)
PMV BLD AUTO: 10.5 FL (ref 6–12)
POTASSIUM SERPL-SCNC: 5.4 MMOL/L (ref 3.5–5.2)
RBC # BLD AUTO: 4.4 10*6/MM3 (ref 4.14–5.8)
SODIUM SERPL-SCNC: 141 MMOL/L (ref 136–145)
WBC # BLD AUTO: 7.79 10*3/MM3 (ref 3.4–10.8)

## 2021-07-15 PROCEDURE — 94799 UNLISTED PULMONARY SVC/PX: CPT

## 2021-07-15 PROCEDURE — 80048 BASIC METABOLIC PNL TOTAL CA: CPT | Performed by: INTERNAL MEDICINE

## 2021-07-15 PROCEDURE — 83735 ASSAY OF MAGNESIUM: CPT | Performed by: INTERNAL MEDICINE

## 2021-07-15 PROCEDURE — 82962 GLUCOSE BLOOD TEST: CPT

## 2021-07-15 PROCEDURE — 25010000002 HEPARIN (PORCINE) PER 1000 UNITS: Performed by: FAMILY MEDICINE

## 2021-07-15 PROCEDURE — 85027 COMPLETE CBC AUTOMATED: CPT | Performed by: INTERNAL MEDICINE

## 2021-07-15 PROCEDURE — 99233 SBSQ HOSP IP/OBS HIGH 50: CPT | Performed by: INTERNAL MEDICINE

## 2021-07-15 RX ORDER — AMLODIPINE BESYLATE 5 MG/1
5 TABLET ORAL
Status: DISCONTINUED | OUTPATIENT
Start: 2021-07-15 | End: 2021-07-16 | Stop reason: HOSPADM

## 2021-07-15 RX ADMIN — CLOPIDOGREL BISULFATE 75 MG: 75 TABLET, FILM COATED ORAL at 08:36

## 2021-07-15 RX ADMIN — ASPIRIN 81 MG CHEWABLE TABLET 81 MG: 81 TABLET CHEWABLE at 08:36

## 2021-07-15 RX ADMIN — HEPARIN SODIUM 5000 UNITS: 5000 INJECTION INTRAVENOUS; SUBCUTANEOUS at 08:37

## 2021-07-15 RX ADMIN — AMLODIPINE BESYLATE 5 MG: 5 TABLET ORAL at 12:22

## 2021-07-15 RX ADMIN — HEPARIN SODIUM 5000 UNITS: 5000 INJECTION INTRAVENOUS; SUBCUTANEOUS at 20:02

## 2021-07-15 RX ADMIN — SODIUM CHLORIDE 100 ML/HR: 9 INJECTION, SOLUTION INTRAVENOUS at 05:31

## 2021-07-15 NOTE — PROGRESS NOTES
[facility]   Hospitalist Progress Note  Date: 7/15/2021  Patient Name: Narciso Constantino  : 1950  MRN: [id number]  Date of admission: 2021      Subjective   Subjective     Chief Complaint:   Referred from PCPs office for bradycardia    Summary:   Narciso Constantino is a 71 y.o. male with a history of coronary artery disease, hypertension, diabetes mellitus and COPD presents to the hospital with slow heart rate.  Patient was at Dr. Vazquez's office for weakness and was noted to have heart rate in the 30s.  He was sent to the emergency department from the doctor's office.  In the ER he was noted to have sinus bradycardia.  A stat VBG was obtained which did reveal significantly elevated hyperkalemia greater than 7.    Following treatment of patient's hyperkalemia his heart rate significantly improved. He was also noted to have acute kidney injury and was given IV fluids with normal saline.  Due to these findings we are asked to admit him for further care and investigation of his new findings    Interval Followup:   Patient blood pressure slowly going up, start Norvasc given his renal function.  Patient's potassium remained stable.  Patient's creatinine continues to improve.  Stopping continuous IV fluids, continue to monitor overnight.  Anticipate discharge home tomorrow.    Review of Systems   All systems were reviewed and negative except for: Weakness albeit improved    Objective   Objective     Vitals:   Temp:  [97.3 °F (36.3 °C)-98.3 °F (36.8 °C)] 97.6 °F (36.4 °C)  Heart Rate:  [68-74] 72  Resp:  [14-16] 16  BP: (135-156)/(58-79) 156/61  Flow (L/min):  [2] 2  Physical Exam    Constitutional: Awake, alert, no acute distress   Eyes: Pupils equal, sclerae anicteric, no conjunctival injection   HENT: NCAT, mucous membranes moist   Neck: Supple, no thyromegaly, no lymphadenopathy, trachea midline   Respiratory: Clear to auscultation bilaterally, nonlabored respirations    Cardiovascular: RRR, no murmurs,  rubs, or gallops, palpable pedal pulses bilaterally   Gastrointestinal: Positive bowel sounds, soft, nontender, nondistended   Musculoskeletal: No bilateral ankle edema, no clubbing or cyanosis to extremities   Psychiatric: Appropriate affect, cooperative   Neurologic: Oriented x 3, strength symmetric in all extremities, Cranial Nerves grossly intact to confrontation, speech clear   Skin: No rashes     Result Review    Result Review:  I have personally reviewed the results from the time of this admission to 7/15/2021 16:05 EDT and agree with these findings:  [x]  Laboratory  []  Microbiology  [x]  Radiology  [x]  EKG/Telemetry   [x]  Cardiology/Vascular   []  Pathology  [x]  Old records  []  Other:    Assessment/Plan   Assessment / Plan     Assessment/Plan:  Symptomatic sinus bradycardia  Hyperkalemia resulting above  Acute kidney injury, improving  Hypertension  CAD    Plan:  Patient remains admitted to hospital for further care management  Nephrology consulted, appreciate assistance  Patient's potassium main stable, heart rate remains normal, 70 to 80s.  Continue IV fluids  LISSETTE secondary to medications most likely, continue to hold lisinopril and Metformin  Started Norvasc for hypertension  Continue telemetry, reviewed heart rate significantly improved following improvement in potassium  Patient has sliding-scale insulin available  Holding patient's home beta-blocker, takes metoprolol 25 mg twice daily    Discussed plan with RN.    DVT prophylaxis:  Medical and mechanical DVT prophylaxis orders are present.    CODE STATUS:   Code Status: CPR  Medical Interventions (Level of Support Prior to Arrest): Full        Electronically signed by Jhonny Mcnamara MD, 07/15/21, 4:05 PM EDT.

## 2021-07-15 NOTE — PLAN OF CARE
Goal Outcome Evaluation:  Plan of Care Reviewed With: patient        Progress: improving     Pt has no new issues or complaints at this time. Vital signs stable.

## 2021-07-15 NOTE — PROGRESS NOTES
" LOS: 2 days   Patient Care Team:  Grant Randall MD as PCP - General (Pulmonary Disease)    Chief Complaint: LISSETTE/CKD3    Subjective     History of Present Illness    Subjective:  Symptoms:  No shortness of breath, chest pain, chest pressure or anxiety.    Diet:  No nausea or vomiting.    Pain:  He reports no pain.        History taken from: patient    Objective     Vital Sign Min/Max for last 24 hours  Temp  Min: 97.52 °F (36.4 °C)  Max: 98.3 °F (36.8 °C)   BP  Min: 127/40  Max: 154/72   Pulse  Min: 68  Max: 82   Resp  Min: 14  Max: 16   SpO2  Min: 96 %  Max: 98 %   Flow (L/min)  Min: 2  Max: 2   No data recorded     Flowsheet Rows      First Filed Value   Admission Height  160 cm (63\") Documented at 07/13/2021 1831   Admission Weight  61.7 kg (136 lb 0.4 oz) Documented at 07/13/2021 1831          I/O this shift:  In: 240 [P.O.:240]  Out: 200 [Urine:200]  I/O last 3 completed shifts:  In: 2754 [P.O.:476; I.V.:2228; IV Piggyback:50]  Out: 4725 [Urine:4725]    Objective:  General Appearance:  Comfortable.    Vital signs: (most recent): Blood pressure 154/72, pulse 70, temperature 98.3 °F (36.8 °C), temperature source Oral, resp. rate 16, height 160 cm (63\"), weight 80.2 kg (176 lb 12.9 oz), SpO2 98 %.  Vital signs are normal.    HEENT: Normal HEENT exam.    Lungs:  Normal effort and normal respiratory rate.  Breath sounds clear to auscultation.    Heart: Normal rate.  Regular rhythm.    Abdomen: Abdomen is soft.  Bowel sounds are normal.   There is no abdominal tenderness.     Extremities: Normal range of motion.    Skin:  Warm and dry.              Results Review:     I reviewed the patient's new clinical results.    WBC WBC   Date Value Ref Range Status   07/15/2021 7.79 3.40 - 10.80 10*3/mm3 Final   07/14/2021 8.19 3.40 - 10.80 10*3/mm3 Final   07/13/2021 10.67 3.40 - 10.80 10*3/mm3 Final      HGB Hemoglobin   Date Value Ref Range Status   07/15/2021 12.6 (L) 13.0 - 17.7 g/dL Final   07/14/2021 11.7 (L) " 13.0 - 17.7 g/dL Final   07/13/2021 12.5 (L) 13.0 - 17.7 g/dL Final      HCT Hematocrit   Date Value Ref Range Status   07/15/2021 40.8 37.5 - 51.0 % Final   07/14/2021 37.5 37.5 - 51.0 % Final   07/13/2021 40.7 37.5 - 51.0 % Final      Platlets No results found for: LABPLAT   MCV MCV   Date Value Ref Range Status   07/15/2021 92.7 79.0 - 97.0 fL Final   07/14/2021 92.6 79.0 - 97.0 fL Final   07/13/2021 93.1 79.0 - 97.0 fL Final          Sodium Sodium   Date Value Ref Range Status   07/15/2021 141 136 - 145 mmol/L Final   07/14/2021 143 136 - 145 mmol/L Final   07/14/2021 140 136 - 145 mmol/L Final   07/13/2021 143 136 - 145 mmol/L Final   07/13/2021 137 136 - 145 mmol/L Final     Sodium, Venous   Date Value Ref Range Status   07/13/2021 137.0 136 - 146 mmol/L Final      Potassium Potassium   Date Value Ref Range Status   07/15/2021 5.4 (H) 3.5 - 5.2 mmol/L Final   07/14/2021 5.3 (H) 3.5 - 5.2 mmol/L Final     Comment:     Slight hemolysis detected by analyzer. Results may be affected.   07/14/2021 5.6 (H) 3.5 - 5.2 mmol/L Final   07/13/2021 5.6 (H) 3.5 - 5.2 mmol/L Final   07/13/2021 7.2 (C) 3.5 - 5.2 mmol/L Final     Potassium, Venous   Date Value Ref Range Status   07/13/2021 7.5 (C) 3.5 - 5.0 mmol/L Final      Chloride Chloride   Date Value Ref Range Status   07/15/2021 113 (H) 98 - 107 mmol/L Final   07/14/2021 112 (H) 98 - 107 mmol/L Final   07/14/2021 111 (H) 98 - 107 mmol/L Final   07/13/2021 112 (H) 98 - 107 mmol/L Final   07/13/2021 109 (H) 98 - 107 mmol/L Final     Chloride, Venous    Date Value Ref Range Status   07/13/2021 109 (H) 98 - 106 mmol/L Final      CO2 CO2   Date Value Ref Range Status   07/15/2021 20.1 (L) 22.0 - 29.0 mmol/L Final   07/14/2021 19.6 (L) 22.0 - 29.0 mmol/L Final   07/14/2021 18.3 (L) 22.0 - 29.0 mmol/L Final   07/13/2021 18.4 (L) 22.0 - 29.0 mmol/L Final   07/13/2021 18.2 (L) 22.0 - 29.0 mmol/L Final      BUN BUN   Date Value Ref Range Status   07/15/2021 22 8 - 23 mg/dL Final    07/14/2021 39 (H) 8 - 23 mg/dL Final   07/14/2021 51 (H) 8 - 23 mg/dL Final   07/13/2021 55 (H) 8 - 23 mg/dL Final   07/13/2021 60 (H) 8 - 23 mg/dL Final      Creatinine Creatinine   Date Value Ref Range Status   07/15/2021 1.64 (H) 0.76 - 1.27 mg/dL Final   07/14/2021 2.05 (H) 0.76 - 1.27 mg/dL Final   07/14/2021 2.46 (H) 0.76 - 1.27 mg/dL Final   07/13/2021 2.80 (H) 0.76 - 1.27 mg/dL Final   07/13/2021 3.05 (H) 0.76 - 1.27 mg/dL Final      Calcium Calcium   Date Value Ref Range Status   07/15/2021 8.9 8.6 - 10.5 mg/dL Final   07/14/2021 9.1 8.6 - 10.5 mg/dL Final   07/14/2021 9.5 8.6 - 10.5 mg/dL Final   07/13/2021 9.4 8.6 - 10.5 mg/dL Final   07/13/2021 9.1 8.6 - 10.5 mg/dL Final      PO4 No results found for: CAPO4   Albumin Albumin   Date Value Ref Range Status   07/13/2021 4.60 3.50 - 5.20 g/dL Final      Magnesium Magnesium   Date Value Ref Range Status   07/15/2021 1.8 1.6 - 2.4 mg/dL Final   07/13/2021 2.2 1.6 - 2.4 mg/dL Final      Uric Acid No results found for: URICACID     Medication Review: y    Assessment/Plan       Acute kidney injury (CMS/[facility])      Assessment & Plan     LISSETTE/CKD3-  Previous baseline creatinine of 1.7.  Acute worsening to 3.0 appears from volume depletion with rapid correction to 2.0->1.6 today.  He had been on chlorthalidone which is likely contributing to volume depletion but has been on this some time.  Would hold for now. Continue supportive care.  UA benign without blood or protein.  Hyperkalemia-  Appears due to LISSETTE and acei.  Improved now with IVF's.  Continue same.  Avoid nsaids, low K+ diet.  HTN-  Would continue to hold chlorthalidone/acei for now.  Will add norvasc at this time in place.  H/o AAA repair-  Bradycardia-  Appears resolved.  DMII-  Avoid metformin for now.    Ronald Batista MD  07/15/21  11:08 EDT

## 2021-07-16 VITALS
RESPIRATION RATE: 16 BRPM | DIASTOLIC BLOOD PRESSURE: 90 MMHG | TEMPERATURE: 97.9 F | OXYGEN SATURATION: 94 % | BODY MASS INDEX: 31.33 KG/M2 | HEART RATE: 82 BPM | HEIGHT: 63 IN | SYSTOLIC BLOOD PRESSURE: 146 MMHG | WEIGHT: 176.81 LBS

## 2021-07-16 LAB
ANION GAP SERPL CALCULATED.3IONS-SCNC: 12.4 MMOL/L (ref 5–15)
ANION GAP SERPL CALCULATED.3IONS-SCNC: 8.8 MMOL/L (ref 5–15)
BUN SERPL-MCNC: 19 MG/DL (ref 8–23)
BUN SERPL-MCNC: 24 MG/DL (ref 8–23)
BUN/CREAT SERPL: 12.1 (ref 7–25)
BUN/CREAT SERPL: 14.5 (ref 7–25)
CALCIUM SPEC-SCNC: 8.9 MG/DL (ref 8.6–10.5)
CALCIUM SPEC-SCNC: 9.4 MG/DL (ref 8.6–10.5)
CHLORIDE SERPL-SCNC: 104 MMOL/L (ref 98–107)
CHLORIDE SERPL-SCNC: 106 MMOL/L (ref 98–107)
CO2 SERPL-SCNC: 21.2 MMOL/L (ref 22–29)
CO2 SERPL-SCNC: 22.6 MMOL/L (ref 22–29)
CREAT SERPL-MCNC: 1.57 MG/DL (ref 0.76–1.27)
CREAT SERPL-MCNC: 1.65 MG/DL (ref 0.76–1.27)
DEPRECATED RDW RBC AUTO: 49.5 FL (ref 37–54)
ERYTHROCYTE [DISTWIDTH] IN BLOOD BY AUTOMATED COUNT: 14.6 % (ref 12.3–15.4)
GFR SERPL CREATININE-BSD FRML MDRD: 41 ML/MIN/1.73
GFR SERPL CREATININE-BSD FRML MDRD: 44 ML/MIN/1.73
GLUCOSE BLDC GLUCOMTR-MCNC: 88 MG/DL (ref 70–130)
GLUCOSE BLDC GLUCOMTR-MCNC: 91 MG/DL (ref 70–130)
GLUCOSE SERPL-MCNC: 102 MG/DL (ref 65–99)
GLUCOSE SERPL-MCNC: 96 MG/DL (ref 65–99)
HCT VFR BLD AUTO: 42.2 % (ref 37.5–51)
HGB BLD-MCNC: 13.2 G/DL (ref 13–17.7)
MAGNESIUM SERPL-MCNC: 2 MG/DL (ref 1.6–2.4)
MCH RBC QN AUTO: 28.8 PG (ref 26.6–33)
MCHC RBC AUTO-ENTMCNC: 31.3 G/DL (ref 31.5–35.7)
MCV RBC AUTO: 91.9 FL (ref 79–97)
PLATELET # BLD AUTO: 194 10*3/MM3 (ref 140–450)
PMV BLD AUTO: 10.1 FL (ref 6–12)
POTASSIUM SERPL-SCNC: 5.3 MMOL/L (ref 3.5–5.2)
POTASSIUM SERPL-SCNC: 5.5 MMOL/L (ref 3.5–5.2)
RBC # BLD AUTO: 4.59 10*6/MM3 (ref 4.14–5.8)
SODIUM SERPL-SCNC: 136 MMOL/L (ref 136–145)
SODIUM SERPL-SCNC: 139 MMOL/L (ref 136–145)
WBC # BLD AUTO: 7.98 10*3/MM3 (ref 3.4–10.8)

## 2021-07-16 PROCEDURE — 82962 GLUCOSE BLOOD TEST: CPT

## 2021-07-16 PROCEDURE — 80048 BASIC METABOLIC PNL TOTAL CA: CPT | Performed by: INTERNAL MEDICINE

## 2021-07-16 PROCEDURE — 25010000002 HEPARIN (PORCINE) PER 1000 UNITS: Performed by: FAMILY MEDICINE

## 2021-07-16 PROCEDURE — 99239 HOSP IP/OBS DSCHRG MGMT >30: CPT | Performed by: INTERNAL MEDICINE

## 2021-07-16 PROCEDURE — 25010000002 FUROSEMIDE PER 20 MG: Performed by: INTERNAL MEDICINE

## 2021-07-16 PROCEDURE — 85027 COMPLETE CBC AUTOMATED: CPT | Performed by: INTERNAL MEDICINE

## 2021-07-16 PROCEDURE — 83735 ASSAY OF MAGNESIUM: CPT | Performed by: INTERNAL MEDICINE

## 2021-07-16 RX ORDER — HYDROCHLOROTHIAZIDE 12.5 MG/1
12.5 CAPSULE, GELATIN COATED ORAL DAILY
Status: DISCONTINUED | OUTPATIENT
Start: 2021-07-16 | End: 2021-07-16 | Stop reason: HOSPADM

## 2021-07-16 RX ORDER — HYDROCHLOROTHIAZIDE 12.5 MG/1
12.5 CAPSULE, GELATIN COATED ORAL DAILY
Qty: 30 CAPSULE | Refills: 0 | Status: SHIPPED | OUTPATIENT
Start: 2021-07-17 | End: 2021-10-27

## 2021-07-16 RX ORDER — SODIUM CHLORIDE 9 MG/ML
100 INJECTION, SOLUTION INTRAVENOUS CONTINUOUS
Status: DISCONTINUED | OUTPATIENT
Start: 2021-07-16 | End: 2021-07-16 | Stop reason: HOSPADM

## 2021-07-16 RX ORDER — FUROSEMIDE 10 MG/ML
40 INJECTION INTRAMUSCULAR; INTRAVENOUS ONCE
Status: COMPLETED | OUTPATIENT
Start: 2021-07-16 | End: 2021-07-16

## 2021-07-16 RX ORDER — AMLODIPINE BESYLATE 10 MG/1
5 TABLET ORAL DAILY
Qty: 30 TABLET | Refills: 6 | Status: SHIPPED | OUTPATIENT
Start: 2021-07-16 | End: 2021-10-27 | Stop reason: SDDI

## 2021-07-16 RX ADMIN — HYDROCHLOROTHIAZIDE 12.5 MG: 12.5 CAPSULE ORAL at 15:38

## 2021-07-16 RX ADMIN — SODIUM CHLORIDE 100 ML/HR: 9 INJECTION, SOLUTION INTRAVENOUS at 10:32

## 2021-07-16 RX ADMIN — AMLODIPINE BESYLATE 5 MG: 5 TABLET ORAL at 08:31

## 2021-07-16 RX ADMIN — HEPARIN SODIUM 5000 UNITS: 5000 INJECTION INTRAVENOUS; SUBCUTANEOUS at 08:32

## 2021-07-16 RX ADMIN — CLOPIDOGREL BISULFATE 75 MG: 75 TABLET, FILM COATED ORAL at 08:31

## 2021-07-16 RX ADMIN — FUROSEMIDE 40 MG: 10 INJECTION, SOLUTION INTRAMUSCULAR; INTRAVENOUS at 10:32

## 2021-07-16 RX ADMIN — ASPIRIN 81 MG CHEWABLE TABLET 81 MG: 81 TABLET CHEWABLE at 08:31

## 2021-07-16 NOTE — PLAN OF CARE
Goal Outcome Evaluation:  Plan of Care Reviewed With: patient        Progress: improving     Possibly discharging home today. No complaints during the night. Vital signs stable.

## 2021-07-16 NOTE — DISCHARGE SUMMARY
[facility]         HOSPITALIST  DISCHARGE SUMMARY    Patient Name: Narciso Constantino  : 1950  MRN: [id number]    Date of Admission: 2021  Date of Discharge:  2021  Primary Care Physician: Grant Randall MD    Consults     Date and Time Order Name Status Description    2021 10:16 PM Inpatient Nephrology Consult      2021  8:50 PM Inpatient Hospitalist Consult Completed           Active and Resolved Hospital Problems:  Active Hospital Problems    Diagnosis POA   • Acute kidney injury (CMS/HCC) [N17.9] Yes      Resolved Hospital Problems   No resolved problems to display.       Hospital Course     Hospital Course:  Narciso Constantino is a 71 y.o. male with a history of coronary artery disease, hypertension, diabetes mellitus and COPD presents to the hospital with slow heart rate.  Patient was at Dr. Vazquez's office for weakness and was noted to have heart rate in the 30s.  He was sent to the emergency department from the doctor's office.  In the ER he was noted to have sinus bradycardia, rates in 30s.  Patient's potassium found to be 7.2 with a creatinine of 3.  Following treatment of patient's hyperkalemia patient's heart rate improved.  LISSETTE felt secondary to combination of lisinopril, chlorthalidone and Metformin.  Patient was seen by nephrology and patient patient's creatinine improved back to his baseline on date of discharge.  Patient leaving on chlorthalidone and amlodipine for blood pressure control.  Patient's potassium 5.2 on date of discharge, this will need to be followed up as an outpatient.  Patient discharging off of his Metformin, glucoses were well controlled off of Metformin while inpatient.  However, outpatient glucoses will need to be monitored.  Patient seen on date of discharge, clinically and hemodynamically stable.  Patient provided concerning signs and symptoms prompting immediate medical attention, patient understanding and agreeable    DISCHARGE  Follow Up Recommendations for labs and diagnostics:   Patient will need to follow-up with PCP within 1 week  Repeat BMP for monitoring of potassium and creatinine  Continue to monitor outpatient glucose given discharge off Metformin  Patient discharging off of his beta-blocker, heart rates in the 80s on date of discharge      Day of Discharge     Vital Signs:  Temp:  [97.7 °F (36.5 °C)-98 °F (36.7 °C)] 97.9 °F (36.6 °C)  Heart Rate:  [71-84] 82  Resp:  [16-20] 16  BP: (145-182)/(64-92) 146/90  Flow (L/min):  [2] 2  Physical Exam:               Constitutional: Awake, alert, no acute distress              Eyes: Pupils equal, sclerae anicteric, no conjunctival injection              HENT: NCAT, mucous membranes moist              Neck: Supple, no thyromegaly, no lymphadenopathy, trachea midline              Respiratory: Clear to auscultation bilaterally, nonlabored respirations               Cardiovascular: RRR, no murmurs, rubs, or gallops, palpable pedal pulses bilaterally              Gastrointestinal: Positive bowel sounds, soft, nontender, nondistended              Musculoskeletal: No bilateral ankle edema, no clubbing or cyanosis to extremities              Psychiatric: Appropriate affect, cooperative              Neurologic: Oriented x 3, strength symmetric in all extremities, Cranial Nerves grossly intact to confrontation, speech clear              Skin: No rashes       Discharge Details        Discharge Medications      New Medications      Instructions Start Date   hydroCHLOROthiazide 12.5 MG capsule  Commonly known as: MICROZIDE   12.5 mg, Oral, Daily   Start Date: July 17, 2021        Changes to Medications      Instructions Start Date   amLODIPine 10 MG tablet  Commonly known as: NORVASC  What changed: how much to take   5 mg, Oral, Daily         Continue These Medications      Instructions Start Date   aspirin 81 MG chewable tablet   81 mg, Oral, Daily      atorvastatin 20 MG tablet  Commonly known as:  Lipitor   20 mg, Oral, Nightly      clopidogrel 75 MG tablet  Commonly known as: PLAVIX   75 mg, Oral, Daily      Proventil  (90 Base) MCG/ACT inhaler  Generic drug: albuterol sulfate HFA   2 puffs, Inhalation, Every 4 Hours PRN      Symbicort 160-4.5 MCG/ACT inhaler  Generic drug: budesonide-formoterol   2 puffs, Inhalation, 2 Times Daily - RT         Stop These Medications    chlorthalidone 25 MG tablet  Commonly known as: HYGROTON     lisinopril 20 MG tablet  Commonly known as: PRINIVIL,ZESTRIL     metFORMIN 1000 MG tablet  Commonly known as: GLUCOPHAGE     metoprolol tartrate 25 MG tablet  Commonly known as: LOPRESSOR            No Known Allergies    Discharge Disposition:  Home or Self Care    Diet:  Hospital:  Diet Order   Procedures   • Diet Regular; Renal, Consistent Carbohydrate       Discharge Activity:   Activity Instructions     Activity as Tolerated            CODE STATUS:  Code Status and Medical Interventions:   Ordered at: 07/13/21 5481     Code Status:    CPR     Medical Interventions (Level of Support Prior to Arrest):    Full         Future Appointments   Date Time Provider Department Center   8/27/2021 11:15 AM Rebel Chang MD [facility]       Additional Instructions for the Follow-ups that You Need to Schedule     Discharge Follow-up with PCP   As directed       Currently Documented PCP:    Grant Randall MD    PCP Phone Number:    [phone]     Follow Up Details: In less than one week               Pertinent  and/or Most Recent Results     PROCEDURES:       LAB RESULTS:      Lab 07/16/21  0453 07/15/21  0509 07/14/21  0253 07/13/21  1927 07/13/21  1839   WBC 7.98 7.79 8.19  --  10.67   HEMOGLOBIN 13.2 12.6* 11.7*  --  12.5*   HEMATOCRIT 42.2 40.8 37.5  --  40.7   PLATELETS 194 195 190  --  213   NEUTROS ABS  --   --  5.70  --  6.69   IMMATURE GRANS (ABS)  --   --  0.04  --  0.05   LYMPHS ABS  --   --  1.04  --  1.86   MONOS ABS  --   --  0.85  --  1.06*   EOS ABS   --   --  0.51*  --  0.94*   MCV 91.9 92.7 92.6  --  93.1   LACTATE, ARTERIAL  --   --   --  1.32  --    PROTIME  --   --   --  10.9  --    APTT  --   --   --  21.6*  --          Lab 07/16/21  1517 07/16/21  0453 07/15/21  0509 07/14/21  1152 07/14/21  0253 07/13/21 1927 07/13/21 1839   SODIUM 139 136 141 143 140  --  137   SODIUM, VENOUS  --   --   --   --   --  137.0  --    POTASSIUM 5.3* 5.5* 5.4* 5.3* 5.6*  --  7.2*   POTASSIUM, VENOUS  --   --   --   --   --  7.5*  --    CHLORIDE 104 106 113* 112* 111*  --  109*   CHLORIDE, VENOUS  --   --   --   --   --  109*  --    CO2 22.6 21.2* 20.1* 19.6* 18.3*  --  18.2*   ANION GAP 12.4 8.8 7.9 11.4 10.7  --  9.8   BUN 24* 19 22 39* 51*  --  60*   CREATININE 1.65* 1.57* 1.64* 2.05* 2.46*  --  3.05*   GLUCOSE 102* 96 92 166* 112*  --  106*   GLUCOSE, ARTERIAL  --   --   --   --   --  90  --    CALCIUM 9.4 8.9 8.9 9.1 9.5  --  9.1   IONIZED CALCIUM  --   --   --   --   --  1.19  --    MAGNESIUM  --  2.0 1.8  --   --   --  2.2   TSH  --   --   --   --   --   --  2.360         Lab 07/13/21 1839   TOTAL PROTEIN 7.2   ALBUMIN 4.60   GLOBULIN 2.6   ALT (SGPT) 29   AST (SGOT) 21   BILIRUBIN 0.4   ALK PHOS 73         Lab 07/13/21 1927 07/13/21 1839   PROBNP  --  1,818.0*   TROPONIN T  --  0.021   PROTIME 10.9  --    INR 0.99*  --                  Lab 07/13/21  1927   CARBOXYHEMOGLOBIN 1.4     Brief Urine Lab Results  (Last result in the past 365 days)      Color   Clarity   Blood   Leuk Est   Nitrite   Protein   CREAT   Urine HCG        07/13/21 2223 Yellow Clear Negative Large (3+) Negative Negative             Microbiology Results (last 10 days)     ** No results found for the last 240 hours. **               Results for orders placed during the hospital encounter of 07/18/17    Doppler Ankle Brachial Index Single Level CAR    Interpretation Summary  · Right Conclusion: The right ROB is normal. Normal digital pressures.  · Left Conclusion: The left ROB is normal. Normal  digital pressures.      Results for orders placed during the hospital encounter of 07/18/17    Doppler Ankle Brachial Index Single Level CAR    Interpretation Summary  · Right Conclusion: The right ROB is normal. Normal digital pressures.  · Left Conclusion: The left ROB is normal. Normal digital pressures.      Results for orders placed during the hospital encounter of 07/18/17    Adult Transthoracic Echo Complete    Interpretation Summary  · Left ventricular systolic function is normal. Calculated EF = 55.6%. Estimated EF was in agreement with the calculated EF. Estimated EF = 56%. Normal left ventricular cavity size and wall thickness noted. Left ventricular diastolic dysfunction is noted (grade I) consistent with impaired relaxation.  · The following segments are hypokinetic: basal inferior, mid inferolateral, mid anterolateral and apical lateral. All other segments are normal.  · The aortic valve is abnormal in structure. There is moderate thickening of the aortic valve. Mild to moderate aortic valve regurgitation is present  · Mitral annular calcification is present. Mild mitral valve regurgitation is present.      Labs Pending at Discharge:        Time spent on Discharge including face to face service:  35 minutes    Electronically signed by Jhonny Mcnamara MD, 07/16/21, 4:03 PM EDT.

## 2021-07-16 NOTE — PROGRESS NOTES
" LOS: 3 days   Patient Care Team:  Grant Randall MD as PCP - General (Pulmonary Disease)    Chief Complaint: LISSETTE/CKD3    Subjective   Pt doing well without any acute complaints  Asking about going home.    Dizziness  Pertinent negatives include no chest pain, nausea or vomiting.   Diarrhea   Pertinent negatives include no vomiting.       Subjective:  Symptoms:  He reports diarrhea.  No shortness of breath, chest pain, chest pressure or anxiety.    Diet:  No nausea or vomiting.    Pain:  He reports no pain.        History taken from: patient    Objective     Vital Sign Min/Max for last 24 hours  Temp  Min: 97.6 °F (36.4 °C)  Max: 98 °F (36.7 °C)   BP  Min: 145/70  Max: 182/76   Pulse  Min: 71  Max: 84   Resp  Min: 16  Max: 20   SpO2  Min: 91 %  Max: 98 %   Flow (L/min)  Min: 2  Max: 2   No data recorded     Flowsheet Rows      First Filed Value   Admission Height  160 cm (63\") Documented at 07/13/2021 1831   Admission Weight  61.7 kg (136 lb 0.4 oz) Documented at 07/13/2021 1831          I/O this shift:  In: 940 [P.O.:240; I.V.:700]  Out: 200 [Urine:200]  I/O last 3 completed shifts:  In: 1584 [P.O.:720; I.V.:864]  Out: 2925 [Urine:2925]    Objective:  General Appearance:  Comfortable.    Vital signs: (most recent): Blood pressure 160/92, pulse 84, temperature 97.88 °F (36.6 °C), temperature source Oral, resp. rate 16, height 160 cm (63\"), weight 80.2 kg (176 lb 12.9 oz), SpO2 95 %.  Vital signs are normal.    HEENT: Normal HEENT exam.    Lungs:  Normal effort and normal respiratory rate.  Breath sounds clear to auscultation.    Heart: Normal rate.  Regular rhythm.    Abdomen: Abdomen is soft.  Bowel sounds are normal.   There is no abdominal tenderness.     Extremities: Normal range of motion.    Skin:  Warm and dry.              Results Review:     I reviewed the patient's new clinical results.    WBC WBC   Date Value Ref Range Status   07/16/2021 7.98 3.40 - 10.80 10*3/mm3 Final   07/15/2021 7.79 3.40 - " 10.80 10*3/mm3 Final   07/14/2021 8.19 3.40 - 10.80 10*3/mm3 Final   07/13/2021 10.67 3.40 - 10.80 10*3/mm3 Final      HGB Hemoglobin   Date Value Ref Range Status   07/16/2021 13.2 13.0 - 17.7 g/dL Final   07/15/2021 12.6 (L) 13.0 - 17.7 g/dL Final   07/14/2021 11.7 (L) 13.0 - 17.7 g/dL Final   07/13/2021 12.5 (L) 13.0 - 17.7 g/dL Final      HCT Hematocrit   Date Value Ref Range Status   07/16/2021 42.2 37.5 - 51.0 % Final   07/15/2021 40.8 37.5 - 51.0 % Final   07/14/2021 37.5 37.5 - 51.0 % Final   07/13/2021 40.7 37.5 - 51.0 % Final      Platlets No results found for: LABPLAT   MCV MCV   Date Value Ref Range Status   07/16/2021 91.9 79.0 - 97.0 fL Final   07/15/2021 92.7 79.0 - 97.0 fL Final   07/14/2021 92.6 79.0 - 97.0 fL Final   07/13/2021 93.1 79.0 - 97.0 fL Final          Sodium Sodium   Date Value Ref Range Status   07/16/2021 136 136 - 145 mmol/L Final   07/15/2021 141 136 - 145 mmol/L Final   07/14/2021 143 136 - 145 mmol/L Final   07/14/2021 140 136 - 145 mmol/L Final   07/13/2021 143 136 - 145 mmol/L Final   07/13/2021 137 136 - 145 mmol/L Final     Sodium, Venous   Date Value Ref Range Status   07/13/2021 137.0 136 - 146 mmol/L Final      Potassium Potassium   Date Value Ref Range Status   07/16/2021 5.5 (H) 3.5 - 5.2 mmol/L Final   07/15/2021 5.4 (H) 3.5 - 5.2 mmol/L Final   07/14/2021 5.3 (H) 3.5 - 5.2 mmol/L Final     Comment:     Slight hemolysis detected by analyzer. Results may be affected.   07/14/2021 5.6 (H) 3.5 - 5.2 mmol/L Final   07/13/2021 5.6 (H) 3.5 - 5.2 mmol/L Final   07/13/2021 7.2 (C) 3.5 - 5.2 mmol/L Final     Potassium, Venous   Date Value Ref Range Status   07/13/2021 7.5 (C) 3.5 - 5.0 mmol/L Final      Chloride Chloride   Date Value Ref Range Status   07/16/2021 106 98 - 107 mmol/L Final   07/15/2021 113 (H) 98 - 107 mmol/L Final   07/14/2021 112 (H) 98 - 107 mmol/L Final   07/14/2021 111 (H) 98 - 107 mmol/L Final   07/13/2021 112 (H) 98 - 107 mmol/L Final   07/13/2021 109 (H) 98  - 107 mmol/L Final     Chloride, Venous    Date Value Ref Range Status   07/13/2021 109 (H) 98 - 106 mmol/L Final      CO2 CO2   Date Value Ref Range Status   07/16/2021 21.2 (L) 22.0 - 29.0 mmol/L Final   07/15/2021 20.1 (L) 22.0 - 29.0 mmol/L Final   07/14/2021 19.6 (L) 22.0 - 29.0 mmol/L Final   07/14/2021 18.3 (L) 22.0 - 29.0 mmol/L Final   07/13/2021 18.4 (L) 22.0 - 29.0 mmol/L Final   07/13/2021 18.2 (L) 22.0 - 29.0 mmol/L Final      BUN BUN   Date Value Ref Range Status   07/16/2021 19 8 - 23 mg/dL Final   07/15/2021 22 8 - 23 mg/dL Final   07/14/2021 39 (H) 8 - 23 mg/dL Final   07/14/2021 51 (H) 8 - 23 mg/dL Final   07/13/2021 55 (H) 8 - 23 mg/dL Final   07/13/2021 60 (H) 8 - 23 mg/dL Final      Creatinine Creatinine   Date Value Ref Range Status   07/16/2021 1.57 (H) 0.76 - 1.27 mg/dL Final   07/15/2021 1.64 (H) 0.76 - 1.27 mg/dL Final   07/14/2021 2.05 (H) 0.76 - 1.27 mg/dL Final   07/14/2021 2.46 (H) 0.76 - 1.27 mg/dL Final   07/13/2021 2.80 (H) 0.76 - 1.27 mg/dL Final   07/13/2021 3.05 (H) 0.76 - 1.27 mg/dL Final      Calcium Calcium   Date Value Ref Range Status   07/16/2021 8.9 8.6 - 10.5 mg/dL Final   07/15/2021 8.9 8.6 - 10.5 mg/dL Final   07/14/2021 9.1 8.6 - 10.5 mg/dL Final   07/14/2021 9.5 8.6 - 10.5 mg/dL Final   07/13/2021 9.4 8.6 - 10.5 mg/dL Final   07/13/2021 9.1 8.6 - 10.5 mg/dL Final      PO4 No results found for: CAPO4   Albumin Albumin   Date Value Ref Range Status   07/13/2021 4.60 3.50 - 5.20 g/dL Final      Magnesium Magnesium   Date Value Ref Range Status   07/16/2021 2.0 1.6 - 2.4 mg/dL Final   07/15/2021 1.8 1.6 - 2.4 mg/dL Final   07/13/2021 2.2 1.6 - 2.4 mg/dL Final      Uric Acid No results found for: URICACID     Medication Review: y    Assessment/Plan       Acute kidney injury (CMS/[facility])      Assessment & Plan     LISSETTE/CKD3-  Previous baseline creatinine of 1.7.  Acute worsening to 3.0 appears from volume depletion with rapid correction to 2.0->1.6->1.57 today.  He had been on  chlorthalidone which is likely contributing to volume depletion but has been on this some time.  Continue supportive care.  UA benign without blood or protein.  Hyperkalemia-  Appears due to LISSETTE and acei.  Improved now with IVF's.  but staying up.  Given lasix and IVF's today.  Will start on HCTZ today in place of chlorthalidone.  Repeat now.  IF <5.5 ok for d/c from renal. Would avoid acei/arb for now.  HTN-  added norvasc, HCTZ today.  H/o AAA repair-  Bradycardia-  Appears resolved.  DMII-  Avoid metformin for now.    Ronald Batista MD  07/16/21  15:00 EDT

## 2021-07-17 ENCOUNTER — READMISSION MANAGEMENT (OUTPATIENT)
Dept: CALL CENTER | Facility: HOSPITAL | Age: 71
End: 2021-07-17

## 2021-07-17 NOTE — OUTREACH NOTE
Prep Survey      Responses   [facility] facility patient discharged from?  [facility]   Is LACE score < 7 ?  No   Emergency Room discharge w/ pulse ox?  No   Eligibility  Readm Mgmt   Discharge diagnosis  LISSETTE   Does the patient have one of the following disease processes/diagnoses(primary or secondary)?  Other   Does the patient have Home health ordered?  No   Is there a DME ordered?  No   Comments regarding appointments  call for apmt   Medication alerts for this patient  see AVS   Prep survey completed?  Yes          Christine Bone RN

## 2021-07-20 ENCOUNTER — READMISSION MANAGEMENT (OUTPATIENT)
Dept: CALL CENTER | Facility: HOSPITAL | Age: 71
End: 2021-07-20

## 2021-07-20 NOTE — OUTREACH NOTE
Medical Week 1 Survey      Responses   [facility] patient discharged from?  [facility]   Does the patient have one of the following disease processes/diagnoses(primary or secondary)?  Other   Week 1 attempt successful?  Yes   Call start time  0832   Call end time  0835   Discharge diagnosis  LISSETTE   Meds reviewed with patient/caregiver?  Yes   Is the patient having any side effects they believe may be caused by any medication additions or changes?  No   Does the patient have all medications ordered at discharge?  Yes   Is the patient taking all medications as directed (includes completed medication regime)?  Yes   Does the patient have a primary care provider?   Yes   Does the patient have an appointment with their PCP within 7 days of discharge?  No   Comments regarding PCP  Saughter will call and make the appt.    What is preventing the patient from scheduling follow up appointments within 7 days of discharge?  Haven't had time   Nursing Interventions  Educated patient on importance of making appointment, Advised patient to make appointment [Pt reports his daughter will be calling today to make the appt . ]   Has the patient kept scheduled appointments due by today?  N/A   Psychosocial issues?  No   Did the patient receive a copy of their discharge instructions?  Yes   Nursing interventions  Reviewed instructions with patient   What is the patient's perception of their health status since discharge?  Improving   Is the patient/caregiver able to teach back signs and symptoms related to disease process for when to call PCP?  Yes   Is the patient/caregiver able to teach back signs and symptoms related to disease process for when to call 911?  Yes   Is the patient/caregiver able to teach back the hierarchy of who to call/visit for symptoms/problems? PCP, Specialist, Home health nurse, Urgent Care, ED, 911  Yes   If the patient is a current smoker, are they able to teach back resources for cessation?  Not a smoker    Week 1 call completed?  Yes          Larissa Fuchs RN

## 2021-07-28 ENCOUNTER — READMISSION MANAGEMENT (OUTPATIENT)
Dept: CALL CENTER | Facility: HOSPITAL | Age: 71
End: 2021-07-28

## 2021-07-28 NOTE — OUTREACH NOTE
Medical Week 2 Survey      Responses   [facility] patient discharged from?  [facility]   Does the patient have one of the following disease processes/diagnoses(primary or secondary)?  Other   Week 2 attempt successful?  Yes   Call start time  1429   Discharge diagnosis  LISSETTE   Call end time  1432   Person spoke with today (if not patient) and relationship  Patient   Meds reviewed with patient/caregiver?  Yes   Is the patient having any side effects they believe may be caused by any medication additions or changes?  No   Does the patient have all medications ordered at discharge?  Yes   Is the patient taking all medications as directed (includes completed medication regime)?  Yes   Does the patient have a primary care provider?   Yes   Does the patient have an appointment with their PCP within 7 days of discharge?  Yes   Has the patient kept scheduled appointments due by today?  N/A   Psychosocial issues?  No   What is the patient's perception of their health status since discharge?  Improving   Week 2 Call Completed?  Yes   Wrap up additional comments  Pt states he is doing better. No questions/concerns.          Ramandeep Singh RN

## 2021-08-05 ENCOUNTER — READMISSION MANAGEMENT (OUTPATIENT)
Dept: CALL CENTER | Facility: HOSPITAL | Age: 71
End: 2021-08-05

## 2021-08-05 NOTE — OUTREACH NOTE
Medical Week 3 Survey      Responses   [facility] patient discharged from?  [facility]   Does the patient have one of the following disease processes/diagnoses(primary or secondary)?  Other   Week 3 attempt successful?  Yes   Call start time  1556   Call end time  1606   Discharge diagnosis  LISSETTE   Meds reviewed with patient/caregiver?  Yes   Is the patient taking all medications as directed (includes completed medication regime)?  Yes   Comments regarding appointments  8/27/21 Urology appt   Does the patient have a primary care provider?   Yes   Comments regarding PCP  8/10/21 appt with PCP   Has the patient kept scheduled appointments due by today?  No   What is preventing the patient from keeping their appointments?  Transportation [Pt report appt had to be adore r/t transport issues( kids had to work). ]   Nursing Interventions  Advised patient to keep appointment [Offered to give Pt number to [facility] for transport, however Pt declined and stated his family will take him to appts. ]   Comments  Educated Pt on importance of following up with his Dr. Pt V/U. Pt reports BP has been normal and HR is mid 60's -70's. Pt reports that he has not checked his BS. He states he does have a glucometer and that his granddaughter will assist him in checking. Encouraged Pt to keep a log of readings for his PCP as his Metformin had been discontinued. Pt V/U.    Psychosocial issues?  No   Did the patient receive a copy of their discharge instructions?  Yes   Nursing interventions  Reviewed instructions with patient   What is the patient's perception of their health status since discharge?  Improving   Is the patient/caregiver able to teach back signs and symptoms related to disease process for when to call PCP?  Yes   Is the patient/caregiver able to teach back signs and symptoms related to disease process for when to call 911?  Yes   Is the patient/caregiver able to teach back the hierarchy of who to call/visit for  symptoms/problems? PCP, Specialist, Home health nurse, Urgent Care, ED, 911  Yes   Week 3 Call Completed?  Yes          Larissa Fuchs RN

## 2021-08-25 PROBLEM — N28.9 ACUTE RENAL INSUFFICIENCY: Status: ACTIVE | Noted: 2021-08-25

## 2021-09-03 ENCOUNTER — OFFICE VISIT (OUTPATIENT)
Dept: UROLOGY | Facility: CLINIC | Age: 71
End: 2021-09-03

## 2021-09-03 VITALS — RESPIRATION RATE: 17 BRPM | WEIGHT: 176 LBS | HEIGHT: 63 IN | BODY MASS INDEX: 31.18 KG/M2

## 2021-09-03 DIAGNOSIS — N28.9 ACUTE RENAL INSUFFICIENCY: Primary | ICD-10-CM

## 2021-09-03 LAB — URINE VOLUME: 79

## 2021-09-03 PROCEDURE — 99203 OFFICE O/P NEW LOW 30 MIN: CPT | Performed by: UROLOGY

## 2021-10-27 ENCOUNTER — OFFICE VISIT (OUTPATIENT)
Dept: CARDIOLOGY | Facility: CLINIC | Age: 71
End: 2021-10-27

## 2021-10-27 VITALS
WEIGHT: 174 LBS | HEART RATE: 82 BPM | HEIGHT: 63 IN | DIASTOLIC BLOOD PRESSURE: 71 MMHG | BODY MASS INDEX: 30.83 KG/M2 | SYSTOLIC BLOOD PRESSURE: 169 MMHG

## 2021-10-27 DIAGNOSIS — E78.5 HYPERLIPIDEMIA, UNSPECIFIED HYPERLIPIDEMIA TYPE: Chronic | ICD-10-CM

## 2021-10-27 DIAGNOSIS — N18.32 STAGE 3B CHRONIC KIDNEY DISEASE (HCC): Chronic | ICD-10-CM

## 2021-10-27 DIAGNOSIS — I25.118 CORONARY ARTERY DISEASE OF NATIVE ARTERY OF NATIVE HEART WITH STABLE ANGINA PECTORIS (HCC): ICD-10-CM

## 2021-10-27 DIAGNOSIS — I10 HYPERTENSION, ESSENTIAL: Primary | Chronic | ICD-10-CM

## 2021-10-27 DIAGNOSIS — I35.1 AORTIC VALVE INSUFFICIENCY, ETIOLOGY OF CARDIAC VALVE DISEASE UNSPECIFIED: Chronic | ICD-10-CM

## 2021-10-27 DIAGNOSIS — I25.10 CORONARY ARTERY DISEASE INVOLVING NATIVE CORONARY ARTERY OF NATIVE HEART WITHOUT ANGINA PECTORIS: ICD-10-CM

## 2021-10-27 PROCEDURE — 99214 OFFICE O/P EST MOD 30 MIN: CPT | Performed by: INTERNAL MEDICINE

## 2021-10-27 RX ORDER — ATORVASTATIN CALCIUM 20 MG/1
20 TABLET, FILM COATED ORAL DAILY
COMMUNITY
End: 2021-11-16 | Stop reason: DRUGHIGH

## 2021-10-27 RX ORDER — FUROSEMIDE 40 MG/1
40 TABLET ORAL EVERY OTHER DAY
Qty: 45 TABLET | Refills: 3 | Status: SHIPPED | OUTPATIENT
Start: 2021-10-27

## 2021-10-27 RX ORDER — METOPROLOL SUCCINATE 25 MG/1
25 TABLET, EXTENDED RELEASE ORAL DAILY
COMMUNITY
Start: 2021-10-06 | End: 2021-10-27 | Stop reason: ALTCHOICE

## 2021-10-27 RX ORDER — CLOPIDOGREL BISULFATE 75 MG/1
75 TABLET ORAL DAILY
COMMUNITY

## 2021-10-27 RX ORDER — AMLODIPINE BESYLATE 10 MG/1
5 TABLET ORAL DAILY
COMMUNITY

## 2021-10-27 RX ORDER — HYDROCHLOROTHIAZIDE 12.5 MG/1
25 CAPSULE, GELATIN COATED ORAL DAILY
COMMUNITY

## 2021-10-27 NOTE — ASSESSMENT & PLAN NOTE
Hypertension is needing tighter control.  In view of his acute renal failure I am reluctant to add an ACE or an ARB at least not for now.  He will continue the amlodipine 5 mg a day and will change his diuretic from 25 mg of hydrochlorothiazide to 40 mg of furosemide every other day.  We will need to watch his renal function closely.  He will maintain a 2-week blood pressure log starting this Sunday and bring it to us after 2 weeks

## 2021-10-27 NOTE — ASSESSMENT & PLAN NOTE
Coronary artery disease is present and multivessel.  He is status post multiple stents to his LAD and his circumflex intervention was unsuccessful.  He has not had any significant recurrent episodes of angina over the last several months

## 2021-10-27 NOTE — PROGRESS NOTES
Chief Complaint  Coronary Artery Disease    Subjective            Narciso Constantino presents to [facility] CARDIOLOGY  Mr. Constantino is a 71-year's old gentleman with coronary disease previous myocardial infarction status post abdominal aortic aneurysm repair was admitted to the hospital with acute renal failure and hyperkalemia.  His beta-blocker was stopped and his medications changed.  He is denying any chest pain shortness of breath dizziness syncope.      Past Medical History:   Diagnosis Date   • AAA (abdominal aortic aneurysm) ([facility]) 2017    REPAIRED   • Aortic valve regurgitation 2/20/2020    mild-to-moderate aortic regurgitation on echo 2/21/2020    • Chest pain    • Chronic bronchitis ([facility])    • COPD (chronic obstructive pulmonary disease) ([facility])    • Coronary artery disease    • Diabetes mellitus ([facility])    • Dyspnea    • Hypertension        No Known Allergies     Past Surgical History:   Procedure Laterality Date   • ABDOMINAL AORTIC ANEURYSM REPAIR, AORTIC BIFEMORAL ILIAC RENAL BYPASS N/A 7/18/2017    Procedure: BILATERAL FEMORAL CUT DOWN, AORTIC ILIOFEMORAL ARTERIOGRAM, BILATERAL ARTERY ANGIOPLASTY, GORE STENT GRAFT REPAIR OF RUPTURED AORTIC ANEURYSM;  Surgeon: Guevara Parisi MD;  Location: [facility] OR 18/19;  Service:    • CARDIAC CATHETERIZATION Left 7/27/2017    Procedure: Cardiac Catheterization/Vascular Study- radial approach only;  Surgeon: Ervin Kimbruogh MD;  Location: [facility] INVASIVE LOCATION;  Service:    • CARDIAC CATHETERIZATION N/A 7/27/2017    Procedure: Left Heart Cath;  Surgeon: Ervin Kimbrough MD;  Location: [facility] INVASIVE LOCATION;  Service:    • CARDIAC CATHETERIZATION N/A 7/27/2017    Procedure: Left ventriculography;  Surgeon: Ervin Kimbrough MD;  Location: [facility] INVASIVE LOCATION;  Service:    • CARDIAC CATHETERIZATION N/A 7/27/2017    Procedure: Coronary angiography;  Surgeon: Ervin Kimbrough MD;  Location: [facility] INVASIVE LOCATION;   Service:    • CARDIAC CATHETERIZATION N/A 2017    Procedure: Angioplasty-coronary;  Surgeon: Ervin Kimbrough MD;  Location:  [facility] CATH INVASIVE LOCATION;  Service:    • CARDIAC CATHETERIZATION N/A 2017    Procedure: Stent LUISANA coronary;  Surgeon: Ervin Kimbrough MD;  Location:  [facility] CATH INVASIVE LOCATION;  Service:    • NJ RT/LT HEART CATHETERS N/A 2017    Procedure: Percutaneous Coronary Intervention LAD;  Surgeon: Ervin Kimbrough MD;  Location:  [facility] CATH INVASIVE LOCATION;  Service: Cardiovascular        Social History     Tobacco Use   • Smoking status: Former Smoker     Packs/day: 2.00     Years: 50.00     Pack years: 100.00     Types: Cigarettes     Quit date: 2017     Years since quittin.2   • Smokeless tobacco: Never Used   Vaping Use   • Vaping Use: Never used   Substance Use Topics   • Alcohol use: No   • Drug use: No       Family History   Problem Relation Age of Onset   • No Known Problems Mother    • No Known Problems Father         Prior to Admission medications    Medication Sig Start Date End Date Taking? Authorizing Provider   albuterol (PROVENTIL HFA) 108 (90 BASE) MCG/ACT inhaler Inhale 2 puffs Every 4 (Four) Hours As Needed for Wheezing.   Yes Allen Hester MD   aspirin 81 MG chewable tablet Chew 1 tablet Daily. 17  Yes Ervin Kimbrough MD   atorvastatin (LIPITOR) 20 MG tablet Take 1 tablet by mouth Every Night. 17  Yes Steph Ford APRN   budesonide-formoterol (SYMBICORT) 160-4.5 MCG/ACT inhaler Inhale 2 puffs 2 (Two) Times a Day.   Yes Allen Hester MD   hydroCHLOROthiazide (MICROZIDE) 12.5 MG capsule Take 1 capsule by mouth Daily for 30 days.  Patient taking differently: Take 12.5 mg by mouth 2 (Two) Times a Day. 7/17/21 10/27/21 Yes Jhonny Mcnamara MD   metFORMIN (GLUCOPHAGE) 1000 MG tablet Take 1,000 mg by mouth 2 (Two) Times a Day With Meals.   Yes Allen Hester MD   metoprolol succinate XL (TOPROL-XL) 25 MG 24 hr tablet Take 25  "mg by mouth Daily. 10/6/21  Yes Provider, MD Allen   amLODIPine (NORVASC) 10 MG tablet Take 0.5 tablets by mouth Daily. 7/16/21   Jhonny Mcnamara MD   clopidogrel (PLAVIX) 75 MG tablet Take 1 tablet by mouth Daily. 8/9/17   Ervin Kimbrough MD        Review of Systems   Respiratory: Negative for cough and shortness of breath.    Cardiovascular: Negative for chest pain, palpitations and leg swelling.   Neurological: Negative for dizziness.        Objective     /71   Pulse 82   Ht 160 cm (63\")   Wt 78.9 kg (174 lb)   BMI 30.82 kg/m²       Physical Exam  Constitutional:       General: He is awake.      Appearance: Normal appearance.   Neck:      Thyroid: No thyromegaly.      Vascular: No carotid bruit or JVD.   Cardiovascular:      Rate and Rhythm: Normal rate and regular rhythm.      Chest Wall: PMI is not displaced.      Pulses: Normal pulses.      Heart sounds: S1 normal and S2 normal. Murmur heard.    Systolic murmur is present.  No friction rub. No gallop. No S3 or S4 sounds.    Pulmonary:      Effort: Pulmonary effort is normal.      Breath sounds: Normal breath sounds and air entry. No wheezing, rhonchi or rales.   Abdominal:      General: Bowel sounds are normal.      Palpations: Abdomen is soft. There is no mass.      Tenderness: There is no abdominal tenderness.   Musculoskeletal:      Cervical back: Neck supple.      Right lower leg: No edema.      Left lower leg: No edema.   Neurological:      Mental Status: He is alert and oriented to person, place, and time.   Psychiatric:         Mood and Affect: Mood normal.         Behavior: Behavior is cooperative.       Lab Results   Component Value Date    PROBNP 1,818.0 (H) 07/13/2021    PROBNP 6,255.0 (H) 07/18/2017     CMP    CMP 7/14/21 7/14/21 7/15/21 7/16/21 7/16/21    0253 1152  0453 1517   Glucose 112 (A) 166 (A) 92 96 102 (A)   BUN 51 (A) 39 (A) 22 19 24 (A)   Creatinine 2.46 (A) 2.05 (A) 1.64 (A) 1.57 (A) 1.65 (A)   eGFR Non African Am 26 (A) " 32 (A) 42 (A) 44 (A) 41 (A)   Sodium 140 143 141 136 139   Potassium 5.6 (A) 5.3 (A) 5.4 (A) 5.5 (A) 5.3 (A)   Chloride 111 (A) 112 (A) 113 (A) 106 104   Calcium 9.5 9.1 8.9 8.9 9.4   (A) Abnormal value       Comments are available for some flowsheets but are not being displayed.           CBC w/diff    CBC w/Diff 7/14/21 7/15/21 7/16/21   WBC 8.19 7.79 7.98   RBC 4.05 (A) 4.40 4.59   Hemoglobin 11.7 (A) 12.6 (A) 13.2   Hematocrit 37.5 40.8 42.2   MCV 92.6 92.7 91.9   MCH 28.9 28.6 28.8   MCHC 31.2 (A) 30.9 (A) 31.3 (A)   RDW 14.6 14.8 14.6   Platelets 190 195 194   Neutrophil Rel % 69.6     Immature Granulocyte Rel % 0.5     Lymphocyte Rel % 12.7 (A)     Monocyte Rel % 10.4     Eosinophil Rel % 6.2     Basophil Rel % 0.6     (A) Abnormal value                Lab Results   Component Value Date    TSH 2.360 07/13/2021      Lab Results   Component Value Date    FREET4 1.29 07/13/2021      D-Dimer, Quantitative   Date Value Ref Range Status   07/18/2017 >20.00 (H) 0.00 - 0.49 MCGFEU/mL Final     Magnesium   Date Value Ref Range Status   07/16/2021 2.0 1.6 - 2.4 mg/dL Final      No results found for: DIGOXIN              Result Review :                           Assessment and Plan        Diagnoses and all orders for this visit:    1. Hypertension, essential (Primary)  Assessment & Plan:  Hypertension is needing tighter control.  In view of his acute renal failure I am reluctant to add an ACE or an ARB at least not for now.  He will continue the amlodipine 5 mg a day and will change his diuretic from 25 mg of hydrochlorothiazide to 40 mg of furosemide every other day.  We will need to watch his renal function closely.  He will maintain a 2-week blood pressure log starting this Sunday and bring it to us after 2 weeks    Orders:  -     CBC (No Diff); Future  -     Lipid Panel; Future  -     Comprehensive Metabolic Panel; Future    2. Aortic valve insufficiency, etiology of cardiac valve disease unspecified  Assessment &  Plan:  HIs mild to moderate aortic valve regurgitation appears stable.    Orders:  -     CBC (No Diff); Future  -     Lipid Panel; Future  -     Comprehensive Metabolic Panel; Future    3. Stage 3b chronic kidney disease (HCC)  -     CBC (No Diff); Future  -     Lipid Panel; Future  -     Comprehensive Metabolic Panel; Future    4. Hyperlipidemia, unspecified hyperlipidemia type  -     CBC (No Diff); Future  -     Lipid Panel; Future  -     Comprehensive Metabolic Panel; Future    5. Coronary artery disease of native artery of native heart with stable angina pectoris (HCC)  -     CBC (No Diff); Future  -     Lipid Panel; Future  -     Comprehensive Metabolic Panel; Future    6. Coronary artery disease involving native coronary artery of native heart without angina pectoris  Assessment & Plan:  Coronary artery disease is present and multivessel.  He is status post multiple stents to his LAD and his circumflex intervention was unsuccessful.  He has not had any significant recurrent episodes of angina over the last several months      Other orders  -     furosemide (LASIX) 40 MG tablet; Take 1 tablet by mouth Every Other Day.  Dispense: 45 tablet; Refill: 3          Follow Up     Return in about 3 months (around 1/27/2022) for ok for thursday if necessary, Labs with outside lab ordered.    Patient was given instructions and counseling regarding his condition or for health maintenance advice. Please see specific information pulled into the AVS if appropriate.     Freida Quinones MD  10/27/21 @[facility]

## 2021-11-09 DIAGNOSIS — I25.118 CORONARY ARTERY DISEASE OF NATIVE ARTERY OF NATIVE HEART WITH STABLE ANGINA PECTORIS (HCC): ICD-10-CM

## 2021-11-09 DIAGNOSIS — I35.1 AORTIC VALVE INSUFFICIENCY, ETIOLOGY OF CARDIAC VALVE DISEASE UNSPECIFIED: Chronic | ICD-10-CM

## 2021-11-09 DIAGNOSIS — I25.10 CORONARY ARTERY DISEASE INVOLVING NATIVE CORONARY ARTERY OF NATIVE HEART WITHOUT ANGINA PECTORIS: ICD-10-CM

## 2021-11-09 DIAGNOSIS — I10 HYPERTENSION, ESSENTIAL: Chronic | ICD-10-CM

## 2021-11-09 DIAGNOSIS — E78.5 HYPERLIPIDEMIA, UNSPECIFIED HYPERLIPIDEMIA TYPE: Chronic | ICD-10-CM

## 2021-11-09 DIAGNOSIS — N18.32 STAGE 3B CHRONIC KIDNEY DISEASE (HCC): Chronic | ICD-10-CM

## 2021-11-16 DIAGNOSIS — E78.5 HYPERLIPIDEMIA, UNSPECIFIED HYPERLIPIDEMIA TYPE: Primary | ICD-10-CM

## 2021-11-16 RX ORDER — ATORVASTATIN CALCIUM 40 MG/1
40 TABLET, FILM COATED ORAL DAILY
Qty: 90 TABLET | Refills: 3 | Status: SHIPPED | OUTPATIENT
Start: 2021-11-16

## 2021-11-18 ENCOUNTER — TELEPHONE (OUTPATIENT)
Dept: CARDIOLOGY | Facility: CLINIC | Age: 71
End: 2021-11-18

## 2021-11-18 NOTE — TELEPHONE ENCOUNTER
----- Message from FLORENTIN Okeefe sent at 11/16/2021  9:59 AM EST -----  Cholesterols are slightly high.  Need LDL below 70.  Increase atorvastatin to 40 mg at night.  Repeat lipids and CMP in 3 months.

## 2021-11-22 ENCOUNTER — TELEPHONE (OUTPATIENT)
Dept: CARDIOLOGY | Facility: CLINIC | Age: 71
End: 2021-11-22

## 2023-01-09 RX ORDER — FUROSEMIDE 40 MG/1
40 TABLET ORAL EVERY OTHER DAY
Qty: 45 TABLET | Refills: 3 | OUTPATIENT
Start: 2023-01-09

## 2023-09-20 ENCOUNTER — HOSPITAL ENCOUNTER (OUTPATIENT)
Facility: HOSPITAL | Age: 73
Setting detail: OBSERVATION
LOS: 1 days | Discharge: HOME | End: 2023-09-22
Attending: HOSPITALIST | Admitting: HOSPITALIST
Payer: MEDICARE

## 2023-09-20 DIAGNOSIS — M54.50 LOW BACK PAIN, UNSPECIFIED BACK PAIN LATERALITY, UNSPECIFIED CHRONICITY, UNSPECIFIED WHETHER SCIATICA PRESENT: ICD-10-CM

## 2023-09-20 DIAGNOSIS — Z09 FOLLOW-UP EXAM: Primary | ICD-10-CM

## 2023-09-20 PROBLEM — Z98.890 HISTORY OF AAA (ABDOMINAL AORTIC ANEURYSM) REPAIR: Status: ACTIVE | Noted: 2023-09-20

## 2023-09-20 PROBLEM — M54.9 BACK PAIN: Status: ACTIVE | Noted: 2023-09-20

## 2023-09-20 PROCEDURE — G0379 DIRECT REFER HOSPITAL OBSERV: HCPCS

## 2023-09-20 PROCEDURE — G0378 HOSPITAL OBSERVATION PER HR: HCPCS

## 2023-09-20 RX ORDER — ATORVASTATIN CALCIUM 20 MG/1
40 TABLET, FILM COATED ORAL DAILY
Status: DISCONTINUED | OUTPATIENT
Start: 2023-09-21 | End: 2023-09-23 | Stop reason: HOSPADM

## 2023-09-20 RX ORDER — POLYETHYLENE GLYCOL 3350 17 G/17G
17 POWDER, FOR SOLUTION ORAL DAILY PRN
Status: DISCONTINUED | OUTPATIENT
Start: 2023-09-20 | End: 2023-09-21

## 2023-09-20 RX ORDER — INSULIN LISPRO 100 [IU]/ML
2-7 INJECTION, SOLUTION INTRAVENOUS; SUBCUTANEOUS
Status: DISCONTINUED | OUTPATIENT
Start: 2023-09-21 | End: 2023-09-23 | Stop reason: HOSPADM

## 2023-09-20 RX ORDER — DEXTROSE MONOHYDRATE 25 G/50ML
25 INJECTION, SOLUTION INTRAVENOUS
Status: DISCONTINUED | OUTPATIENT
Start: 2023-09-20 | End: 2023-09-23 | Stop reason: HOSPADM

## 2023-09-20 RX ORDER — NITROGLYCERIN 0.4 MG/1
0.4 TABLET SUBLINGUAL
Status: DISCONTINUED | OUTPATIENT
Start: 2023-09-20 | End: 2023-09-23 | Stop reason: HOSPADM

## 2023-09-20 RX ORDER — IPRATROPIUM BROMIDE AND ALBUTEROL SULFATE 2.5; .5 MG/3ML; MG/3ML
3 SOLUTION RESPIRATORY (INHALATION)
Status: DISCONTINUED | OUTPATIENT
Start: 2023-09-21 | End: 2023-09-23 | Stop reason: HOSPADM

## 2023-09-20 RX ORDER — FUROSEMIDE 40 MG/1
40 TABLET ORAL EVERY OTHER DAY
Status: DISCONTINUED | OUTPATIENT
Start: 2023-09-21 | End: 2023-09-23 | Stop reason: HOSPADM

## 2023-09-20 RX ORDER — ONDANSETRON 4 MG/1
4 TABLET, FILM COATED ORAL EVERY 6 HOURS PRN
Status: DISCONTINUED | OUTPATIENT
Start: 2023-09-20 | End: 2023-09-23 | Stop reason: HOSPADM

## 2023-09-20 RX ORDER — SODIUM CHLORIDE 9 MG/ML
100 INJECTION, SOLUTION INTRAVENOUS CONTINUOUS
Status: DISCONTINUED | OUTPATIENT
Start: 2023-09-21 | End: 2023-09-23 | Stop reason: HOSPADM

## 2023-09-20 RX ORDER — BUDESONIDE AND FORMOTEROL FUMARATE DIHYDRATE 160; 4.5 UG/1; UG/1
2 AEROSOL RESPIRATORY (INHALATION)
Status: DISCONTINUED | OUTPATIENT
Start: 2023-09-21 | End: 2023-09-23 | Stop reason: HOSPADM

## 2023-09-20 RX ORDER — ONDANSETRON 2 MG/ML
4 INJECTION INTRAMUSCULAR; INTRAVENOUS EVERY 6 HOURS PRN
Status: DISCONTINUED | OUTPATIENT
Start: 2023-09-20 | End: 2023-09-23 | Stop reason: HOSPADM

## 2023-09-20 RX ORDER — AMLODIPINE BESYLATE 5 MG/1
5 TABLET ORAL DAILY
Status: DISCONTINUED | OUTPATIENT
Start: 2023-09-21 | End: 2023-09-23 | Stop reason: HOSPADM

## 2023-09-20 RX ORDER — IBUPROFEN 600 MG/1
1 TABLET ORAL
Status: DISCONTINUED | OUTPATIENT
Start: 2023-09-20 | End: 2023-09-23 | Stop reason: HOSPADM

## 2023-09-20 RX ORDER — SODIUM CHLORIDE 0.9 % (FLUSH) 0.9 %
10 SYRINGE (ML) INJECTION AS NEEDED
Status: DISCONTINUED | OUTPATIENT
Start: 2023-09-20 | End: 2023-09-23 | Stop reason: HOSPADM

## 2023-09-20 RX ORDER — AMOXICILLIN 250 MG
2 CAPSULE ORAL 2 TIMES DAILY
Status: DISCONTINUED | OUTPATIENT
Start: 2023-09-20 | End: 2023-09-21

## 2023-09-20 RX ORDER — BISACODYL 10 MG
10 SUPPOSITORY, RECTAL RECTAL DAILY PRN
Status: DISCONTINUED | OUTPATIENT
Start: 2023-09-20 | End: 2023-09-21

## 2023-09-20 RX ORDER — BISACODYL 5 MG/1
5 TABLET, DELAYED RELEASE ORAL DAILY PRN
Status: DISCONTINUED | OUTPATIENT
Start: 2023-09-20 | End: 2023-09-21

## 2023-09-20 RX ORDER — CLOPIDOGREL BISULFATE 75 MG/1
75 TABLET ORAL DAILY
Status: DISCONTINUED | OUTPATIENT
Start: 2023-09-21 | End: 2023-09-23 | Stop reason: HOSPADM

## 2023-09-20 RX ORDER — SODIUM CHLORIDE 0.9 % (FLUSH) 0.9 %
10 SYRINGE (ML) INJECTION EVERY 12 HOURS SCHEDULED
Status: DISCONTINUED | OUTPATIENT
Start: 2023-09-20 | End: 2023-09-23 | Stop reason: HOSPADM

## 2023-09-20 RX ORDER — ACETAMINOPHEN 650 MG/1
650 SUPPOSITORY RECTAL EVERY 4 HOURS PRN
Status: DISCONTINUED | OUTPATIENT
Start: 2023-09-20 | End: 2023-09-23 | Stop reason: HOSPADM

## 2023-09-20 RX ORDER — ACETAMINOPHEN 325 MG/1
650 TABLET ORAL EVERY 4 HOURS PRN
Status: DISCONTINUED | OUTPATIENT
Start: 2023-09-20 | End: 2023-09-23 | Stop reason: HOSPADM

## 2023-09-20 RX ORDER — ASPIRIN 81 MG/1
81 TABLET, CHEWABLE ORAL DAILY
Status: DISCONTINUED | OUTPATIENT
Start: 2023-09-21 | End: 2023-09-23 | Stop reason: HOSPADM

## 2023-09-20 RX ORDER — NICOTINE POLACRILEX 4 MG
15 LOZENGE BUCCAL
Status: DISCONTINUED | OUTPATIENT
Start: 2023-09-20 | End: 2023-09-23 | Stop reason: HOSPADM

## 2023-09-20 RX ORDER — ACETAMINOPHEN 160 MG/5ML
650 SOLUTION ORAL EVERY 4 HOURS PRN
Status: DISCONTINUED | OUTPATIENT
Start: 2023-09-20 | End: 2023-09-23 | Stop reason: HOSPADM

## 2023-09-20 RX ORDER — SODIUM CHLORIDE 9 MG/ML
40 INJECTION, SOLUTION INTRAVENOUS AS NEEDED
Status: DISCONTINUED | OUTPATIENT
Start: 2023-09-20 | End: 2023-09-23 | Stop reason: HOSPADM

## 2023-09-20 RX ADMIN — SENNOSIDES AND DOCUSATE SODIUM 2 TABLET: 50; 8.6 TABLET ORAL at 23:09

## 2023-09-20 RX ADMIN — ACETAMINOPHEN 650 MG: 325 TABLET, FILM COATED ORAL at 23:11

## 2023-09-21 ENCOUNTER — APPOINTMENT (OUTPATIENT)
Dept: OTHER | Facility: HOSPITAL | Age: 73
End: 2023-09-21
Payer: MEDICARE

## 2023-09-21 LAB
ANION GAP SERPL CALCULATED.3IONS-SCNC: 8.6 MMOL/L (ref 5–15)
BUN SERPL-MCNC: 16 MG/DL (ref 8–23)
BUN/CREAT SERPL: 13.4 (ref 7–25)
CALCIUM SPEC-SCNC: 8.6 MG/DL (ref 8.6–10.5)
CHLORIDE SERPL-SCNC: 110 MMOL/L (ref 98–107)
CO2 SERPL-SCNC: 19.4 MMOL/L (ref 22–29)
CREAT SERPL-MCNC: 1.19 MG/DL (ref 0.76–1.27)
DEPRECATED RDW RBC AUTO: 43.2 FL (ref 37–54)
EGFRCR SERPLBLD CKD-EPI 2021: 64.5 ML/MIN/1.73
ERYTHROCYTE [DISTWIDTH] IN BLOOD BY AUTOMATED COUNT: 13.3 % (ref 12.3–15.4)
GLUCOSE BLDC GLUCOMTR-MCNC: 107 MG/DL (ref 70–130)
GLUCOSE BLDC GLUCOMTR-MCNC: 109 MG/DL (ref 70–130)
GLUCOSE BLDC GLUCOMTR-MCNC: 165 MG/DL (ref 70–130)
GLUCOSE BLDC GLUCOMTR-MCNC: 82 MG/DL (ref 70–130)
GLUCOSE SERPL-MCNC: 97 MG/DL (ref 65–99)
HCT VFR BLD AUTO: 25.4 % (ref 37.5–51)
HGB BLD-MCNC: 8.2 G/DL (ref 13–17.7)
MCH RBC QN AUTO: 28.6 PG (ref 26.6–33)
MCHC RBC AUTO-ENTMCNC: 32.3 G/DL (ref 31.5–35.7)
MCV RBC AUTO: 88.5 FL (ref 79–97)
PLATELET # BLD AUTO: 207 10*3/MM3 (ref 140–450)
PMV BLD AUTO: 9.9 FL (ref 6–12)
POTASSIUM SERPL-SCNC: 4.2 MMOL/L (ref 3.5–5.2)
RBC # BLD AUTO: 2.87 10*6/MM3 (ref 4.14–5.8)
SODIUM SERPL-SCNC: 138 MMOL/L (ref 136–145)
WBC NRBC COR # BLD: 7.67 10*3/MM3 (ref 3.4–10.8)

## 2023-09-21 PROCEDURE — 63710000001 INSULIN LISPRO (HUMAN) PER 5 UNITS: Performed by: HOSPITALIST

## 2023-09-21 PROCEDURE — 96361 HYDRATE IV INFUSION ADD-ON: CPT

## 2023-09-21 PROCEDURE — 96360 HYDRATION IV INFUSION INIT: CPT

## 2023-09-21 PROCEDURE — 82948 REAGENT STRIP/BLOOD GLUCOSE: CPT

## 2023-09-21 PROCEDURE — G0378 HOSPITAL OBSERVATION PER HR: HCPCS

## 2023-09-21 PROCEDURE — 80048 BASIC METABOLIC PNL TOTAL CA: CPT | Performed by: NURSE PRACTITIONER

## 2023-09-21 PROCEDURE — 85027 COMPLETE CBC AUTOMATED: CPT | Performed by: NURSE PRACTITIONER

## 2023-09-21 RX ORDER — POLYETHYLENE GLYCOL 3350 17 G/17G
17 POWDER, FOR SOLUTION ORAL 2 TIMES DAILY
Status: DISCONTINUED | OUTPATIENT
Start: 2023-09-21 | End: 2023-09-23 | Stop reason: HOSPADM

## 2023-09-21 RX ORDER — HYDROCODONE BITARTRATE AND ACETAMINOPHEN 5; 325 MG/1; MG/1
1 TABLET ORAL ONCE
Status: COMPLETED | OUTPATIENT
Start: 2023-09-21 | End: 2023-09-21

## 2023-09-21 RX ADMIN — SODIUM CHLORIDE 100 ML/HR: 9 INJECTION, SOLUTION INTRAVENOUS at 02:30

## 2023-09-21 RX ADMIN — INSULIN LISPRO 2 UNITS: 100 INJECTION, SOLUTION INTRAVENOUS; SUBCUTANEOUS at 17:11

## 2023-09-21 RX ADMIN — ASPIRIN 81 MG: 81 TABLET, CHEWABLE ORAL at 08:55

## 2023-09-21 RX ADMIN — Medication 10 ML: at 22:00

## 2023-09-21 RX ADMIN — MAGNESIUM HYDROXIDE 15 ML: 1200 LIQUID ORAL at 14:39

## 2023-09-21 RX ADMIN — POLYETHYLENE GLYCOL 3350 17 G: 17 POWDER, FOR SOLUTION ORAL at 22:00

## 2023-09-21 RX ADMIN — HYDROCODONE BITARTRATE AND ACETAMINOPHEN 1 TABLET: 5; 325 TABLET ORAL at 21:34

## 2023-09-21 RX ADMIN — CLOPIDOGREL BISULFATE 75 MG: 75 TABLET, FILM COATED ORAL at 08:55

## 2023-09-21 RX ADMIN — ATORVASTATIN CALCIUM 40 MG: 20 TABLET, FILM COATED ORAL at 08:55

## 2023-09-21 RX ADMIN — ACETAMINOPHEN 650 MG: 325 TABLET, FILM COATED ORAL at 14:38

## 2023-09-21 RX ADMIN — SODIUM CHLORIDE 100 ML/HR: 9 INJECTION, SOLUTION INTRAVENOUS at 11:38

## 2023-09-21 NOTE — CONSULTS
"Name: Narciso Constantino ADMIT: 2023   : 1950  PCP: Ariella Rocha DO    MRN: [id number] LOS: 1 days   AGE/SEX: 73 y.o. male  ROOM: [address]      Patient Care Team:  Ariella Rocha DO as PCP - General (Family Medicine)  No chief complaint on file.    CC:abdominal aortic aneurysm, abdominal pain, concern for \"stent migration\"    Subjective     Inpatient Vascular Surgery Consult  Consult performed by: Christian Silveira II, MD  Consult ordered by: Heydi Washington APRN      History of Present Illness  Patient is a pleasant 73 year old gentleman with history of COPD, diabetes, and ruptured abdominal aortic aneurysm that was repaired with GORE endovascular stent graft repair in 2017 by Dr. Parisi and Dr. Waterman here. Patient reports he has had ~3 weeks of intermittent back pain initially associated with abnormally black stools that has now resolved and began having left lower quadrant abdominal pain yesterday. Patient reports it was sudden onset, severe in nature but fluctuating in intensity since then with no known palliating or aggravating factors. He associates this with some constipation and states he has not had a bowel movement in 2 days which is abnormal for him. He states he has never had this pain before. He denies fever, chills, chest pain, or dyspnea.   He was evaluated in [facility] yesterday where a CT scan was concerning for 'stent migration\". I've read the CT report but images are not yet uploaded. It notes his previous AAA is 6.2cm in size and extends above the renal arteries but does not mention presence of an endoleak.   On exam the patient has some mild tenderness in left lower quadrant. I can not palpable a pulsatile abdominal mass and the periumbilical abdomen is very mildly tender but less so than the left lower quadrant. He does not have peritonitis. He is otherwise resting comfortably in bed.       Review of Systems   Constitutional:  Negative for " chills and fever.   Respiratory:  Negative for shortness of breath.    Cardiovascular:  Negative for chest pain.   Gastrointestinal:  Positive for abdominal pain and constipation. Negative for diarrhea, nausea and vomiting.        Dark stools noted 3 weeks ago, now resolved.    Musculoskeletal:  Positive for back pain.   Neurological:  Negative for syncope, weakness and numbness.     Past Medical History:   Diagnosis Date    AAA (abdominal aortic aneurysm) 2017    REPAIRED    Aortic valve regurgitation 2/20/2020    mild-to-moderate aortic regurgitation on echo 2/21/2020     Chest pain     Chronic bronchitis     COPD (chronic obstructive pulmonary disease)     Coronary artery disease     Diabetes mellitus     Dyspnea     Hypertension      Past Surgical History:   Procedure Laterality Date    ABDOMINAL AORTIC ANEURYSM REPAIR, AORTIC BIFEMORAL ILIAC RENAL BYPASS N/A 7/18/2017    Procedure: BILATERAL FEMORAL CUT DOWN, AORTIC ILIOFEMORAL ARTERIOGRAM, BILATERAL ARTERY ANGIOPLASTY, GORE STENT GRAFT REPAIR OF RUPTURED AORTIC ANEURYSM;  Surgeon: Guevara Parisi MD;  Location: [facility] OR 18/19;  Service:     CARDIAC CATHETERIZATION Left 7/27/2017    Procedure: Cardiac Catheterization/Vascular Study- radial approach only;  Surgeon: Ervin Kimbrough MD;  Location: [facility] INVASIVE LOCATION;  Service:     CARDIAC CATHETERIZATION N/A 7/27/2017    Procedure: Left Heart Cath;  Surgeon: Ervin Kimbrough MD;  Location: [facility] INVASIVE LOCATION;  Service:     CARDIAC CATHETERIZATION N/A 7/27/2017    Procedure: Left ventriculography;  Surgeon: Ervin Kimbrough MD;  Location: [facility] INVASIVE LOCATION;  Service:     CARDIAC CATHETERIZATION N/A 7/27/2017    Procedure: Coronary angiography;  Surgeon: Ervin Kimbrough MD;  Location: [facility] INVASIVE LOCATION;  Service:     CARDIAC CATHETERIZATION N/A 7/27/2017    Procedure: Angioplasty-coronary;  Surgeon: Ervin Kimbrough MD;  Location: [facility] INVASIVE LOCATION;   Service:     CARDIAC CATHETERIZATION N/A 2017    Procedure: Stent LUISANA coronary;  Surgeon: Ervin Kimbrough MD;  Location:  [facility] INVAS[facility] LOCATION;  Service:     KS RT/LT HEART CATHETERS N/A 2017    Procedure: Percutaneous Coronary Intervention LAD;  Surgeon: Ervin Kimbrough MD;  Location:  [facility] INVASIVE LOCATION;  Service: Cardiovascular     Family History   Problem Relation Age of Onset    No Known Problems Mother     No Known Problems Father        Social History     Tobacco Use    Smoking status: Former     Packs/day: 2.00     Years: 50.00     Pack years: 100.00     Types: Cigarettes     Quit date: 2017     Years since quittin.1    Smokeless tobacco: Never   Vaping Use    Vaping Use: Never used   Substance Use Topics    Alcohol use: No    Drug use: No     Medications Prior to Admission   Medication Sig Dispense Refill Last Dose    amLODIPine (NORVASC) 10 MG tablet Take 0.5 tablets by mouth Daily. Take 1/2 tablet by mouth every day   2023    aspirin 81 MG chewable tablet Chew 1 tablet Daily.  11 2023    atorvastatin (LIPITOR) 40 MG tablet Take 1 tablet by mouth Daily. 90 tablet 3 2023    clopidogrel (PLAVIX) 75 MG tablet Take 1 tablet by mouth Daily.   2023    furosemide (LASIX) 40 MG tablet Take 1 tablet by mouth Every Other Day. 45 tablet 3 2023    hydroCHLOROthiazide (MICROZIDE) 12.5 MG capsule Take 2 capsules by mouth Daily. Take 2 capsules by mouth every morning   2023    metFORMIN (GLUCOPHAGE) 1000 MG tablet Take 1 tablet by mouth 2 (Two) Times a Day With Meals.   2023    albuterol (PROVENTIL HFA) 108 (90 BASE) MCG/ACT inhaler Inhale 2 puffs Every 4 (Four) Hours As Needed for Wheezing.       budesonide-formoterol (SYMBICORT) 160-4.5 MCG/ACT inhaler Inhale 2 puffs 2 (Two) Times a Day.        amLODIPine, 5 mg, Oral, Daily  aspirin, 81 mg, Oral, Daily  atorvastatin, 40 mg, Oral, Daily  budesonide-formoterol, 2 puff, Inhalation, BID -  RT  clopidogrel, 75 mg, Oral, Daily  furosemide, 40 mg, Oral, Every Other Day  insulin lispro, 2-7 Units, Subcutaneous, 4x Daily AC & at Bedtime  ipratropium-albuterol, 3 mL, Nebulization, 4x Daily - RT  senna-docusate sodium, 2 tablet, Oral, BID  sodium chloride, 10 mL, Intravenous, Q12H      sodium chloride, 100 mL/hr, Last Rate: 100 mL/hr (09/21/23 0230)        acetaminophen **OR** acetaminophen **OR** acetaminophen    senna-docusate sodium **AND** polyethylene glycol **AND** bisacodyl **AND** bisacodyl    dextrose    dextrose    glucagon (human recombinant)    nitroglycerin    ondansetron **OR** ondansetron    sodium chloride    sodium chloride  Patient has no known allergies.    Objective     Physical Exam:  Physical Exam  Vitals reviewed.   Constitutional:       General: He is not in acute distress.     Appearance: He is not ill-appearing, toxic-appearing or diaphoretic.   HENT:      Head: Normocephalic and atraumatic.      Ears:      Comments: Hard of hearing  Eyes:      General: No scleral icterus.  Cardiovascular:      Rate and Rhythm: Normal rate and regular rhythm.   Pulmonary:      Effort: Pulmonary effort is normal. No respiratory distress.   Abdominal:      Palpations: Abdomen is soft.      Comments: Tenderness in left lower quadrant, no guarding.    Musculoskeletal:      Cervical back: No tenderness.   Skin:     General: Skin is warm and dry.   Neurological:      Mental Status: He is alert and oriented to person, place, and time.   Psychiatric:         Mood and Affect: Mood normal.         Behavior: Behavior normal.        Vital Signs and Labs:  Vital Signs Patient Vitals for the past 24 hrs:   BP Temp Temp src Pulse Resp SpO2 Weight   09/21/23 0700 147/74 98.4 °F (36.9 °C) Oral -- 18 -- --   09/21/23 0558 -- -- -- -- -- -- 59.2 kg (130 lb 8 oz)   09/21/23 0104 100/60 98.5 °F (36.9 °C) Oral 63 18 95 % --   09/21/23 0100 -- -- -- 59 -- -- --   09/20/23 2241 -- -- -- -- -- -- 59.2 kg (130 lb 8 oz)    09/20/23 2227 143/66 97.5 °F (36.4 °C) Oral 81 18 96 % --     I/O:  I/O last 3 completed shifts:  In: 360 [P.O.:360]  Out: 525 [Urine:525]    CBC    Results from last 7 days   Lab Units 09/21/23  0603   WBC 10*3/mm3 7.67   HEMOGLOBIN g/dL 8.2*   PLATELETS 10*3/mm3 207     BMP   Results from last 7 days   Lab Units 09/21/23  0603   SODIUM mmol/L 138   POTASSIUM mmol/L 4.2   CHLORIDE mmol/L 110*   CO2 mmol/L 19.4*   BUN mg/dL 16   CREATININE mg/dL 1.19   GLUCOSE mg/dL 97     Cr Clearance CrCl cannot be calculated (Unknown ideal weight.).  Coag     HbA1C No results found for: HGBA1C  Blood Glucose   Glucose   Date/Time Value Ref Range Status   09/21/2023 0622 107 70 - 130 mg/dL Final     Infection     CMP   Results from last 7 days   Lab Units 09/21/23  0603   SODIUM mmol/L 138   POTASSIUM mmol/L 4.2   CHLORIDE mmol/L 110*   CO2 mmol/L 19.4*   BUN mg/dL 16   CREATININE mg/dL 1.19   GLUCOSE mg/dL 97     ABG      UA      SOSA  No results found for: POCMETH, POCAMPHET, POCBARBITUR, POCBENZO, POCCOCAINE, POCOPIATES, POCOXYCODO, POCPHENCYC, POCPROPOXY, POCTHC, POCTRICYC  Lysis Labs   Results from last 7 days   Lab Units 09/21/23  0603   HEMOGLOBIN g/dL 8.2*   PLATELETS 10*3/mm3 207   CREATININE mg/dL 1.19     Radiology(recent) No radiology results for the last day    Active Hospital Problems    Diagnosis  POA    **History of AAA (abdominal aortic aneurysm) repair [Z98.890]  Not Applicable    Back pain [M54.9]  Yes    Hypertension, essential [I10]  Yes    Stage 3b chronic kidney disease [N18.32]  Yes    Coronary artery disease involving native coronary artery of native heart without angina pectoris [I25.10]  Yes    Chronic bronchitis [J42]  Yes      Resolved Hospital Problems   No resolved problems to display.     Problem Points:  4:  Patient has a new problem, with additional work-up planned  Total problem points:4 or more    Data Points:  1:  I have reviewed or order clinical lab test  2:  I have reviewed and summation of  "old records and/or discussed the patients care with another health care provider  Total data points:3    Risk Points:  Moderate:  Acute compliated injury    MDM requires 2/3 (Problem points, Data points and Risk)  MDM Prob point Data point Risk   SF 1 1 Minimal   Low 2 2 Low   Mod 3 3 Moderate   High 4 4 High     Code requires 3/3 (MDM, History and Exam)  Code MDM History Exam Time   43417 SF/Low Detailed Detailed 30   31625 Mod Comprehensive Comprehensive 50   48920 High Comprehensive Comprehensive 70     Detailed history:  4 elements HPI or status of 3 chronic problems; 2-9 system ROS  Comprehensive:  4 elements HPI or status of 3 chronic problems;  10 system ROS    Detailed Exam:  12 findings from any organ system  Comprehensive Exam:  2 findings from each of 9 systems.   [id number]    Assessment & Plan       History of AAA (abdominal aortic aneurysm) repair    Coronary artery disease involving native coronary artery of native heart without angina pectoris    Chronic bronchitis    Stage 3b chronic kidney disease    Hypertension, essential    Back pain      73 y.o. male with abdominal pain and back pain and known hx of AAA status post endovascular repair with CT concern for \"stent migration\".   Patients symptoms and exam are fairly non-specific but I do not think he has a symptomatic aneurysm based on his physical exam which is most tender in the left lower quadrant, away from the aorta.   I have delivered the CT from [place] to radiology to upload and will review the images. It sounds from the report like he has had aneurysmal degeneration of the suprarenal aorta but there is no mention of an endoleak so I'm not sure if the infrarenal portion is still successfully excluded or not.   Will follow up CT scan once it is uploaded.   Will order bowel regimen in the meantime.   Please keep NPO until CT scan is reviewed.     I discussed the patients findings and my recommendations with patient.    Christian Silveira II, " MD  09/21/23  08:02 EDT    Please call my office with any question: [phone]

## 2023-09-21 NOTE — PLAN OF CARE
Problem: Adult Inpatient Plan of Care  Goal: Plan of Care Review  Outcome: Ongoing, Progressing  Goal: Patient-Specific Goal (Individualized)  Outcome: Ongoing, Progressing  Goal: Absence of Hospital-Acquired Illness or Injury  Outcome: Ongoing, Progressing  Intervention: Identify and Manage Fall Risk  Recent Flowsheet Documentation  Taken 9/20/2023 2317 by Snow Esteban RN  Safety Promotion/Fall Prevention:   safety round/check completed   nonskid shoes/slippers when out of bed   lighting adjusted   fall prevention program maintained   clutter free environment maintained  Intervention: Prevent Skin Injury  Recent Flowsheet Documentation  Taken 9/20/2023 2317 by Snow Esteban RN  Body Position: position changed independently  Intervention: Prevent and Manage VTE (Venous Thromboembolism) Risk  Recent Flowsheet Documentation  Taken 9/20/2023 2317 by Snow Esteban RN  Activity Management: activity encouraged  Range of Motion: active ROM (range of motion) encouraged  Goal: Optimal Comfort and Wellbeing  Outcome: Ongoing, Progressing  Intervention: Monitor Pain and Promote Comfort  Recent Flowsheet Documentation  Taken 9/20/2023 2317 by Snow Esteban RN  Pain Management Interventions:   see MAR   quiet environment facilitated   pillow support provided   position adjusted   care clustered  Intervention: Provide Person-Centered Care  Recent Flowsheet Documentation  Taken 9/20/2023 2317 by Snow Esteban RN  Trust Relationship/Rapport:   care explained   choices provided   emotional support provided   empathic listening provided   questions answered  Goal: Readiness for Transition of Care  Outcome: Ongoing, Progressing     Problem: Fall Injury Risk  Goal: Absence of Fall and Fall-Related Injury  Outcome: Ongoing, Progressing  Intervention: Promote Injury-Free Environment  Recent Flowsheet Documentation  Taken 9/20/2023 2317 by Snow Esteban RN  Safety Promotion/Fall Prevention:   safety round/check  completed   nonskid shoes/slippers when out of bed   lighting adjusted   fall prevention program maintained   clutter free environment maintained     Problem: Pain Acute  Goal: Acceptable Pain Control and Functional Ability  Outcome: Ongoing, Progressing  Intervention: Develop Pain Management Plan  Recent Flowsheet Documentation  Taken 9/20/2023 2317 by Snow Esteban RN  Pain Management Interventions:   see MAR   quiet environment facilitated   pillow support provided   position adjusted   care clustered  Intervention: Optimize Psychosocial Wellbeing  Recent Flowsheet Documentation  Taken 9/20/2023 2317 by Snow Esteban RN  Diversional Activities: television     Problem: COPD (Chronic Obstructive Pulmonary Disease) Comorbidity  Goal: Maintenance of COPD Symptom Control  Outcome: Ongoing, Progressing     Problem: Diabetes Comorbidity  Goal: Blood Glucose Level Within Targeted Range  Outcome: Ongoing, Progressing  Intervention: Monitor and Manage Glycemia  Recent Flowsheet Documentation  Taken 9/20/2023 2317 by Snow Esteban RN  Glycemic Management: blood glucose monitored     Problem: Hypertension Comorbidity  Goal: Blood Pressure in Desired Range  Outcome: Ongoing, Progressing     Problem: Pain Chronic (Persistent) (Comorbidity Management)  Goal: Acceptable Pain Control and Functional Ability  Outcome: Ongoing, Progressing  Intervention: Develop Pain Management Plan  Recent Flowsheet Documentation  Taken 9/20/2023 2317 by Snow Esteban RN  Pain Management Interventions:   see MAR   quiet environment facilitated   pillow support provided   position adjusted   care clustered  Intervention: Optimize Psychosocial Wellbeing  Recent Flowsheet Documentation  Taken 9/20/2023 2317 by Snow Esteban RN  Diversional Activities: television  Family/Support System Care:   support provided   self-care encouraged   Goal Outcome Evaluation:  Patient's VSS, A&OX4, patient is a stand by assist. Patient ate a turkey  box before midnight and is now resting with call light in reach.

## 2023-09-21 NOTE — PLAN OF CARE
Goal Outcome Evaluation:  Plan of Care Reviewed With: patient        Progress: no change  Outcome Evaluation: sinus rhythm, room air, A/Ox4, standby assist. Consistent card diet. Vascular Sx consulted. CT images pending further evaluation by Vascular Sx. PRN Tylenol given for pain management.

## 2023-09-21 NOTE — PROGRESS NOTES
Name: Narciso Constantino ADMIT: 2023   : 1950  PCP: Ariella Rocha DO    MRN: [id number] LOS: 1 days   AGE/SEX: 73 y.o. male  ROOM: [facility]     Subjective   Subjective   Patient asleep on my arrival but easily awakens to voice.  Reports he is still having some left lower quadrant pain and that he has yet to have a bowel movement.       Objective   Objective   Vital Signs  Temp:  [97.5 °F (36.4 °C)-98.5 °F (36.9 °C)] 98.2 °F (36.8 °C)  Heart Rate:  [59-81] 63  Resp:  [18] 18  BP: (100-147)/(60-74) 147/74  SpO2:  [95 %-96 %] 95 %  on   ;   Device (Oxygen Therapy): room air  Body mass index is 23.12 kg/m².  Physical Exam  Constitutional:       General: He is not in acute distress.     Appearance: He is ill-appearing (chronically). He is not toxic-appearing.   Cardiovascular:      Rate and Rhythm: Normal rate and regular rhythm.   Pulmonary:      Effort: Pulmonary effort is normal. No respiratory distress.      Breath sounds: Normal breath sounds. No wheezing.   Abdominal:      General: Abdomen is flat. There is no distension.      Palpations: Abdomen is soft.      Tenderness: There is abdominal tenderness.   Musculoskeletal:         General: No tenderness.      Right lower leg: No edema.      Left lower leg: No edema.   Skin:     General: Skin is warm and dry.   Neurological:      General: No focal deficit present.      Mental Status: He is alert and oriented to person, place, and time. Mental status is at baseline.      Motor: No weakness.       Results Review     I reviewed the patient's new clinical results.  Results from last 7 days   Lab Units 23  0603   WBC 10*3/mm3 7.67   HEMOGLOBIN g/dL 8.2*   PLATELETS 10*3/mm3 207     Results from last 7 days   Lab Units 23  0603   SODIUM mmol/L 138   POTASSIUM mmol/L 4.2   CHLORIDE mmol/L 110*   CO2 mmol/L 19.4*   BUN mg/dL 16   CREATININE mg/dL 1.19   GLUCOSE mg/dL 97   Estimated Creatinine Clearance: 46.3 mL/min (by C-G formula based on SCr  of 1.19 mg/dL).    Results from last 7 days   Lab Units 09/21/23  0603   CALCIUM mg/dL 8.6     No results found for: COVID19  Glucose   Date/Time Value Ref Range Status   09/21/2023 1108 109 70 - 130 mg/dL Final   09/21/2023 0622 107 70 - 130 mg/dL Final       US Outside Films  This procedure was auto-finalized with no dictation required.  XR Outside Films  This procedure was auto-finalized with no dictation required.  CT Outside Films  This procedure was auto-finalized with no dictation required.  CT Outside Films  This procedure was auto-finalized with no dictation required.  CT Outside Films  This procedure was auto-finalized with no dictation required.  US Outside Films  This procedure was auto-finalized with no dictation required.  XR Outside Films  This procedure was auto-finalized with no dictation required.  CT Outside Films  This procedure was auto-finalized with no dictation required.  CT Outside Films  This procedure was auto-finalized with no dictation required.  CT Outside Films  This procedure was auto-finalized with no dictation required.  MRI Outside Films  This procedure was auto-finalized with no dictation required.    Scheduled Medications  amLODIPine, 5 mg, Oral, Daily  aspirin, 81 mg, Oral, Daily  atorvastatin, 40 mg, Oral, Daily  budesonide-formoterol, 2 puff, Inhalation, BID - RT  clopidogrel, 75 mg, Oral, Daily  furosemide, 40 mg, Oral, Every Other Day  insulin lispro, 2-7 Units, Subcutaneous, 4x Daily AC & at Bedtime  ipratropium-albuterol, 3 mL, Nebulization, 4x Daily - RT  magnesium hydroxide, 15 mL, Oral, Daily  polyethylene glycol, 17 g, Oral, BID  sodium chloride, 10 mL, Intravenous, Q12H    Infusions  sodium chloride, 100 mL/hr, Last Rate: 100 mL/hr (09/21/23 1138)    Diet  NPO Diet NPO Type: Strict NPO         I have personally reviewed:  [x]  Laboratory   []  Microbiology   [x]  Radiology   [x]  EKG/Telemetry   [x]  Cardiology/Vascular   []  Pathology   [x]   Records    Assessment/Plan     Active Hospital Problems    Diagnosis  POA    **History of AAA (abdominal aortic aneurysm) repair [Z98.890]  Not Applicable    Back pain [M54.9]  Yes    Hypertension, essential [I10]  Yes    Stage 3b chronic kidney disease [N18.32]  Yes    Coronary artery disease involving native coronary artery of native heart without angina pectoris [I25.10]  Yes    Chronic bronchitis [J42]  Yes      Resolved Hospital Problems   No resolved problems to display.       73 y.o. male admitted with History of AAA (abdominal aortic aneurysm) repair.    Abdominal aortic aneurysm with history of repair  Intermittent abdominal and back pain  -Patient transferred here from outside facility over concern that stent migrated  -Vascular surgery has evaluated and is awaiting CT upload from outside hospital  -Keep n.p.o. unless otherwise directed per vascular surgery    Constipation  -Bowel regimen initiated    COPD  -Continue home inhalers and DuoNebs    Diabetes, type 2  -Hold home oral medications, low-dose sliding scale initiated  -Check A1c    Stage IIIb CKD  -Creatinine 1.2, appears to be baseline  -Check daily BMP    Hypertension  -Continue amlodipine and Lasix which are home medications    CAD with history of PCI  -Currently on statin and DAPT    SCDs for DVT prophylaxis.  Full code.  Discussed with patient and nursing staff.  Anticipate discharge home timing yet to be determined.      Barbara Ceballos MD  [facility]  09/21/23  12:06 EDT    I wore protective equipment throughout this patient encounter including gloves.  Hand hygiene was performed before donning protective equipment and after removal when leaving the room.         Copied text in this note has been reviewed and is accurate as of 09/21/23.

## 2023-09-21 NOTE — H&P
HISTORY AND PHYSICAL   [facility]        Patient Identification:  Name: Narciso Constantino  Age: 73 y.o.  Sex: male  :  1950  MRN: [id number]                     Primary Care Physician: Ariella Rocha DO    Chief Complaint: Presently with no complaints.    History of Present Illness:   Very pleasant 73-year-old gentleman who was transferred to his facility reportedly because a stent from her previous abdominal aortic aneurysm repair had moved.  At present the patient feels well and has no complaints.  On asking him what transpired he notes that this morning he had severe left lower quadrant pain.  The pain only lasted 15 minutes and went away on its own.  Last bowel movement was yesterday and it was normal.  Presently with no abdominal complaints.  He states he also has back pain.  Presently this is not bothering him either.  When asked about the back pain is very point specific over L2.  Apparently he had an appoint with his PCP who sent him to the local emergency room where a CT scan was done and there is concern that the AAA stent had moved.      Past Medical History:  Past Medical History:   Diagnosis Date    AAA (abdominal aortic aneurysm)     REPAIRED    Aortic valve regurgitation 2020    mild-to-moderate aortic regurgitation on echo 2020     Chest pain     Chronic bronchitis     COPD (chronic obstructive pulmonary disease)     Coronary artery disease     Diabetes mellitus     Dyspnea     Hypertension      Past Surgical History:  Past Surgical History:   Procedure Laterality Date    ABDOMINAL AORTIC ANEURYSM REPAIR, AORTIC BIFEMORAL ILIAC RENAL BYPASS N/A 2017    Procedure: BILATERAL FEMORAL CUT DOWN, AORTIC ILIOFEMORAL ARTERIOGRAM, BILATERAL ARTERY ANGIOPLASTY, GORE STENT GRAFT REPAIR OF RUPTURED AORTIC ANEURYSM;  Surgeon: Guevara Parisi MD;  Location: [facility] ;  Service:     CARDIAC CATHETERIZATION Left 2017    Procedure: Cardiac  Catheterization/Vascular Study- radial approach only;  Surgeon: Ervin Kimbrough MD;  Location: [facility] CATH INVASIVE LOCATION;  Service:     CARDIAC CATHETERIZATION N/A 7/27/2017    Procedure: Left Heart Cath;  Surgeon: Ervin Kimbrough MD;  Location: [facility] CATH INVASIVE LOCATION;  Service:     CARDIAC CATHETERIZATION N/A 7/27/2017    Procedure: Left ventriculography;  Surgeon: Ervin Kimbrough MD;  Location[facility] CATH INVASIVE LOC[facility];  Service:     CARDIAC CATHETERIZATION N/A 7/27/2017    Procedure: Coronary angiography;  Surgeon: Ervin Kimbrough MD;  Location[facility] C[facility]ASIVE LOC[facility];  Service:     CARDIAC CATHETERIZATION N/A 7/27/2017    Procedure: Angioplasty-coronary;  Surgeon: Ervin Kimbrough MD;  Location[facility] C[facility]VASIVE LOC[facility];  Service:     CARDIAC CATHETERIZATION N/A 8/9/2017    Procedure: Stent LUISANA coronary;  Surgeon: Ervin Kimbrough MD;  Location: [facility] CATH INVASIVE LOCATION;  Service:     NY RT/LT HEART CATHETERS N/A 8/9/2017    Procedure: Percutaneous Coronary Intervention LAD;  Surgeon: Ervin Kmibrough MD;  Location: [facility] CATH INVASIVE LOCATION;  Service: Cardiovascular      Home Meds:  Medications Prior to Admission   Medication Sig Dispense Refill Last Dose    amLODIPine (NORVASC) 10 MG tablet Take 0.5 tablets by mouth Daily. Take 1/2 tablet by mouth every day   9/20/2023    aspirin 81 MG chewable tablet Chew 1 tablet Daily.  11 9/20/2023    atorvastatin (LIPITOR) 40 MG tablet Take 1 tablet by mouth Daily. 90 tablet 3 9/20/2023    clopidogrel (PLAVIX) 75 MG tablet Take 1 tablet by mouth Daily.   9/20/2023    furosemide (LASIX) 40 MG tablet Take 1 tablet by mouth Every Other Day. 45 tablet 3 9/20/2023    hydroCHLOROthiazide (MICROZIDE) 12.5 MG capsule Take 2 capsules by mouth Daily. Take 2 capsules by mouth every morning   9/20/2023    metFORMIN (GLUCOPHAGE) 1000 MG tablet Take 1 tablet by mouth 2 (Two) Times a Day With Meals.   9/20/2023    albuterol (PROVENTIL HFA) 108 (90 BASE)  MCG/ACT inhaler Inhale 2 puffs Every 4 (Four) Hours As Needed for Wheezing.       budesonide-formoterol (SYMBICORT) 160-4.5 MCG/ACT inhaler Inhale 2 puffs 2 (Two) Times a Day.          Allergies:  No Known Allergies  Immunizations:    There is no immunization history on file for this patient.  Social History:   Social History     Social History Narrative    Not on file     Social History     Tobacco Use    Smoking status: Former     Packs/day: 2.00     Years: 50.00     Pack years: 100.00     Types: Cigarettes     Quit date: 2017     Years since quittin.1    Smokeless tobacco: Never   Substance Use Topics    Alcohol use: No     Family History:  Family History   Problem Relation Age of Onset    No Known Problems Mother     No Known Problems Father         Review of Systems  Review of Systems   Constitutional: Negative.    HENT: Negative.     Eyes: Negative.    Respiratory: Negative.     Cardiovascular: Negative.    Gastrointestinal:         As per history of present illness.   Endocrine: Negative.    Genitourinary: Negative.    Musculoskeletal:         As per history of present illness.   Skin: Negative.    Allergic/Immunologic: Negative.    Neurological: Negative.    Hematological: Negative.    Psychiatric/Behavioral: Negative.       Objective:  T Max 24 hrs: Temp (24hrs), Av.5 °F (36.4 °C), Min:97.5 °F (36.4 °C), Max:97.5 °F (36.4 °C)    Vitals Ranges:   Temp:  [97.5 °F (36.4 °C)] 97.5 °F (36.4 °C)  Heart Rate:  [81] 81  Resp:  [18] 18  BP: (143)/(66) 143/66      Exam:  Physical Exam  Constitutional:       General: He is not in acute distress.     Appearance: Normal appearance. He is normal weight. He is not ill-appearing, toxic-appearing or diaphoretic.   HENT:      Head: Normocephalic and atraumatic.      Right Ear: External ear normal.      Left Ear: External ear normal.      Nose: Nose normal.      Mouth/Throat:      Mouth: Mucous membranes are moist.   Eyes:      General: No scleral icterus.         Right eye: No discharge.         Left eye: No discharge.      Extraocular Movements: Extraocular movements intact.      Conjunctiva/sclera: Conjunctivae normal.   Cardiovascular:      Rate and Rhythm: Normal rate and regular rhythm.      Heart sounds: Normal heart sounds.      Comments: Pedal pulses 1+ bilaterally.  Pulmonary:      Effort: Pulmonary effort is normal.      Breath sounds: Normal breath sounds.   Abdominal:      General: Abdomen is flat. Bowel sounds are normal. There is no distension.      Palpations: Abdomen is soft. There is no mass.      Tenderness: There is no abdominal tenderness. There is no guarding or rebound.   Musculoskeletal:      Cervical back: Neck supple.      Right lower leg: No edema.      Left lower leg: No edema.   Skin:     General: Skin is warm and dry.   Neurological:      General: No focal deficit present.      Mental Status: He is alert and oriented to person, place, and time.   Psychiatric:         Mood and Affect: Mood normal.         Behavior: Behavior normal.         Thought Content: Thought content normal.         Judgment: Judgment normal.       Data Review:  All labs and radiology reviewed.    Assessment:  History of AAA (abdominal aortic aneurysm) repair; there is concern as to whether his stent has moved.  His vascular surgery will see him in the morning.  Coronary artery disease involving native coronary artery of native heart without angina pectoris: Stable.  COPD: Stable.  Stage 3b chronic kidney disease: Monitor closely status post CT dye.  Hypertension, essential: Continue home meds.  Monitor.  Back pain: Musculoskeletal.  No evidence of a vascular component.  Presently resolved.  Left lower quadrant pain: Very transient.  Resolved.  Diabetes type 2: Hold metformin.  Monitor closely with sliding scale insulin.        Plan:  Please see above.  Discussed with patient.  Discussed with RN.    Samuel Galaviz MD  9/20/2023  23:41 EDT    EMR Dragon/Transcription  disclaimer:   Much of this encounter note is an electronic transcription/translation of spoken language to printed text. The electronic translation of spoken language may permit erroneous, or at times, nonsensical words or phrases to be inadvertently transcribed; Although I have reviewed the note for such errors, some may still exist.

## 2023-09-21 NOTE — CONSULTS
Nutrition Services    Patient Name:  Narciso Constantino  YOB: 1950  MRN: [id number]  Admit Date:  9/20/2023    Assessment Date:  09/21/23    Summary:  Nutrition consult from nursing admission screen. Pt here to evaluate a stent.  + weight loss of around 20lbs if admit wt is accurate. States his appetite is good and ate 100% last night. Currently NPO. No physical signs of muscle wasting or fat loss. Offered nutrition supplements, pt declined. Food preferences obtained. Encouraged good nutrition. Will follow as needed.      CLINICAL NUTRITION ASSESSMENT      Reason for Assessment MST score 2+, Nurse Admission Screen     Diagnosis/Problem   Hx AAA repair, CAD, COPD, CKD, HTN, DM   Medical/Surgical History Past Medical History:   Diagnosis Date    AAA (abdominal aortic aneurysm) 2017    REPAIRED    Aortic valve regurgitation 2/20/2020    mild-to-moderate aortic regurgitation on echo 2/21/2020     Chest pain     Chronic bronchitis     COPD (chronic obstructive pulmonary disease)     Coronary artery disease     Diabetes mellitus     Dyspnea     Hypertension        Past Surgical History:   Procedure Laterality Date    ABDOMINAL AORTIC ANEURYSM REPAIR, AORTIC BIFEMORAL ILIAC RENAL BYPASS N/A 7/18/2017    Procedure: BILATERAL FEMORAL CUT DOWN, AORTIC ILIOFEMORAL ARTERIOGRAM, BILATERAL ARTERY ANGIOPLASTY, GORE STENT GRAFT REPAIR OF RUPTURED AORTIC ANEURYSM;  Surgeon: Guevara Parisi MD;  Location: [facility] OR 18/19;  Service:     CARDIAC CATHETERIZATION Left 7/27/2017    Procedure: Cardiac Catheterization/Vascular Study- radial approach only;  Surgeon: Ervin Kimbrough MD;  Location: [facility] CATH INVASIVE LOCATION;  Service:     CARDIAC CATHETERIZATION N/A 7/27/2017    Procedure: Left Heart Cath;  Surgeon: Ervin Kimbrough MD;  Location: [facility] CATH INVASIVE LOCATION;  Service:     CARDIAC CATHETERIZATION N/A 7/27/2017    Procedure: Left ventriculography;  Surgeon: Ervin Kimbrough MD;  Location: [facility]  INVASIVE LOCATION;  Service:     CARDIAC CATHETERIZATION N/A 7/27/2017    Procedure: Coronary angiography;  Surgeon: Ervin Kimbrough MD;  Location:  [facility] CATH INVASIVE LOCATION;  Service:     CARDIAC CATHETERIZATION N/A 7/27/2017    Procedure: Angioplasty-coronary;  Surgeon: Ervin Kimbrough MD;  Location:  [facility] CATH INVASIVE LOCATION;  Service:     CARDIAC CATHETERIZATION N/A 8/9/2017    Procedure: Stent LUISANA coronary;  Surgeon: Ervin Kimbrough MD;  Location:  [facility] CATH INVASIVE LOCATION;  Service:     IA RT/LT HEART CATHETERS N/A 8/9/2017    Procedure: Percutaneous Coronary Intervention LAD;  Surgeon: Ervin Kimbrough MD;  Location:  [facility] CATH INVASIVE LOCATION;  Service: Cardiovascular        Encounter Information        Nutrition History:  Good appetite   Factors Affecting Intake: Other: recent decline in intake due to being in the hospital recently     Anthropometrics        Current Height  Current Weight  BMI kg/m2  63in  Weight: 59.2 kg (130 lb 8 oz) (09/21/23 0558)  Body mass index is 23.12 kg/m².   Adjusted BMI (if applicable)    BMI Category Normal/Healthy (18.4 - 24.9)       Admission Weight 59.2kg       Ideal Body Weight (IBW) 56.9kg       Usual Body Weight (UBW) 150lb recently   Weight Trend Loss       Weight History Wt Readings from Last 30 Encounters:   09/21/23 0558 59.2 kg (130 lb 8 oz)   09/20/23 2241 59.2 kg (130 lb 8 oz)   10/27/21 1229 78.9 kg (174 lb)   09/03/21 1015 79.8 kg (176 lb)   07/14/21 0105 80.2 kg (176 lb 12.9 oz)   07/13/21 1831 61.7 kg (136 lb 0.4 oz)   05/20/20 0000 78 kg (172 lb)   09/01/17 1057 53.1 kg (117 lb)   08/16/17 1322 50.8 kg (112 lb)   08/09/17 0734 75 kg (165 lb 5 oz)   07/19/17 0842 75 kg (165 lb 5.5 oz)   07/18/17 1128 75 kg (165 lb 5.5 oz)      --  Tests/Procedures        Tests/Procedures CT scan, Ultrasound, X-Ray     Labs       Pertinent Labs    Results from last 7 days   Lab Units 09/21/23  0603   SODIUM mmol/L 138   POTASSIUM mmol/L 4.2   CHLORIDE mmol/L 110*    CO2 mmol/L 19.4*   BUN mg/dL 16   CREATININE mg/dL 1.19   CALCIUM mg/dL 8.6   GLUCOSE mg/dL 97     Results from last 7 days   Lab Units 09/21/23  0603   HEMOGLOBIN g/dL 8.2*   HEMATOCRIT % 25.4*   WBC 10*3/mm3 7.67     Results from last 7 days   Lab Units 09/21/23  0603   PLATELETS 10*3/mm3 207     No results found for: COVID19  No results found for: HGBA1C       Medications           Scheduled Medications amLODIPine, 5 mg, Oral, Daily  aspirin, 81 mg, Oral, Daily  atorvastatin, 40 mg, Oral, Daily  budesonide-formoterol, 2 puff, Inhalation, BID - RT  clopidogrel, 75 mg, Oral, Daily  furosemide, 40 mg, Oral, Every Other Day  insulin lispro, 2-7 Units, Subcutaneous, 4x Daily AC & at Bedtime  ipratropium-albuterol, 3 mL, Nebulization, 4x Daily - RT  magnesium hydroxide, 15 mL, Oral, Daily  polyethylene glycol, 17 g, Oral, BID  sodium chloride, 10 mL, Intravenous, Q12H       Infusions sodium chloride, 100 mL/hr, Last Rate: 100 mL/hr (09/21/23 0230)       PRN Medications   acetaminophen **OR** acetaminophen **OR** acetaminophen    dextrose    dextrose    glucagon (human recombinant)    nitroglycerin    ondansetron **OR** ondansetron    sodium chloride    sodium chloride     Physical Findings          General Appearance alert, oriented   Oral/Mouth Cavity dental appliance   Edema  no edema   Gastrointestinal normoactive   Skin  skin intact   Tubes/Drains/Lines none   NFPE No clinical signs of muscle wasting or fat loss   --  Current Nutrition Orders & Evaluation of Intake       Oral Nutrition     Food Allergies NKFA   Current PO Diet NPO Diet NPO Type: Strict NPO   Supplement n/a   PO Evaluation     % PO Intake/# of Days 100% dinner   --  PES STATEMENT / NUTRITION DIAGNOSIS      Nutrition Dx Problem  Problem: Nutrition Appropriate for Condition at this Time  Etiology: Medical Diagnosis - recent AAA repair    Signs/Symptoms: Unintended Weight Change     --  NUTRITION INTERVENTION / PLAN OF CARE      Intervention Goal(s)  Maintain nutrition status, Reduce/improve symptoms, Meet estimated needs, Disease management/therapy, Tolerate PO , Maintain intake, and Maintain weight         RD Intervention/Action Interview for preferences, Supplement offered/declined, Encourage intake, Continue to monitor, and Care plan reviewed         Prescription/Orders:       PO Diet       Supplements       Snacks       Enteral Nutrition       Parenteral Nutrition    New Prescription Ordered? No changes at this time   --      Monitor/Evaluation Per protocol   Discharge Plan/Needs Pending clinical course   --    RD to follow per protocol.    Electronically signed by:  Elyssa Wood RD  09/21/23 11:27 EDT

## 2023-09-22 VITALS
HEART RATE: 84 BPM | TEMPERATURE: 99.5 F | WEIGHT: 130.5 LBS | DIASTOLIC BLOOD PRESSURE: 54 MMHG | SYSTOLIC BLOOD PRESSURE: 143 MMHG | RESPIRATION RATE: 20 BRPM | HEIGHT: 63 IN | OXYGEN SATURATION: 98 % | BODY MASS INDEX: 23.12 KG/M2

## 2023-09-22 LAB
ANION GAP SERPL CALCULATED.3IONS-SCNC: 9.5 MMOL/L (ref 5–15)
BUN SERPL-MCNC: 12 MG/DL (ref 8–23)
BUN/CREAT SERPL: 12.1 (ref 7–25)
CALCIUM SPEC-SCNC: 8.1 MG/DL (ref 8.6–10.5)
CHLORIDE SERPL-SCNC: 108 MMOL/L (ref 98–107)
CO2 SERPL-SCNC: 22.5 MMOL/L (ref 22–29)
CREAT SERPL-MCNC: 0.99 MG/DL (ref 0.76–1.27)
DEPRECATED RDW RBC AUTO: 41.8 FL (ref 37–54)
EGFRCR SERPLBLD CKD-EPI 2021: 80.4 ML/MIN/1.73
ERYTHROCYTE [DISTWIDTH] IN BLOOD BY AUTOMATED COUNT: 13.6 % (ref 12.3–15.4)
GLUCOSE BLDC GLUCOMTR-MCNC: 104 MG/DL (ref 70–130)
GLUCOSE BLDC GLUCOMTR-MCNC: 130 MG/DL (ref 70–130)
GLUCOSE BLDC GLUCOMTR-MCNC: 88 MG/DL (ref 70–130)
GLUCOSE BLDC GLUCOMTR-MCNC: 96 MG/DL (ref 70–130)
GLUCOSE SERPL-MCNC: 84 MG/DL (ref 65–99)
HBA1C MFR BLD: 6.5 % (ref 4.8–5.6)
HCT VFR BLD AUTO: 26 % (ref 37.5–51)
HGB BLD-MCNC: 8.3 G/DL (ref 13–17.7)
MAGNESIUM SERPL-MCNC: 2 MG/DL (ref 1.6–2.4)
MCH RBC QN AUTO: 27.5 PG (ref 26.6–33)
MCHC RBC AUTO-ENTMCNC: 31.9 G/DL (ref 31.5–35.7)
MCV RBC AUTO: 86.1 FL (ref 79–97)
PHOSPHATE SERPL-MCNC: 2.5 MG/DL (ref 2.5–4.5)
PLATELET # BLD AUTO: 204 10*3/MM3 (ref 140–450)
PMV BLD AUTO: 9.9 FL (ref 6–12)
POTASSIUM SERPL-SCNC: 4.2 MMOL/L (ref 3.5–5.2)
RBC # BLD AUTO: 3.02 10*6/MM3 (ref 4.14–5.8)
SODIUM SERPL-SCNC: 140 MMOL/L (ref 136–145)
WBC NRBC COR # BLD: 8.05 10*3/MM3 (ref 3.4–10.8)

## 2023-09-22 PROCEDURE — 83036 HEMOGLOBIN GLYCOSYLATED A1C: CPT | Performed by: STUDENT IN AN ORGANIZED HEALTH CARE EDUCATION/TRAINING PROGRAM

## 2023-09-22 PROCEDURE — 85027 COMPLETE CBC AUTOMATED: CPT | Performed by: STUDENT IN AN ORGANIZED HEALTH CARE EDUCATION/TRAINING PROGRAM

## 2023-09-22 PROCEDURE — 80048 BASIC METABOLIC PNL TOTAL CA: CPT | Performed by: STUDENT IN AN ORGANIZED HEALTH CARE EDUCATION/TRAINING PROGRAM

## 2023-09-22 PROCEDURE — 82948 REAGENT STRIP/BLOOD GLUCOSE: CPT

## 2023-09-22 PROCEDURE — 94799 UNLISTED PULMONARY SVC/PX: CPT

## 2023-09-22 PROCEDURE — 94761 N-INVAS EAR/PLS OXIMETRY MLT: CPT

## 2023-09-22 PROCEDURE — 96361 HYDRATE IV INFUSION ADD-ON: CPT

## 2023-09-22 PROCEDURE — G0378 HOSPITAL OBSERVATION PER HR: HCPCS

## 2023-09-22 PROCEDURE — 83735 ASSAY OF MAGNESIUM: CPT | Performed by: STUDENT IN AN ORGANIZED HEALTH CARE EDUCATION/TRAINING PROGRAM

## 2023-09-22 PROCEDURE — 84100 ASSAY OF PHOSPHORUS: CPT | Performed by: STUDENT IN AN ORGANIZED HEALTH CARE EDUCATION/TRAINING PROGRAM

## 2023-09-22 RX ORDER — HYDROCODONE BITARTRATE AND ACETAMINOPHEN 5; 325 MG/1; MG/1
1 TABLET ORAL EVERY 6 HOURS PRN
Status: DISCONTINUED | OUTPATIENT
Start: 2023-09-22 | End: 2023-09-23 | Stop reason: HOSPADM

## 2023-09-22 RX ORDER — OXYCODONE HYDROCHLORIDE AND ACETAMINOPHEN 5; 325 MG/1; MG/1
1 TABLET ORAL EVERY 6 HOURS PRN
Qty: 12 TABLET | Refills: 0 | Status: SHIPPED | OUTPATIENT
Start: 2023-09-22

## 2023-09-22 RX ADMIN — AMLODIPINE BESYLATE 5 MG: 5 TABLET ORAL at 08:56

## 2023-09-22 RX ADMIN — ASPIRIN 81 MG: 81 TABLET, CHEWABLE ORAL at 08:56

## 2023-09-22 RX ADMIN — Medication 10 ML: at 08:56

## 2023-09-22 RX ADMIN — Medication 10 ML: at 21:58

## 2023-09-22 RX ADMIN — CLOPIDOGREL BISULFATE 75 MG: 75 TABLET, FILM COATED ORAL at 08:56

## 2023-09-22 RX ADMIN — ATORVASTATIN CALCIUM 40 MG: 20 TABLET, FILM COATED ORAL at 08:56

## 2023-09-22 RX ADMIN — HYDROCODONE BITARTRATE AND ACETAMINOPHEN 1 TABLET: 5; 325 TABLET ORAL at 09:01

## 2023-09-22 NOTE — PLAN OF CARE
Problem: Adult Inpatient Plan of Care  Goal: Plan of Care Review  Flowsheets (Taken 9/22/2023 1843)  Progress: improving  Plan of Care Reviewed With: patient  Outcome Evaluation:   vss, pain controlled, plan for discharge, fu with oupt surgeon in [facility]  Goal: Absence of Hospital-Acquired Illness or Injury  Intervention: Identify and Manage Fall Risk  Recent Flowsheet Documentation  Taken 9/22/2023 1643 by Snow Mccauley RN  Safety Promotion/Fall Prevention:   safety round/check completed   room organization consistent   nonskid shoes/slippers when out of bed   lighting adjusted   fall prevention program maintained   clutter free environment maintained   assistive device/personal items within reach  Taken 9/22/2023 1445 by Snow Mccauley RN  Safety Promotion/Fall Prevention:   safety round/check completed   room organization consistent   nonskid shoes/slippers when out of bed   lighting adjusted   fall prevention program maintained   clutter free environment maintained   assistive device/personal items within reach  Taken 9/22/2023 1200 by Snow Mccauley RN  Safety Promotion/Fall Prevention:   safety round/check completed   room organization consistent   nonskid shoes/slippers when out of bed   lighting adjusted   fall prevention program maintained   clutter free environment maintained   assistive device/personal items within reach  Taken 9/22/2023 1000 by Snow Mccauley RN  Safety Promotion/Fall Prevention:   safety round/check completed   room organization consistent   nonskid shoes/slippers when out of bed   lighting adjusted   fall prevention program maintained   clutter free environment maintained   assistive device/personal items within reach  Taken 9/22/2023 0901 by Snow Mccauley RN  Safety Promotion/Fall Prevention:   safety round/check completed   room organization consistent   nonskid shoes/slippers when out of bed   lighting adjusted   fall prevention program  maintained   clutter free environment maintained   assistive device/personal items within reach  Intervention: Prevent and Manage VTE (Venous Thromboembolism) Risk  Recent Flowsheet Documentation  Taken 9/22/2023 1200 by Snow Mccauley RN  Activity Management: sitting, edge of bed  Taken 9/22/2023 0901 by Snow Mccauley RN  Activity Management: sitting, edge of bed  Goal: Optimal Comfort and Wellbeing  Intervention: Provide Person-Centered Care  Recent Flowsheet Documentation  Taken 9/22/2023 0901 by Snow Mccauley RN  Trust Relationship/Rapport:   care explained   questions answered   reassurance provided   thoughts/feelings acknowledged   Goal Outcome Evaluation:  Plan of Care Reviewed With: patient        Progress: improving  Outcome Evaluation: vss, pain controlled, plan for discharge, fu with oupt surgeon in [place] [facility] [place]

## 2023-09-22 NOTE — DISCHARGE SUMMARY
Patient Name: Narciso Constantino  : 1950  MRN: [id number]    Date of Admission: 2023  Date of Discharge:  2023  Primary Care Physician: Ariella Rocha DO      Chief Complaint:   No chief complaint on file.      Discharge Diagnoses     Active Hospital Problems    Diagnosis  POA    **History of AAA (abdominal aortic aneurysm) repair [Z98.890]  Not Applicable    Back pain [M54.9]  Yes    Hypertension, essential [I10]  Yes    Stage 3b chronic kidney disease [N18.32]  Yes    Coronary artery disease involving native coronary artery of native heart without angina pectoris [I25.10]  Yes    Chronic bronchitis [J42]  Yes      Resolved Hospital Problems   No resolved problems to display.        Hospital Course     Mr. Constantino is a 73 y.o. male with a history of AAA status post EVAR, hypertension, type 2 diabetes, COPD, CAD with history of PCI, CKD 3B who presented to [facility] initially complaining of abdominal pain.  Please see the admitting history and physical for further details.  He was found to have aortic main body graft migration and was admitted to the hospital for further evaluation and treatment.  Vascular surgery consulted on admission for management recommendations, after reviewing imaging, vascular surgery does not believe the patient's abdominal pain was related to his aneurysm but vascular surgery is recommending that he follow-up as an outpatient with the [facility] for management thoracicoabdominal aortic graft given his complexity.  Patient's abdominal pain has resolved with multiple bowel movements.  He also reports significant improvement in his back pain with low-dose Norco.  The patient is stable for discharge, he has been instructed to follow-up with [facility] with vascular surgery as above and with his PCP for a posthospital follow-up visit.  Case discussed with the patient and vascular surgeon on the day of  discharge    Day of Discharge     Subjective:  Patient feels improved today, had multiple bowel movements and abdominal pain has resolved.  Reports Norco for back pain also worked well.    Physical Exam:  Temp:  [97.8 °F (36.6 °C)-98.2 °F (36.8 °C)] 98 °F (36.7 °C)  Heart Rate:  [69-76] 71  Resp:  [18-22] 18  BP: (128-161)/(55-65) 128/55  Body mass index is 23.12 kg/m².  Physical Exam  Constitutional:       General: He is not in acute distress.     Appearance: He is ill-appearing (chronically). He is not toxic-appearing.   Cardiovascular:      Rate and Rhythm: Normal rate and regular rhythm.   Pulmonary:      Effort: Pulmonary effort is normal. No respiratory distress.      Breath sounds: Normal breath sounds. No wheezing.   Abdominal:      General: Abdomen is flat.      Palpations: Abdomen is soft.      Tenderness: There is no abdominal tenderness.   Musculoskeletal:         General: No tenderness.      Right lower leg: No edema.      Left lower leg: No edema.   Skin:     General: Skin is warm and dry.   Neurological:      General: No focal deficit present.      Mental Status: He is alert and oriented to person, place, and time. Mental status is at baseline.      Motor: No weakness.       Consultants     Consult Orders (all) (From admission, onward)       Start     Ordered    09/21/23 0127  Inpatient Nutrition Consult  Once        Provider:  (Not yet assigned)    09/21/23 0126    09/20/23 2304  Inpatient Vascular Surgery Consult  Once        Specialty:  Vascular Surgery  Provider:  Christian Silveira II, MD    09/20/23 2303                  Procedures     Imaging Results (All)       Procedure Component Value Units Date/Time    US Outside Films [[id number]] Resulted: 09/21/23 1109     Updated: 09/21/23 1118    Narrative:      This procedure was auto-finalized with no dictation required.    XR Outside Films [[id number]] Resulted: 09/21/23 1109     Updated: 09/21/23 1117    Narrative:      This procedure was auto-finalized  with no dictation required.    CT Outside Films [[id number]] Resulted: 09/21/23 1109     Updated: 09/21/23 1117    Narrative:      This procedure was auto-finalized with no dictation required.    CT Outside Films [[id number]] Resulted: 09/21/23 1109     Updated: 09/21/23 1116    Narrative:      This procedure was auto-finalized with no dictation required.    CT Outside Films [[id number]] Resulted: 09/21/23 1109     Updated: 09/21/23 1116    Narrative:      This procedure was auto-finalized with no dictation required.    US Outside Films [[id number]] Resulted: 09/21/23 1052     Updated: 09/21/23 1058    Narrative:      This procedure was auto-finalized with no dictation required.    XR Outside Films [[id number]] Resulted: 09/21/23 1052     Updated: 09/21/23 1057    Narrative:      This procedure was auto-finalized with no dictation required.    CT Outside Films [[id number]] Resulted: 09/21/23 1052     Updated: 09/21/23 1056    Narrative:      This procedure was auto-finalized with no dictation required.    CT Outside Films [[id number]] Resulted: 09/21/23 1027     Updated: 09/21/23 1029    Narrative:      This procedure was auto-finalized with no dictation required.    CT Outside Films [[id number]] Resulted: 09/21/23 1017     Updated: 09/21/23 1021    Narrative:      This procedure was auto-finalized with no dictation required.    MRI Outside Films [[id number]] Resulted: 09/21/23 1017     Updated: 09/21/23 1020    Narrative:      This procedure was auto-finalized with no dictation required.            Pertinent Labs     Results from last 7 days   Lab Units 09/22/23  0459 09/21/23  0603   WBC 10*3/mm3 8.05 7.67   HEMOGLOBIN g/dL 8.3* 8.2*   PLATELETS 10*3/mm3 204 207     Results from last 7 days   Lab Units 09/22/23  0459 09/21/23  0603   SODIUM mmol/L 140 138   POTASSIUM mmol/L 4.2 4.2   CHLORIDE mmol/L 108* 110*   CO2 mmol/L 22.5 19.4*   BUN mg/dL 12 16   CREATININE mg/dL 0.99 1.19   GLUCOSE mg/dL 84 97   Estimated Creatinine  Clearance: 55.6 mL/min (by C-G formula based on SCr of 0.99 mg/dL).    Results from last 7 days   Lab Units 09/22/23  0459 09/21/23  0603   CALCIUM mg/dL 8.1* 8.6   MAGNESIUM mg/dL 2.0  --    PHOSPHORUS mg/dL 2.5  --                Invalid input(s): LDLCALC        Test Results Pending at Discharge     Pending Labs       Order Current Status    Hemoglobin A1c In process            Discharge Details        Discharge Medications        New Medications        Instructions Start Date   oxyCODONE-acetaminophen 5-325 MG per tablet  Commonly known as: Percocet   1 tablet, Oral, Every 6 Hours PRN             Continue These Medications        Instructions Start Date   amLODIPine 10 MG tablet  Commonly known as: NORVASC   5 mg, Oral, Daily, Take 1/2 tablet by mouth every day       aspirin 81 MG chewable tablet   81 mg, Oral, Daily      atorvastatin 40 MG tablet  Commonly known as: LIPITOR   40 mg, Oral, Daily      clopidogrel 75 MG tablet  Commonly known as: PLAVIX   75 mg, Oral, Daily      furosemide 40 MG tablet  Commonly known as: LASIX   40 mg, Oral, Every Other Day      metFORMIN 1000 MG tablet  Commonly known as: GLUCOPHAGE   1,000 mg, Oral, 2 Times Daily With Meals      Proventil  (90 Base) MCG/ACT inhaler  Generic drug: albuterol sulfate HFA   2 puffs, Inhalation, Every 4 Hours PRN      Symbicort 160-4.5 MCG/ACT inhaler  Generic drug: budesonide-formoterol   2 puffs, Inhalation, 2 Times Daily - RT             Stop These Medications      hydroCHLOROthiazide 12.5 MG capsule  Commonly known as: MICROZIDE              No Known Allergies      Discharge Disposition:  Home or Self Care    Discharge Diet:  Diet Order   Procedures    Diet: Diabetic Diets; Consistent Carbohydrate; Texture: Regular Texture (IDDSI 7); Fluid Consistency: Thin (IDDSI 0)       Discharge Activity:   Activity Instructions       Activity as Tolerated              CODE STATUS:    Code Status and Medical Interventions:   Ordered at: 09/20/23 6423      Code Status (Patient has no pulse and is not breathing):    CPR (Attempt to Resuscitate)     Medical Interventions (Patient has pulse or is breathing):    Full Support       No future appointments.  Additional Instructions for the Follow-ups that You Need to Schedule       Discharge Follow-up with PCP   As directed       Currently Documented PCP:    Ariella Rocha DO    PCP Phone Number:    [phone]     Follow Up Details: post-hospital follow up        Discharge Follow-up with Specified Provider: Vascular surgery with [facility] outpatient as per Dr. Silveira   As directed      To: Vascular surgery with [facility] outpatient as per Dr. Silveira               Follow-up Information       Ariella Rocha DO Follow up.    Specialty: Family Medicine  Contact information:  [address] [place] [id number]  [phone]               Ariella Rocha DO .    Specialty: Family Medicine  Why: post-hospital follow up  Contact information:  [address] [place] [address]  [phone]                             Additional Instructions for the Follow-ups that You Need to Schedule       Discharge Follow-up with PCP   As directed       Currently Documented PCP:    Ariella Rocha DO    PCP Phone Number:    [phone]     Follow Up Details: post-hospital follow up        Discharge Follow-up with Specified Provider: Vascular surgery with [facility] outpatient as per Dr. Silveira   As directed      To: Vascular surgery with [facility] outpatient as per Dr. Silveira            Time Spent on Discharge:  I spent greater than 30 minutes on this discharge activity which included: face-to-face encounter with the patient, reviewing the data in the system, coordination of the care with the nursing staff as well as consultants, documentation, and entering orders.       Barbara Ceballos MD  [facility]  09/22/23  15:21 EDT

## 2023-09-22 NOTE — PLAN OF CARE
Goal Outcome Evaluation:   VSS. A&OX4. X1 dose of norco given, pain improved per pt. 02@2LPM added overnight. Pt very [place]. Has productive cough. Plan pending at this time. Call light in reach.

## 2023-09-22 NOTE — PROGRESS NOTES
Name: Narciso Constantino ADMIT: 2023   : 1950  PCP: Ariella Rocha DO    MRN: [id number] LOS: 1 days   AGE/SEX: 73 y.o. male  ROOM: [facility]     Subjective   Subjective   Patient asleep on my arrival but easily awakens to voice.  Had back pain last night which was significantly helped by the norco he received.        Objective   Objective   Vital Signs  Temp:  [97.5 °F (36.4 °C)-98.2 °F (36.8 °C)] 98 °F (36.7 °C)  Heart Rate:  [69-76] 71  Resp:  [18-22] 18  BP: (118-161)/(49-65) 128/55  SpO2:  [79 %-100 %] 97 %  on  Flow (L/min):  [2] 2;   Device (Oxygen Therapy): nasal cannula;room air  Body mass index is 23.12 kg/m².  Physical Exam  Constitutional:       General: He is not in acute distress.     Appearance: He is ill-appearing (chronically). He is not toxic-appearing.   Cardiovascular:      Rate and Rhythm: Normal rate and regular rhythm.   Pulmonary:      Effort: Pulmonary effort is normal. No respiratory distress.      Breath sounds: Normal breath sounds. No wheezing.   Abdominal:      General: Abdomen is flat.      Palpations: Abdomen is soft.      Tenderness: There is no abdominal tenderness.   Musculoskeletal:         General: No tenderness.      Right lower leg: No edema.      Left lower leg: No edema.   Skin:     General: Skin is warm and dry.   Neurological:      General: No focal deficit present.      Mental Status: He is alert and oriented to person, place, and time. Mental status is at baseline.      Motor: No weakness.       Results Review     I reviewed the patient's new clinical results.  Results from last 7 days   Lab Units 23  0459 23  0603   WBC 10*3/mm3 8.05 7.67   HEMOGLOBIN g/dL 8.3* 8.2*   PLATELETS 10*3/mm3 204 207       Results from last 7 days   Lab Units 23  0459 23  0603   SODIUM mmol/L 140 138   POTASSIUM mmol/L 4.2 4.2   CHLORIDE mmol/L 108* 110*   CO2 mmol/L 22.5 19.4*   BUN mg/dL 12 16   CREATININE mg/dL 0.99 1.19   GLUCOSE mg/dL 84 97      Estimated Creatinine Clearance: 55.6 mL/min (by C-G formula based on SCr of 0.99 mg/dL).    Results from last 7 days   Lab Units 09/22/23  0459 09/21/23  0603   CALCIUM mg/dL 8.1* 8.6   MAGNESIUM mg/dL 2.0  --    PHOSPHORUS mg/dL 2.5  --        No results found for: COVID19  Glucose   Date/Time Value Ref Range Status   09/22/2023 1231 130 70 - 130 mg/dL Final   09/22/2023 0632 88 70 - 130 mg/dL Final   09/21/2023 1956 82 70 - 130 mg/dL Final   09/21/2023 1528 165 (H) 70 - 130 mg/dL Final   09/21/2023 1108 109 70 - 130 mg/dL Final   09/21/2023 0622 107 70 - 130 mg/dL Final       US Outside Films  This procedure was auto-finalized with no dictation required.  XR Outside Films  This procedure was auto-finalized with no dictation required.  CT Outside Films  This procedure was auto-finalized with no dictation required.  CT Outside Films  This procedure was auto-finalized with no dictation required.  CT Outside Films  This procedure was auto-finalized with no dictation required.  US Outside Films  This procedure was auto-finalized with no dictation required.  XR Outside Films  This procedure was auto-finalized with no dictation required.  CT Outside Films  This procedure was auto-finalized with no dictation required.  CT Outside Films  This procedure was auto-finalized with no dictation required.  CT Outside Films  This procedure was auto-finalized with no dictation required.  MRI Outside Films  This procedure was auto-finalized with no dictation required.    Scheduled Medications  amLODIPine, 5 mg, Oral, Daily  aspirin, 81 mg, Oral, Daily  atorvastatin, 40 mg, Oral, Daily  budesonide-formoterol, 2 puff, Inhalation, BID - RT  clopidogrel, 75 mg, Oral, Daily  furosemide, 40 mg, Oral, Every Other Day  insulin lispro, 2-7 Units, Subcutaneous, 4x Daily AC & at Bedtime  ipratropium-albuterol, 3 mL, Nebulization, 4x Daily - RT  magnesium hydroxide, 15 mL, Oral, Daily  polyethylene glycol, 17 g, Oral, BID  sodium chloride,  10 mL, Intravenous, Q12H    Infusions  sodium chloride, 100 mL/hr, Last Rate: 100 mL/hr (09/21/23 1138)    Diet  Diet: Diabetic Diets; Consistent Carbohydrate; Texture: Regular Texture (IDDSI 7); Fluid Consistency: Thin (IDDSI 0)         I have personally reviewed:  [x]  Laboratory   []  Microbiology   [x]  Radiology   [x]  EKG/Telemetry   [x]  Cardiology/Vascular   []  Pathology   []  Records    Assessment/Plan     Active Hospital Problems    Diagnosis  POA    **History of AAA (abdominal aortic aneurysm) repair [Z98.890]  Not Applicable    Back pain [M54.9]  Yes    Hypertension, essential [I10]  Yes    Stage 3b chronic kidney disease [N18.32]  Yes    Coronary artery disease involving native coronary artery of native heart without angina pectoris [I25.10]  Yes    Chronic bronchitis [J42]  Yes      Resolved Hospital Problems   No resolved problems to display.       73 y.o. male admitted with History of AAA (abdominal aortic aneurysm) repair.    Abdominal aortic aneurysm with history of repair  Intermittent abdominal and back pain  -Patient transferred here from outside facility over concern that stent migrated  -Vascular surgery has evaluated after reviewing outside CT recommends patient to see aortic specialist; Dr. Silveira to discuss with family/patient further today    Constipation  -Bowel regimen initiated    COPD  -Continue home inhalers and DuoNebs    Diabetes, type 2  -Hold home oral medications, low-dose sliding scale initiated  -Check A1c- added on to am labs    Stage IIIb CKD  -Creatinine 1.2, appears to be baseline  -Check daily BMP    Hypertension  -Continue amlodipine and Lasix which are home medications    CAD with history of PCI  -Currently on statin and DAPT    SCDs for DVT prophylaxis.  Full code.  Discussed with patient and nursing staff. And Dr. Silveira  Anticipate discharge home timing yet to be determined.      Barbara Ceballos MD  [facility]  09/22/23  14:27 EDT    I wore  protective equipment throughout this patient encounter including gloves.  Hand hygiene was performed before donning protective equipment and after removal when leaving the room.         Copied text in this note has been reviewed and is accurate as of 09/22/23.

## 2023-09-22 NOTE — PROGRESS NOTES
Name: Narciso Constantino ADMIT: 2023   : 1950  PCP: Ariella Rocha DO    MRN: [id number] LOS: 1 days   AGE/SEX: 73 y.o. male  ROOM:  [place] [address]     CC: AAA with migrated stent.   Interval History: No acute events overnight. Pt reports abdominal pain gone this morning. 3 Bms yesterday. Tolerating diet. Minimal back pain that is positional.     Subjective   Subjective     Review of Systems  Objective   Objective     Vitals:   Temp:  [97.5 °F (36.4 °C)-98.2 °F (36.8 °C)] 98 °F (36.7 °C)  Heart Rate:  [69-76] 71  Resp:  [18-22] 18  BP: (118-161)/(49-65) 128/55    I/O this shift:  In: -   Out: 300 [Urine:300]    Scheduled Meds:     amLODIPine, 5 mg, Oral, Daily  aspirin, 81 mg, Oral, Daily  atorvastatin, 40 mg, Oral, Daily  budesonide-formoterol, 2 puff, Inhalation, BID - RT  clopidogrel, 75 mg, Oral, Daily  furosemide, 40 mg, Oral, Every Other Day  insulin lispro, 2-7 Units, Subcutaneous, 4x Daily AC & at Bedtime  ipratropium-albuterol, 3 mL, Nebulization, 4x Daily - RT  magnesium hydroxide, 15 mL, Oral, Daily  polyethylene glycol, 17 g, Oral, BID  sodium chloride, 10 mL, Intravenous, Q12H      IV Meds:   sodium chloride, 100 mL/hr, Last Rate: 100 mL/hr (23 1138)        Physical Exam  NAD  NCAT  RRR  Respirations unlabored.   Abdomen soft, non-tender, non-distended. No palpable pulsatile mass.         Data Reviewed:  CBC    Results from last 7 days   Lab Units 23  0459 23  0603   WBC 10*3/mm3 8.05 7.67   HEMOGLOBIN g/dL 8.3* 8.2*   PLATELETS 10*3/mm3 204 207     BMP   Results from last 7 days   Lab Units 23  0459 23  0603   SODIUM mmol/L 140 138   POTASSIUM mmol/L 4.2 4.2   CHLORIDE mmol/L 108* 110*   CO2 mmol/L 22.5 19.4*   BUN mg/dL 12 16   CREATININE mg/dL 0.99 1.19   GLUCOSE mg/dL 84 97   MAGNESIUM mg/dL 2.0  --    PHOSPHORUS mg/dL 2.5  --      Cr Clearance Estimated Creatinine Clearance: 55.6 mL/min (by C-G formula based on SCr of 0.99 mg/dL).  Coag      HbA1C No results found for: HGBA1C  Blood Glucose   Glucose   Date/Time Value Ref Range Status   2023 1231 130 70 - 130 mg/dL Final   2023 0632 88 70 - 130 mg/dL Final   2023 1956 82 70 - 130 mg/dL Final   2023 1528 165 (H) 70 - 130 mg/dL Final   2023 1108 109 70 - 130 mg/dL Final   2023 0622 107 70 - 130 mg/dL Final     Infection     CMP   Results from last 7 days   Lab Units 23  0459 23  0603   SODIUM mmol/L 140 138   POTASSIUM mmol/L 4.2 4.2   CHLORIDE mmol/L 108* 110*   CO2 mmol/L 22.5 19.4*   BUN mg/dL 12 16   CREATININE mg/dL 0.99 1.19   GLUCOSE mg/dL 84 97     ABG      UA      SOSA  No results found for: POCMETH, POCAMPHET, POCBARBITUR, POCBENZO, POCCOCAINE, POCOPIATES, POCOXYCODO, POCPHENCYC, POCPROPOXY, POCTHC, POCTRICYC  Lysis Labs   Results from last 7 days   Lab Units 23  0459 23  0603   HEMOGLOBIN g/dL 8.3* 8.2*   PLATELETS 10*3/mm3 204 207   CREATININE mg/dL 0.99 1.19     Radiology(recent) No radiology results for the last day    Anemia noted.     Active Hospital Problems:   Active Hospital Problems    Diagnosis  POA    **History of AAA (abdominal aortic aneurysm) repair [Z98.890]  Not Applicable    Back pain [M54.9]  Yes    Hypertension, essential [I10]  Yes    Stage 3b chronic kidney disease [N18.32]  Yes    Coronary artery disease involving native coronary artery of native heart without angina pectoris [I25.10]  Yes    Chronic bronchitis [J42]  Yes      Resolved Hospital Problems   No resolved problems to display.      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   Unfortunately patients pathology is best managed at a center that does high volume of complex thoracoabdominal aortic work.    I do not think this aneurysm is symptomatic. His abdominal pain was  located away from the aorta and has now resolved after multiple bowel movements and back pain is improved as well. The aorta is non-tender on palpation.  After discussion with patient and his brother, we have placed a referral for patient to be seen at the [facility]. I have discussed his case with one of the vascular surgeons there who have agreed to see him and manage.   Ok from my standpoint for patient to discharge home for now.   I have called the patient's brother Que at the number he provided me to update him but he did not answer.   Message sent to primary team.   Recommend continuing aspirin and atorvastatin at discharge.     Christian Silveira II, MD  09/22/23  14:44 EDT  Office Number [phone]

## 2023-09-23 ENCOUNTER — READMISSION MANAGEMENT (OUTPATIENT)
Dept: CALL CENTER | Facility: HOSPITAL | Age: 73
End: 2023-09-23
Payer: MEDICARE

## 2023-09-23 NOTE — NURSING NOTE
Per son family friend supposed to be picking up pt. No one here to pick pt up yet, call placed to son and he is unsure if they have left or how long before they will be arriving. He gave a phone number for friend, call placed and unable to reach. Page placed to [facility] to make them aware.     Pt had pain medication at bedside from outpatient pharmacy that were delivered d/t planned discharge. Pt states he did take one at 1920. Advised pt to request pain medications from staff until discharged, pt voices understanding. Pt placed back on tele. Call light in reach.     [id number]-Pt family friends here to  pt. IV removed, tele removed. Pt with no c/o pain/discomfort. No s/s of distress. Pain medication and all belongings sent with pt.

## 2023-09-23 NOTE — CASE MANAGEMENT/SOCIAL WORK
Case Management Discharge Note      Final Note: dc home on 9/22/23         Selected Continued Care - Discharged on 9/22/2023 Admission date: 9/20/2023 - Discharge disposition: Home or Self Care      Destination    No services have been selected for the patient.                Durable Medical Equipment    No services have been selected for the patient.                Dialysis/Infusion    No services have been selected for the patient.                Home Medical Care    No services have been selected for the patient.                Therapy    No services have been selected for the patient.                Community Resources    No services have been selected for the patient.                Community & DME    No services have been selected for the patient.                         Final Discharge Disposition Code: 01 - home or self-care

## 2023-09-23 NOTE — OUTREACH NOTE
Prep Survey      Flowsheet Row Responses   [facility] discharged from? [place]   Is LACE score < 7 ? No   Eligibility Readm Mgmt   Discharge diagnosis History of AAA (abdominal aortic aneurysm) repair   Does the patient have one of the following disease processes/diagnoses(primary or secondary)? Other   Does the patient have Home health ordered? No   Is there a DME ordered? No   Prep survey completed? Yes            Angle VELAZQUEZ - Registered Nurse

## 2023-09-27 ENCOUNTER — READMISSION MANAGEMENT (OUTPATIENT)
Dept: CALL CENTER | Facility: HOSPITAL | Age: 73
End: 2023-09-27
Payer: MEDICARE

## 2023-09-27 NOTE — OUTREACH NOTE
Medical Week 1 Survey      Flowsheet Row Responses   [facility] patient discharged from? [place]   Does the patient have one of the following disease processes/diagnoses(primary or secondary)? Other   Week 1 attempt successful? No   Unsuccessful attempts Attempt 1            Chiquis Musa Registered Nurse

## 2023-10-02 ENCOUNTER — READMISSION MANAGEMENT (OUTPATIENT)
Dept: CALL CENTER | Facility: HOSPITAL | Age: 73
End: 2023-10-02
Payer: MEDICARE

## 2023-10-02 NOTE — OUTREACH NOTE
Medical Week 1 Survey      Flowsheet Row Responses   [facility] patient discharged from? [place]   Does the patient have one of the following disease processes/diagnoses(primary or secondary)? Other   Week 1 attempt successful? No   Unsuccessful attempts Attempt 2            Hellen GORDILLO - Registered Nurse

## 2023-10-05 ENCOUNTER — READMISSION MANAGEMENT (OUTPATIENT)
Dept: CALL CENTER | Facility: HOSPITAL | Age: 73
End: 2023-10-05
Payer: MEDICARE

## 2023-10-05 NOTE — OUTREACH NOTE
Medical Week 1 Survey      Flowsheet Row Responses   [facility] patient discharged from? [place]   Does the patient have one of the following disease processes/diagnoses(primary or secondary)? Other   Week 1 attempt successful? No   Unsuccessful attempts Attempt 3            Susan BALDWIN - Registered Nurse

## 2024-12-22 NOTE — TELEPHONE ENCOUNTER
12/05/17  3:15 PM  Narciso Constantino  1950    Home Phone [phone]   Mobile [phone]     Mr. Constantino left a message on the results line today asking if he could be released to drive. I see from Dr. Gomez' office note on 9/1/17 that he could be released to drive if treadmill stress test was normal and he participated in at least two weeks of rehab. Stress test was performed on 9/6/17. I spoke with the patient; he states he did not know that he was supposed to be participating in rehab. From his 8/16/17 office visit with Steph, it states he lives in [place] and will not be able to participate in rehab due to the drive.    Does he need to be seen?    Liss ESQUIVEL RN    
Debbie avalos  
Info to pt.  (done)  
Patient and his family member was in the room when I discussed the need for rehabilitation and they were going to find a place closer to home.  The person who accompanied him had told me they could manage to take him if it was closer to home.  This was clearly discussed at that last visit.  Find out how active he is and how as he feeling at this time.  If it's an option for him to do rehabilitation closer to home.robbie  
Pt is just wanting the ok to drive to the store and back home.  He said he is walking his property.  
Pt said he is feeling well.  Pt has been walking quite a bit.  He was release by everyone else.  Pt live in [place] and he said there is nothing close to him.        
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 robbie  
Assistance OOB with selected safe patient handling equipment if applicable/Communicate risk of Fall with Harm to all staff, patient, and family/Monitor gait and stability/Provide patient with walking aids/Provide visual cue: red socks, yellow wristband, yellow gown, etc/Reinforce activity limits and safety measures with patient and family/Bed in lowest position, wheels locked, appropriate side rails in place/Call bell, personal items and telephone in reach/Instruct patient to call for assistance before getting out of bed/chair/stretcher/Non-slip footwear applied when patient is off stretcher/[place] to call system/Physically safe environment - no spills, clutter or unnecessary equipment/Purposeful Proactive Rounding/Room/bathroom lighting operational, light cord in reach

## 2025-05-12 NOTE — PLAN OF CARE
Goal Outcome Evaluation:  Plan of Care Reviewed With: patient    Patient had no complaint of pain or discomfort during my shift. Patient is being monitored possible discharge tomorrow depending on lab work.     Sofi Aldrich RN          Progress: improving      Uses valtrex as needed

## (undated) DEVICE — BALN STENTGR Q50

## (undated) DEVICE — PK CATH CARD 40

## (undated) DEVICE — GUIDE CATHETER: Brand: MACH1™

## (undated) DEVICE — INFLATION DEVICE: Brand: ENCORE™ 26

## (undated) DEVICE — KT MANIFLD CARDIAC

## (undated) DEVICE — RADIFOCUS TORQUE DEVICE MULTI-TORQUE VISE: Brand: RADIFOCUS TORQUE DEVICE

## (undated) DEVICE — SPNG LAP 18X18IN LF STRL PK/5

## (undated) DEVICE — MAGNETIC DRAPE: Brand: DEVON

## (undated) DEVICE — SUT SILK 2/0 SUTUPAK TIES 24IN SA75H

## (undated) DEVICE — DEV INDEFLATOR

## (undated) DEVICE — SUT PROLN 5/0 RB1 D/A 36IN 8556H

## (undated) DEVICE — GLIDESHEATH BASIC HYDROPHILIC COATED INTRODUCER SHEATH: Brand: GLIDESHEATH

## (undated) DEVICE — ADHS SKIN DERMABOND

## (undated) DEVICE — SUT SILK 4/0 TIES 18IN A183H

## (undated) DEVICE — GW HITORQUE/BAL MID/WT J W/HCOAT .014 3X190CM

## (undated) DEVICE — Device

## (undated) DEVICE — ARMADA 35 PTA CATHETER 8 MM X 60 MM X 80 CM / OVER-THE-WIRE: Brand: ARMADA

## (undated) DEVICE — ANTIBACTERIAL UNDYED BRAIDED (POLYGLACTIN 910), SYNTHETIC ABSORBABLE SUTURE: Brand: COATED VICRYL

## (undated) DEVICE — SUT SILK 4/0 SUTUPAK TIES 24IN SA73H

## (undated) DEVICE — GW EMR FIX EXCHG J STD .035 3MM 260CM

## (undated) DEVICE — UNDYED BRAIDED (POLYGLACTIN 910), SYNTHETIC ABSORBABLE SUTURE: Brand: COATED VICRYL

## (undated) DEVICE — DRP SLUSH WARMR MACH 52X66IN OM-ORS-301

## (undated) DEVICE — SUT SILK 2/0 SH 75CM 30IN BLK C016D

## (undated) DEVICE — STPLR SKIN VISISTAT WD 35CT

## (undated) DEVICE — SUT SILK 2/0 TIES 18IN A185H

## (undated) DEVICE — GLV SURG TRIUMPH CLASSIC PF LTX 8 STRL

## (undated) DEVICE — ELECTRD BLD EXT EDGE 1P COAT 6.5IN STRL

## (undated) DEVICE — INTRO SHEATH DRYSEAL FLEX 18F 6.0TO6.7MM 33CM

## (undated) DEVICE — RADIFOCUS GLIDEWIRE: Brand: GLIDEWIRE

## (undated) DEVICE — SNAR VASC RETRV ENSNARE STD SYS 6F 6TO10MM 120CM

## (undated) DEVICE — CATH SELECTIVE RIM 5FX65CM .038

## (undated) DEVICE — SUT SILK 3/0 TIES 18IN A184H

## (undated) DEVICE — CATH VENT MIV RADL PIG ST TIP 4F 110CM

## (undated) DEVICE — CONN TBG Y 5 IN 1 LF STRL

## (undated) DEVICE — TREK CORONARY DILATATION CATHETER 2.50 MM X 20 MM / RAPID-EXCHANGE: Brand: TREK

## (undated) DEVICE — DEV ANGIO FLOSWITCH HP BX24

## (undated) DEVICE — SUT VIC 0 CT 36IN J958H

## (undated) DEVICE — PK ATS CUST W CARDIOTOMY RESEVOIR

## (undated) DEVICE — GLV SURG BIOGEL LTX PF 7 1/2

## (undated) DEVICE — SUT PROLN 4/0 RB1 D/A 36IN 8557H

## (undated) DEVICE — SUT PROLN 6/0 BV1 D/A 30IN 8709H

## (undated) DEVICE — CATH DIAG IMPULSE FL3.5 5F 100CM

## (undated) DEVICE — TR BAND RADIAL ARTERY COMPRESSION DEVICE: Brand: TR BAND

## (undated) DEVICE — SUT PROLN 3/0 SH D/A 36IN 8522H

## (undated) DEVICE — INTRO SHEATH DRYSEAL FLEX 16F 5.3TO6.1MM 33CM

## (undated) DEVICE — TOTAL TRAY, 16FR 10ML SIL FOLEY, URN: Brand: MEDLINE

## (undated) DEVICE — NDL PERC 1PRT THNWALL W/BASEPLT 18G 7CM

## (undated) DEVICE — SUT SILK 3/0 SUTUPAK TIES 24IN SA74H

## (undated) DEVICE — SUT PDS 1 XLH LP 99IN Z881G

## (undated) DEVICE — CATH TEMPO 5F BER II 65CM: Brand: TEMPO

## (undated) DEVICE — DIL W/ HC 5.0FR 20CM

## (undated) DEVICE — GW AMPLTZ SUPERSTIFF STR .035IN 180CM

## (undated) DEVICE — SOL NS 500ML

## (undated) DEVICE — NC TREK CORONARY DILATATION CATHETER 2.75 MM X 20 MM / RAPID-EXCHANGE: Brand: NC TREK

## (undated) DEVICE — CATH SZ ACCUVU SEG/20CM PIG .038IN 5F 70CM

## (undated) DEVICE — SUT SILK 2/0 SH CR5 18IN C0125

## (undated) DEVICE — CATH IV INSYTE AUTOGARD 14G 1 1/2IN ORNG

## (undated) DEVICE — PK AAA 40